# Patient Record
Sex: FEMALE | Race: WHITE | NOT HISPANIC OR LATINO | Employment: OTHER | URBAN - NONMETROPOLITAN AREA
[De-identification: names, ages, dates, MRNs, and addresses within clinical notes are randomized per-mention and may not be internally consistent; named-entity substitution may affect disease eponyms.]

---

## 2017-04-19 ENCOUNTER — OFFICE VISIT (OUTPATIENT)
Dept: CHIROPRACTIC MEDICINE | Facility: OTHER | Age: 62
End: 2017-04-19
Attending: CHIROPRACTOR
Payer: COMMERCIAL

## 2017-04-19 DIAGNOSIS — M99.02 SEGMENTAL AND SOMATIC DYSFUNCTION OF THORACIC REGION: ICD-10-CM

## 2017-04-19 DIAGNOSIS — M54.50 ACUTE BILATERAL LOW BACK PAIN WITHOUT SCIATICA: ICD-10-CM

## 2017-04-19 DIAGNOSIS — M99.03 SEGMENTAL AND SOMATIC DYSFUNCTION OF LUMBAR REGION: Primary | ICD-10-CM

## 2017-04-19 PROCEDURE — 98940 CHIROPRACT MANJ 1-2 REGIONS: CPT | Performed by: CHIROPRACTOR

## 2017-04-19 NOTE — MR AVS SNAPSHOT
"              After Visit Summary   4/19/2017    Katelyn Manley    MRN: [id number]           Patient Information     Date Of Birth          1955        Visit Information        Provider Department      4/19/2017 10:30 AM [facility]        Today's Diagnoses     Segmental and somatic dysfunction of lumbar region    -  1    Acute bilateral low back pain without sciatica        Segmental and somatic dysfunction of thoracic region           Follow-ups after your visit        Your next 10 appointments already scheduled     Sep 26, 2017 10:00 AM CDT   (Arrive by 9:45 AM)   PHYSICAL with Rosio Matamoros MD   [facility] ([facility] [place] [facility])    [address]  [place] [address]   [phone]              Who to contact     If you have questions or need follow up information about today's clinic visit or your schedule please contact  [facility] MICHELA TATUM directly at [phone].  Normal or non-critical lab and imaging results will be communicated to you by MyChart, letter or phone within 4 business days after the clinic has received the results. If you do not hear from us within 7 days, please contact the clinic through MyChart or phone. If you have a critical or abnormal lab result, we will notify you by phone as soon as possible.  Submit refill requests through [employer] or call your pharmacy and they will forward the refill request to us. Please allow 3 business days for your refill to be completed.          Additional Information About Your Visit        MyChart Information     [employer] lets you send messages to your doctor, view your test results, renew your prescriptions, schedule appointments and more. To sign up, go to [email or web address] . Click on \"Log in\" on the left side of the screen, which will take you to the Welcome page. Then click on \"Sign up Now\" on the right side of the page.     You will be asked to enter the access code listed below, as well as " some personal information. Please follow the directions to create your username and password.     Your access code is: [email or web address]  Expires: 2017 11:44 AM     Your access code will  in 90 days. If you need help or a new code, please call your [place] clinic or [phone].        Care EveryWhere ID     This is your Care EveryWhere ID. This could be used by other organizations to access your [place] medical records  [id number]         Blood Pressure from Last 3 Encounters:   16 126/74   07/15/16 118/64   16 126/90    Weight from Last 3 Encounters:   16 162 lb (73.5 kg)   07/15/16 160 lb (72.6 kg)   16 168 lb (76.2 kg)              We Performed the Following     CHIROPRAC MANIP,SPINAL,1-2 REGIONS        Primary Care Provider Office Phone # Fax #    Robert Blakely [phone]       [facility] [address] [facility] [address]        Thank you!     [facility]. Our goal is always to provide you with excellent care. Hearing back from our patients is one way we can continue to improve our services. Please take a few minutes to complete the written survey that you may receive in the mail after your visit with us. Thank you!             Your Updated Medication List - Protect others around you: Learn how to safely use, store and throw away your medicines at www.disposemymeds.org.          This list is accurate as of: 17 11:44 AM.  Always use your most recent med list.                   Brand Name Dispense Instructions for use    COLACE PO      Take 100 mg by mouth daily       GLUCOSAMINE SULFATE PO      Take 2,000 mg by mouth daily       METAMUCIL PO          MULTIVITAMIN PO      Take 1 capsule by mouth daily.       nystatin-triamcinolone cream    MYCOLOG II     Apply topically daily

## 2017-04-19 NOTE — PROGRESS NOTES
Subjective Finding:    Chief compalint: Patient presents with:  Back Pain: left leg and foot pain  , Pain Scale: 5/10, Intensity: sharp, Duration: 1 weeks, Change since last visit: , Radiating: no.    Date of injury:     Activities that the pain restricts:   Home/household activities: no.  Work duties: no.  Hobbies/social: no.  Sleep: no.  Makes symptoms better: rest.  Makes symptoms worse: activity, cervical extension and cervical flexion.  Have you seen anyone else for the symptoms? No.  Work related: no.  Automobile related injury: no.    Objective and Assessment:    Posture Analysis:   High shoulder: right.  Head tilt: .  High iliac crest: .  Head carriage: forward.  Thoracic Kyphosis: neutral.  Lumbar Lordosis: neutral.    Lumbar Range of Motion: flexion decreased.  Cervical Range of Motion: flexion decreased.  Thoracic Range of Motion: .  Extremity Range of Motion: .    Palpation:   Lumbar paraspine tightness with restricted ROM      Segmental dysfunction pre-treatment: T5  L4-5  Right SI.      Assessment post-treatment:  Cervical: ROM increased and pain and tenderness decreased.  Thoracic: ROM increased.  Lumbar: ROM increased.    Comments: .      Complicating Factors: .    Plan / Procedure:    Expected release date: .  Treatment plan: PRN.  Instructed patient: ice 20 minutes every other hour as needed.  Short term goals: reduce pain.  Long term goals: restore normal function.  Prognosis: excellent.

## 2017-05-09 ENCOUNTER — OFFICE VISIT (OUTPATIENT)
Dept: CHIROPRACTIC MEDICINE | Facility: OTHER | Age: 62
End: 2017-05-09
Attending: CHIROPRACTOR
Payer: COMMERCIAL

## 2017-05-09 ENCOUNTER — TRANSFERRED RECORDS (OUTPATIENT)
Dept: HEALTH INFORMATION MANAGEMENT | Facility: HOSPITAL | Age: 62
End: 2017-05-09

## 2017-05-09 DIAGNOSIS — M99.02 SEGMENTAL AND SOMATIC DYSFUNCTION OF THORACIC REGION: ICD-10-CM

## 2017-05-09 DIAGNOSIS — M54.50 ACUTE BILATERAL LOW BACK PAIN WITHOUT SCIATICA: ICD-10-CM

## 2017-05-09 DIAGNOSIS — M99.01 SEGMENTAL AND SOMATIC DYSFUNCTION OF CERVICAL REGION: ICD-10-CM

## 2017-05-09 DIAGNOSIS — M99.03 SEGMENTAL AND SOMATIC DYSFUNCTION OF LUMBAR REGION: Primary | ICD-10-CM

## 2017-05-09 PROCEDURE — 98941 CHIROPRACT MANJ 3-4 REGIONS: CPT | Performed by: CHIROPRACTOR

## 2017-05-09 NOTE — MR AVS SNAPSHOT
"              After Visit Summary   5/9/2017    Katelyn Manley    MRN: [id number]           Patient Information     Date Of Birth          1955        Visit Information        Provider Department      5/9/2017 10:40 AM [facility]        Today's Diagnoses     Segmental and somatic dysfunction of lumbar region    -  1    Acute bilateral low back pain without sciatica        Segmental and somatic dysfunction of thoracic region        Segmental and somatic dysfunction of cervical region           Follow-ups after your visit        Your next 10 appointments already scheduled     Sep 26, 2017 10:00 AM CDT   (Arrive by 9:45 AM)   PHYSICAL with Rosio Matamoros MD   [facility] ([facility])    [address]  [facility] [address]   [phone]              Who to contact     If you have questions or need follow up information about today's clinic visit or your schedule please contact  [facility] MICHELA TATUM directly at [phone].  Normal or non-critical lab and imaging results will be communicated to you by MyChart, letter or phone within 4 business days after the clinic has received the results. If you do not hear from us within 7 days, please contact the clinic through MyChart or phone. If you have a critical or abnormal lab result, we will notify you by phone as soon as possible.  Submit refill requests through [employer] or call your pharmacy and they will forward the refill request to us. Please allow 3 business days for your refill to be completed.          Additional Information About Your Visit        [employer] Information     [employer] lets you send messages to your doctor, view your test results, renew your prescriptions, schedule appointments and more. To sign up, go to [email or web address] . Click on \"Log in\" on the left side of the screen, which will take you to the Welcome page. Then click on \"Sign up Now\" on the right side of the page.     You will be " asked to enter the access code listed below, as well as some personal information. Please follow the directions to create your username and password.     Your access code is: [email or web address]  Expires: 2017 11:44 AM     Your access code will  in 90 days. If you need help or a new code, please call your [place] clinic or [phone].        Care EveryWhere ID     This is your Care EveryWhere ID. This could be used by other organizations to access your [place] medical records  [id number]         Blood Pressure from Last 3 Encounters:   16 126/74   07/15/16 118/64   16 126/90    Weight from Last 3 Encounters:   16 162 lb (73.5 kg)   07/15/16 160 lb (72.6 kg)   16 168 lb (76.2 kg)              We Performed the Following     CHIROPRAC MANIP,SPINAL,3-4 REGIONS        Primary Care Provider Office Phone # Fax #    Robert Blakely [phone]       [facility] [address] [facility] [address]        Thank you!     Thank you for choosing  [facility]  for your care. Our goal is always to provide you with excellent care. Hearing back from our patients is one way we can continue to improve our services. Please take a few minutes to complete the written survey that you may receive in the mail after your visit with us. Thank you!             Your Updated Medication List - Protect others around you: Learn how to safely use, store and throw away your medicines at www.disposemymeds.org.          This list is accurate as of: 17 11:59 PM.  Always use your most recent med list.                   Brand Name Dispense Instructions for use    COLACE PO      Take 100 mg by mouth daily       GLUCOSAMINE SULFATE PO      Take 2,000 mg by mouth daily       METAMUCIL PO          MULTIVITAMIN PO      Take 1 capsule by mouth daily.       nystatin-triamcinolone cream    MYCOLOG II     Apply topically daily

## 2017-07-20 ENCOUNTER — OFFICE VISIT (OUTPATIENT)
Dept: CHIROPRACTIC MEDICINE | Facility: OTHER | Age: 62
End: 2017-07-20
Attending: CHIROPRACTOR
Payer: COMMERCIAL

## 2017-07-20 DIAGNOSIS — M54.2 CERVICALGIA: ICD-10-CM

## 2017-07-20 DIAGNOSIS — M99.02 SEGMENTAL AND SOMATIC DYSFUNCTION OF THORACIC REGION: ICD-10-CM

## 2017-07-20 DIAGNOSIS — M99.01 SEGMENTAL AND SOMATIC DYSFUNCTION OF CERVICAL REGION: Primary | ICD-10-CM

## 2017-07-20 PROCEDURE — 98940 CHIROPRACT MANJ 1-2 REGIONS: CPT | Performed by: CHIROPRACTOR

## 2017-07-20 NOTE — MR AVS SNAPSHOT
"              After Visit Summary   7/20/2017    Katelyn Manley    MRN: [id number]           Patient Information     Date Of Birth          1955        Visit Information        Provider Department      7/20/2017 10:00 AM [facility]        Today's Diagnoses     Segmental and somatic dysfunction of cervical region    -  1    Cervicalgia        Segmental and somatic dysfunction of thoracic region           Follow-ups after your visit        Your next 10 appointments already scheduled     Sep 26, 2017 10:00 AM CDT   (Arrive by 9:45 AM)   PHYSICAL with Rosio Matamoros MD   [facility] [place] ([facility] )    [address]  [place] [address]   [phone]              Who to contact     If you have questions or need follow up information about today's clinic visit or your schedule please contact  [facility] SHANNA TATUM directly at [phone].  Normal or non-critical lab and imaging results will be communicated to you by MyChart, letter or phone within 4 business days after the clinic has received the results. If you do not hear from us within 7 days, please contact the clinic through MyChart or phone. If you have a critical or abnormal lab result, we will notify you by phone as soon as possible.  Submit refill requests through [employer] or call your pharmacy and they will forward the refill request to us. Please allow 3 business days for your refill to be completed.          Additional Information About Your Visit        MyChart Information     [employer] lets you send messages to your doctor, view your test results, renew your prescriptions, schedule appointments and more. To sign up, go to [email or web address] . Click on \"Log in\" on the left side of the screen, which will take you to the Welcome page. Then click on \"Sign up Now\" on the right side of the page.     You will be asked to enter the access code listed below, as well as some personal " information. Please follow the directions to create your username and password.     Your access code is: [email or web address]  Expires: 10/22/2017 12:57 PM     Your access code will  in 90 days. If you need help or a new code, please call your [place] clinic or [phone].        Care EveryWhere ID     This is your Care EveryWhere ID. This could be used by other organizations to access your [place] medical records  [id number]         Blood Pressure from Last 3 Encounters:   16 126/74   07/15/16 118/64   16 126/90    Weight from Last 3 Encounters:   16 162 lb (73.5 kg)   07/15/16 160 lb (72.6 kg)   16 168 lb (76.2 kg)              We Performed the Following     CHIROPRAC MANIP,SPINAL,1-2 REGIONS        Primary Care Provider Office Phone # Fax #    Robert Blakely [phone]       [facility] [address] [address]        Equal Access to Services     [facility]: Hadii aad ku hadasho Soomaali, waaxda luqadaha, qaybta kaalmada adeegyada, sascha lindsay . So [facility] [phone].    ATENCIÓN: Si habla español, tiene a bennett disposición servicios gratuitos de asistencia lingüística. Llame al [phone].    We comply with applicable federal civil rights laws and [place] laws. We do not discriminate on the basis of race, color, national origin, age, disability sex, sexual orientation or gender identity.            Thank you!     Thank you for choosing  [facility]  for your care. Our goal is always to provide you with excellent care. Hearing back from our patients is one way we can continue to improve our services. Please take a few minutes to complete the written survey that you may receive in the mail after your visit with us. Thank you!             Your Updated Medication List - Protect others around you: Learn how to safely use, store and throw away your medicines at www.disposemymeds.org.          This list is  accurate as of: 7/20/17 11:59 PM.  Always use your most recent med list.                   Brand Name Dispense Instructions for use Diagnosis    COLACE PO      Take 100 mg by mouth daily        GLUCOSAMINE SULFATE PO      Take 2,000 mg by mouth daily        METAMUCIL PO           MULTIVITAMIN PO      Take 1 capsule by mouth daily.        nystatin-triamcinolone cream    MYCOLOG II     Apply topically daily

## 2017-07-24 NOTE — PROGRESS NOTES
Subjective Finding:    Chief compalint: Patient presents with:  Neck Pain  , Pain Scale: 5/10, Intensity: sharp, Duration: 1 weeks, Change since last visit: , Radiating: no.    Date of injury:     Activities that the pain restricts:   Home/household activities: no.  Work duties: no.  Hobbies/social: no.  Sleep: no.  Makes symptoms better: rest.  Makes symptoms worse: activity, cervical extension and cervical flexion.  Have you seen anyone else for the symptoms? No.  Work related: no.  Automobile related injury: no.    Objective and Assessment:    Posture Analysis:   High shoulder: right.  Head tilt: .  High iliac crest: .  Head carriage: forward.  Thoracic Kyphosis: neutral.  Lumbar Lordosis: neutral.    Lumbar Range of Motion: flexion decreased.  Cervical Range of Motion: flexion decreased.  Thoracic Range of Motion: .  Extremity Range of Motion: .    Palpation:   C paraspine tightness with restricted ROM      Segmental dysfunction pre-treatment: T5   C7      Assessment post-treatment:  Cervical: ROM increased and pain and tenderness decreased.  Thoracic: ROM increased.  Lumbar: ROM increased.    Comments: .      Complicating Factors: .    Plan / Procedure:    Expected release date: .  Treatment plan: PRN.  Instructed patient: ice 20 minutes every other hour as needed.  Short term goals: reduce pain.  Long term goals: restore normal function.  Prognosis: excellent.

## 2017-08-04 DIAGNOSIS — Z12.31 VISIT FOR SCREENING MAMMOGRAM: Primary | ICD-10-CM

## 2017-09-12 DIAGNOSIS — Z12.31 VISIT FOR SCREENING MAMMOGRAM: Primary | ICD-10-CM

## 2017-09-12 PROCEDURE — G0202 SCR MAMMO BI INCL CAD: HCPCS | Mod: TC | Performed by: RADIOLOGY

## 2017-09-12 PROCEDURE — 77063 BREAST TOMOSYNTHESIS BI: CPT | Mod: TC | Performed by: RADIOLOGY

## 2017-09-18 ENCOUNTER — OFFICE VISIT (OUTPATIENT)
Dept: FAMILY MEDICINE | Facility: OTHER | Age: 62
End: 2017-09-18
Attending: NURSE PRACTITIONER
Payer: COMMERCIAL

## 2017-09-18 VITALS
OXYGEN SATURATION: 98 % | SYSTOLIC BLOOD PRESSURE: 120 MMHG | BODY MASS INDEX: 28.35 KG/M2 | TEMPERATURE: 97.6 F | WEIGHT: 160 LBS | HEIGHT: 63 IN | DIASTOLIC BLOOD PRESSURE: 80 MMHG | HEART RATE: 76 BPM | RESPIRATION RATE: 16 BRPM

## 2017-09-18 DIAGNOSIS — R30.0 DYSURIA: Primary | ICD-10-CM

## 2017-09-18 LAB
ALBUMIN UR-MCNC: NEGATIVE MG/DL
APPEARANCE UR: ABNORMAL
BACTERIA #/AREA URNS HPF: ABNORMAL /HPF
BILIRUB UR QL STRIP: NEGATIVE
COLOR UR AUTO: ABNORMAL
GLUCOSE UR STRIP-MCNC: NEGATIVE MG/DL
HGB UR QL STRIP: ABNORMAL
KETONES UR STRIP-MCNC: NEGATIVE MG/DL
LEUKOCYTE ESTERASE UR QL STRIP: ABNORMAL
MUCOUS THREADS #/AREA URNS LPF: PRESENT /LPF
NITRATE UR QL: NEGATIVE
PH UR STRIP: 5 PH (ref 4.7–8)
RBC #/AREA URNS AUTO: 11 /HPF (ref 0–2)
SOURCE: ABNORMAL
SP GR UR STRIP: 1.01 (ref 1–1.03)
UROBILINOGEN UR STRIP-MCNC: NORMAL MG/DL (ref 0–2)
WBC #/AREA URNS AUTO: >182 /HPF (ref 0–2)
WBC CLUMPS #/AREA URNS HPF: PRESENT /HPF

## 2017-09-18 PROCEDURE — 87186 SC STD MICRODIL/AGAR DIL: CPT | Performed by: NURSE PRACTITIONER

## 2017-09-18 PROCEDURE — 81001 URINALYSIS AUTO W/SCOPE: CPT | Performed by: NURSE PRACTITIONER

## 2017-09-18 PROCEDURE — 87086 URINE CULTURE/COLONY COUNT: CPT | Performed by: NURSE PRACTITIONER

## 2017-09-18 PROCEDURE — 87088 URINE BACTERIA CULTURE: CPT | Performed by: NURSE PRACTITIONER

## 2017-09-18 PROCEDURE — 99213 OFFICE O/P EST LOW 20 MIN: CPT | Performed by: NURSE PRACTITIONER

## 2017-09-18 RX ORDER — CIPROFLOXACIN 500 MG/1
500 TABLET, FILM COATED ORAL 2 TIMES DAILY
Qty: 14 TABLET | Refills: 0 | Status: SHIPPED | OUTPATIENT
Start: 2017-09-18 | End: 2017-09-26

## 2017-09-18 ASSESSMENT — ANXIETY QUESTIONNAIRES
4. TROUBLE RELAXING: NOT AT ALL
6. BECOMING EASILY ANNOYED OR IRRITABLE: NOT AT ALL
5. BEING SO RESTLESS THAT IT IS HARD TO SIT STILL: NOT AT ALL
3. WORRYING TOO MUCH ABOUT DIFFERENT THINGS: NOT AT ALL
IF YOU CHECKED OFF ANY PROBLEMS ON THIS QUESTIONNAIRE, HOW DIFFICULT HAVE THESE PROBLEMS MADE IT FOR YOU TO DO YOUR WORK, TAKE CARE OF THINGS AT HOME, OR GET ALONG WITH OTHER PEOPLE: NOT DIFFICULT AT ALL
1. FEELING NERVOUS, ANXIOUS, OR ON EDGE: NOT AT ALL
2. NOT BEING ABLE TO STOP OR CONTROL WORRYING: NOT AT ALL
7. FEELING AFRAID AS IF SOMETHING AWFUL MIGHT HAPPEN: NOT AT ALL
GAD7 TOTAL SCORE: 0

## 2017-09-18 ASSESSMENT — PAIN SCALES - GENERAL: PAINLEVEL: NO PAIN (1)

## 2017-09-18 ASSESSMENT — PATIENT HEALTH QUESTIONNAIRE - PHQ9: SUM OF ALL RESPONSES TO PHQ QUESTIONS 1-9: 0

## 2017-09-18 NOTE — MR AVS SNAPSHOT
"              After Visit Summary   9/18/2017    Katelyn Manley    MRN: [id number]           Patient Information     Date Of Birth          1955        Visit Information        Provider Department      9/18/2017 8:20 AM Katelyn Sarabia APRN [facility]        Today's Diagnoses     Dysuria    -  1      Care Instructions      Dysuria     Painful urination (dysuria) is often caused by a problem in the urinary tract.   Dysuria is pain felt during urination. It is often described as a burning. Learn more about this problem and how it can be treated.  What causes dysuria?  Possible causes include:    Infection with a bacteria or virus such as a urinary tract infection (UTI or a sexually transmitted infection (STI)    Sensitivity or allergy to chemicals such as those found in lotions and other products    Prostate or bladder problems    Radiation therapy to the pelvic area  How is dysuria diagnosed?  Your healthcare provider will examine you. He or she will ask about your symptoms and health. After talking with you and doing a physical exam, your healthcare provider may know what is causing your dysuria. He or she will usually request  a sample of your urine. Tests of your urine, or a \"urinalysis,\" are done. A urinalysis may include:    Looking at the urine sample (visual exam)    Checking for substances (chemical exam)    Looking at a small amount under a microscope (microscopic exam)  Some parts of the urinalysis may be done in the provider's office and some in a lab. And, the urine sample may be checked for bacteria and yeast (urine culture). Your healthcare provider will tell you more about these tests if they are needed.  How is dysuria treated?  Treatment depends on the cause. If you have a bacterial infection, you may need antibiotics. You may be given medicines to make it easier for you to urinate and help relieve pain. Your healthcare provider can tell you more about your treatment " options. Untreated, symptoms may get worse.  When to call your healthcare provider  Call the healthcare provider right away if you have any of the following:    Fever of 100.4 F (38 C) or higher     No improvement after three days of treatment    Trouble urinating because of pain    New or increased discharge from the vagina or penis    Rash or joint pain    Increased back or abdominal pain    Enlarged painful lymph nodes (lumps) in the groin   Date Last Reviewed: 1/1/2017 2000-2017 [employer]. [address] [place] [address]. All rights reserved. This information is not intended as a substitute for professional medical care. Always follow your healthcare professional's instructions.                Follow-ups after your visit        Follow-up notes from your care team     Return if symptoms worsen or fail to improve.      Your next 10 appointments already scheduled     Sep 26, 2017 10:00 AM CDT   (Arrive by 9:45 AM)   PHYSICAL with Rosio Matamoros MD   [facility] ([facility] )    [address] [place] [address]   [phone]              Who to contact     If you have questions or need follow up information about today's clinic visit or your schedule please contact [facility] directly at [phone].  Normal or non-critical lab and imaging results will be communicated to you by MyChart, letter or phone within 4 business days after the clinic has received the results. If you do not hear from us within 7 days, please contact the clinic through MyChart or phone. If you have a critical or abnormal lab result, we will notify you by phone as soon as possible.  Submit refill requests through [employer] or call your pharmacy and they will forward the refill request to us. Please allow 3 business days for your refill to be completed.          Additional Information About Your Visit        [employer] Information     [employer] lets you send messages  "to your doctor, view your test results, renew your prescriptions, schedule appointments and more. To sign up, go to [email or web address] . Click on \"Log in\" on the left side of the screen, which will take you to the Welcome page. Then click on \"Sign up Now\" on the right side of the page.     You will be asked to enter the access code listed below, as well as some personal information. Please follow the directions to create your username and password.     Your access code is: [email or web address]  Expires: 10/22/2017 12:57 PM     Your access code will  in 90 days. If you need help or a new code, please call your [place] clinic or [phone].        Care EveryWhere ID     This is your Care EveryWhere ID. This could be used by other organizations to access your [place] medical records  [id number]        Your Vitals Were     Pulse Temperature Respirations Height Pulse Oximetry BMI (Body Mass Index)    76 97.6  F (36.4  C) (Tympanic) 16 5' 3.25\" (1.607 m) 98% 28.12 kg/m2       Blood Pressure from Last 3 Encounters:   17 120/80   16 126/74   07/15/16 118/64    Weight from Last 3 Encounters:   17 160 lb (72.6 kg)   16 162 lb (73.5 kg)   07/15/16 160 lb (72.6 kg)              We Performed the Following     UA with Microscopic reflex to Culture - MICHELA          Today's Medication Changes          These changes are accurate as of: 17  9:15 AM.  If you have any questions, ask your nurse or doctor.               Start taking these medicines.        Dose/Directions    ciprofloxacin 500 MG tablet   Commonly known as:  CIPRO   Used for:  Dysuria   Started by:  Katelyn Sarabia APRN CNP        Dose:  500 mg   Take 1 tablet (500 mg) by mouth 2 times daily   Quantity:  14 tablet   Refills:  0            Where to get your medicines      These medications were sent to [facility] - [address]  [address] [place] [address]     Phone:  [phone]     " ciprofloxacin 500 MG tablet                Primary Care Provider Office Phone # Fax #    Robert Blakely [phone]       [facility] [address] [facility] [facility] [address]        Equal Access to Services     [employer] : Hadzac medina hadcorio Soboubacarali, waaxda luqadaha, qaybta kaalmada adeegyada, sascha greggn batool fierro angélica bond. So [facility] [phone].    ATENCIÓN: Si habla español, tiene a bennett disposición servicios gratuitos de asistencia lingüística. Llame al [phone].    We comply with applicable federal civil rights laws and [place] laws. We do not discriminate on the basis of race, color, national origin, age, disability sex, sexual orientation or gender identity.            Thank you!     Thank you for choosing [facility]  for your care. Our goal is always to provide you with excellent care. Hearing back from our patients is one way we can continue to improve our services. Please take a few minutes to complete the written survey that you may receive in the mail after your visit with us. Thank you!             Your Updated Medication List - Protect others around you: Learn how to safely use, store and throw away your medicines at www.disposemymeds.org.          This list is accurate as of: 9/18/17  9:15 AM.  Always use your most recent med list.                   Brand Name Dispense Instructions for use Diagnosis    ciprofloxacin 500 MG tablet    CIPRO    14 tablet    Take 1 tablet (500 mg) by mouth 2 times daily    Dysuria       COLACE PO      Take 100 mg by mouth daily        GLUCOSAMINE SULFATE PO      Take 2,000 mg by mouth daily        METAMUCIL PO           MULTIVITAMIN PO      Take 1 capsule by mouth daily.        nystatin-triamcinolone cream    MYCOLOG II     Apply topically daily

## 2017-09-18 NOTE — PROGRESS NOTES
SUBJECTIVE:   Katelyn Manley is a 62 year old female who presents to clinic today for the following health issues:      URINARY TRACT SYMPTOMS      Duration: one week    Description  frequency    Intensity:  Mild    Accompanying signs and symptoms:  Fever/chills: no   Flank pain no   Nausea and vomiting: no   Vaginal symptoms: none and itching  Abdominal/Pelvic Pain: no     History  History of frequent UTI's: YES- over one year ago  History of kidney stones: no   Sexually Active: YES  Possibility of pregnancy: No    Precipitating or alleviating factors: None    Therapies tried and outcome: none   Outcome: none            Problem list and histories reviewed & adjusted, as indicated.  Additional history: as documented    Patient Active Problem List   Diagnosis     Pre-diabetes     Leukoplakia     Lichen sclerosus     Dyslipidemia     Status post vaginal hysterectomy     Past Surgical History:   Procedure Laterality Date     COLONOSCOPY  2004     COLONOSCOPY  2-    Dr Sanchez/ sigmoid diverticular dz     COLPORRHAPHY POSTERIOR N/A 2/18/2015    Procedure: COLPORRHAPHY POSTERIOR;  Surgeon: Vasquez Sauer MD;  [facility]     CYSTOSCOPY N/A 2/18/2015    Procedure: CYSTOSCOPY;  Surgeon: Vasquez Sauer MD;  [facility]     DILATION AND CURETTAGE, HYSTEROSCOPY DIAGNOSTIC, COMBINED  5/7/2014    Procedure: COMBINED DILATION AND CURETTAGE, HYSTEROSCOPY DIAGNOSTIC;  Surgeon: Vasquez Sauer MD;  [facility]     EYE SURGERY Left 08/2016    macular hole repair     HYSTERECTOMY VAGINAL, BILATERAL SALPINGO-OOPHERECTOMY, COMBINED N/A 2/18/2015    Procedure: COMBINED HYSTERECTOMY VAGINAL, SALPINGO-OOPHORECTOMY;  Surgeon: Vasquez Sauer MD;  [facility]     JOINT REPLACEMENT  unijoint    right     JOINT REPLACEMENT  full joint    x2 - left     SLING TRANSOBTURATOR N/A 2/18/2015    Procedure: SLING TRANSOBTURATOR;  Surgeon: Vasquez Sauer MD;  [facility]       Social History   Substance Use Topics     Smoking  "status: Never Smoker     Smokeless tobacco: Never Used     Alcohol use No     Family History   Problem Relation Age of Onset     CEREBROVASCULAR DISEASE Father 77     CVA - cause of death     Other - See Comments Mother      [place] Granulometosis - cause of death     Other - See Comments Brother      multiple sclerosis         Current Outpatient Prescriptions   Medication Sig Dispense Refill     nystatin-triamcinolone (MYCOLOG II) cream Apply topically daily       GLUCOSAMINE SULFATE PO Take 2,000 mg by mouth daily       Docusate Sodium (COLACE PO) Take 100 mg by mouth daily       Psyllium (METAMUCIL PO)        Multiple Vitamins-Minerals (MULTIVITAMIN OR) Take 1 capsule by mouth daily.       Allergies   Allergen Reactions     Cats      Cat/feline product derivatives     Dogs      Horse-Derived Products      And cows         Reviewed and updated as needed this visit by clinical staffTobacco  Allergies  Meds  Med Hx  Surg Hx  Fam Hx  Soc Hx      Reviewed and updated as needed this visit by Provider         ROS:  C: NEGATIVE for fever, chills, change in weight  R: NEGATIVE for significant cough or SOB  CV: NEGATIVE for chest pain, palpitations or peripheral edema  GI: NEGATIVE for nausea, abdominal pain, heartburn, or change in bowel habits  : dysuria, frequency  and urgency    OBJECTIVE:     /80 (BP Location: Right arm, Patient Position: Sitting, Cuff Size: Adult Regular)  Pulse 76  Temp 97.6  F (36.4  C) (Tympanic)  Resp 16  Ht 5' 3.25\" (1.607 m)  Wt 160 lb (72.6 kg)  SpO2 98%  BMI 28.12 kg/m2  Body mass index is 28.12 kg/(m^2).   GENERAL: healthy, alert and no distress  RESP: lungs clear to auscultation - no rales, rhonchi or wheezes  CV: regular rate and rhythm, normal S1 S2, no S3 or S4, no murmur, click or rub, no peripheral edema and peripheral pulses strong  ABDOMEN: soft, nontender, no hepatosplenomegaly, no masses and bowel sounds normal  PSYCH: mentation appears normal, affect " normal/bright    Diagnostic Test Results:  Results for orders placed or performed in visit on 09/18/17 (from the past 24 hour(s))   UA with Microscopic reflex to Culture - [place]   Result Value Ref Range    Color Urine Light Yellow     Appearance Urine Slightly Cloudy     Glucose Urine Negative NEG^Negative mg/dL    Bilirubin Urine Negative NEG^Negative    Ketones Urine Negative NEG^Negative mg/dL    Specific Gravity Urine 1.006 1.003 - 1.035    Blood Urine Moderate (A) NEG^Negative    pH Urine 5.0 4.7 - 8.0 pH    Protein Albumin Urine Negative NEG^Negative mg/dL    Urobilinogen mg/dL Normal 0.0 - 2.0 mg/dL    Nitrite Urine Negative NEG^Negative    Leukocyte Esterase Urine Large (A) NEG^Negative    Source Midstream Urine     WBC Urine >182 (H) 0 - 2 /HPF    RBC Urine 11 (H) 0 - 2 /HPF    WBC Clumps Present (A) NEG^Negative /HPF    Bacteria Urine Moderate (A) NEG^Negative /HPF    Mucous Urine Present (A) NEG^Negative /LPF       ASSESSMENT:       PLAN:   ASSESSMENT / PLAN:  (R30.0) Dysuria  (primary encounter diagnosis)  Comment: treating for cystitis with cipro. UC ordered. Encouraged hydration. Katelyn should follow up if no improvement or worsening symptoms  Plan:  UA with Microscopic reflex to Culture - [place],    Urine Culture Aerobic Bacterial,   ciprofloxacin (CIPRO) 500 MG tablet       Keep appointment with OB/GYN          See Patient Instructions    [facility] [place]

## 2017-09-18 NOTE — NURSING NOTE
"Chief Complaint   Patient presents with     UTI       Initial /80 (BP Location: Right arm, Patient Position: Sitting, Cuff Size: Adult Regular)  Pulse 76  Temp 97.6  F (36.4  C) (Tympanic)  Resp 16  Ht 5' 3.25\" (1.607 m)  Wt 160 lb (72.6 kg)  SpO2 98%  BMI 28.12 kg/m2 Estimated body mass index is 28.12 kg/(m^2) as calculated from the following:    Height as of this encounter: 5' 3.25\" (1.607 m).    Weight as of this encounter: 160 lb (72.6 kg).  Medication Reconciliation: complete   Brittany Caceres      "

## 2017-09-18 NOTE — PATIENT INSTRUCTIONS

## 2017-09-19 ASSESSMENT — ANXIETY QUESTIONNAIRES: GAD7 TOTAL SCORE: 0

## 2017-09-20 DIAGNOSIS — N30.01 ACUTE CYSTITIS WITH HEMATURIA: Primary | ICD-10-CM

## 2017-09-20 LAB
BACTERIA SPEC CULT: ABNORMAL
SPECIMEN SOURCE: ABNORMAL

## 2017-09-20 RX ORDER — SULFAMETHOXAZOLE/TRIMETHOPRIM 800-160 MG
1 TABLET ORAL 2 TIMES DAILY
Qty: 14 TABLET | Refills: 0 | Status: SHIPPED | OUTPATIENT
Start: 2017-09-20 | End: 2017-10-06

## 2017-09-20 NOTE — PROGRESS NOTES
Results for orders placed or performed in visit on 09/18/17   UA with Microscopic reflex to Culture - [place]   Result Value Ref Range    Color Urine Light Yellow     Appearance Urine Slightly Cloudy     Glucose Urine Negative NEG^Negative mg/dL    Bilirubin Urine Negative NEG^Negative    Ketones Urine Negative NEG^Negative mg/dL    Specific Gravity Urine 1.006 1.003 - 1.035    Blood Urine Moderate (A) NEG^Negative    pH Urine 5.0 4.7 - 8.0 pH    Protein Albumin Urine Negative NEG^Negative mg/dL    Urobilinogen mg/dL Normal 0.0 - 2.0 mg/dL    Nitrite Urine Negative NEG^Negative    Leukocyte Esterase Urine Large (A) NEG^Negative    Source Midstream Urine     WBC Urine >182 (H) 0 - 2 /HPF    RBC Urine 11 (H) 0 - 2 /HPF    WBC Clumps Present (A) NEG^Negative /HPF    Bacteria Urine Moderate (A) NEG^Negative /HPF    Mucous Urine Present (A) NEG^Negative /LPF   Urine Culture Aerobic Bacterial   Result Value Ref Range    Specimen Description Midstream Urine     Culture Micro >100,000 colonies/mL  Escherichia coli   (A)        Susceptibility    Escherichia coli - JUDY     Amoxicillin/Clav 4 Sensitive ug/mL     AMPICILLIN 4 Sensitive ug/mL     CEFAZOLIN* <=4 Sensitive ug/mL      * Cefazolin JUDY breakpoints are for the treatment of uncomplicated urinary tract infections.  For the treatment of systemic infections, please contact the laboratory for additional testing.     CEFTAZIDIME <=1 Sensitive ug/mL     CEFTRIAXONE <=1 Sensitive ug/mL     CIPROFLOXACIN >=4 Resistant ug/mL     GENTAMICIN <=1 Sensitive ug/mL     LEVOFLOXACIN >=8 Resistant ug/mL     NITROFURANTOIN <=16 Sensitive ug/mL     TOBRAMYCIN <=1 Sensitive ug/mL     Trimethoprim/Sulfa <=1/19 Sensitive ug/mL     AMPICILLIN/SULBACTAM <=2 Sensitive ug/mL     Piperacillin/Tazo <=4 Sensitive ug/mL     CEFEPIME <=1 Sensitive ug/mL     Katelyn was initially placed on Cipro. Will have her stop the Cipro and start bactrim.  Faxed order to [facility]

## 2017-09-21 ENCOUNTER — TRANSFERRED RECORDS (OUTPATIENT)
Dept: HEALTH INFORMATION MANAGEMENT | Facility: HOSPITAL | Age: 62
End: 2017-09-21

## 2017-09-26 ENCOUNTER — OFFICE VISIT (OUTPATIENT)
Dept: OBGYN | Facility: OTHER | Age: 62
End: 2017-09-26
Attending: OBSTETRICS & GYNECOLOGY
Payer: COMMERCIAL

## 2017-09-26 VITALS
HEART RATE: 76 BPM | SYSTOLIC BLOOD PRESSURE: 112 MMHG | HEIGHT: 63 IN | WEIGHT: 156 LBS | DIASTOLIC BLOOD PRESSURE: 70 MMHG | BODY MASS INDEX: 27.64 KG/M2

## 2017-09-26 DIAGNOSIS — Z00.00 ROUTINE GENERAL MEDICAL EXAMINATION AT A HEALTH CARE FACILITY: Primary | ICD-10-CM

## 2017-09-26 LAB — HEMOCCULT SP1 STL QL: NEGATIVE

## 2017-09-26 PROCEDURE — 99396 PREV VISIT EST AGE 40-64: CPT | Performed by: OBSTETRICS & GYNECOLOGY

## 2017-09-26 PROCEDURE — 82274 ASSAY TEST FOR BLOOD FECAL: CPT | Performed by: OBSTETRICS & GYNECOLOGY

## 2017-09-26 ASSESSMENT — ANXIETY QUESTIONNAIRES
7. FEELING AFRAID AS IF SOMETHING AWFUL MIGHT HAPPEN: NOT AT ALL
2. NOT BEING ABLE TO STOP OR CONTROL WORRYING: NOT AT ALL
GAD7 TOTAL SCORE: 0
5. BEING SO RESTLESS THAT IT IS HARD TO SIT STILL: NOT AT ALL
6. BECOMING EASILY ANNOYED OR IRRITABLE: NOT AT ALL
1. FEELING NERVOUS, ANXIOUS, OR ON EDGE: NOT AT ALL
3. WORRYING TOO MUCH ABOUT DIFFERENT THINGS: NOT AT ALL

## 2017-09-26 ASSESSMENT — PATIENT HEALTH QUESTIONNAIRE - PHQ9
5. POOR APPETITE OR OVEREATING: NOT AT ALL
SUM OF ALL RESPONSES TO PHQ QUESTIONS 1-9: 0

## 2017-09-26 NOTE — NURSING NOTE
"Chief Complaint   Patient presents with     Gyn Exam       Initial /70  Pulse 76  Ht 5' 3.25\" (1.607 m)  Wt 156 lb (70.8 kg)  BMI 27.42 kg/m2 Estimated body mass index is 27.42 kg/(m^2) as calculated from the following:    Height as of this encounter: 5' 3.25\" (1.607 m).    Weight as of this encounter: 156 lb (70.8 kg).  Medication Reconciliation: complete   Ernestina Pedro      "

## 2017-09-26 NOTE — PATIENT INSTRUCTIONS
Fasting lab due on Friday.  iFob (screen for blood in stool) test and instructions given.  Return for annual exam.  Colonoscopy due in 2022.  Pneumonia vaccines due after age 65  Return for annual exam in 1 year

## 2017-09-26 NOTE — PROGRESS NOTES
ANNUAL    Katelyn is a 62 year old   who presents for annual exam.   Postmenopausal since 12 years.  She is having no symptoms. No vaginal bleeding noted.     Concerns other than routine annual and health maintenance:  Had uti.  GYNECOLOGIC HISTORY:    She is sexually active  Problems with sex: n  Safe: y   Wanting std testing? n  Estrogen replacement therapy:  n  History of abnormal Pap smear: cryo age 22  Family history of breast ca n, ovarian ca n, uterine n, colon CA:  n       HEALTH MAINTENANCE:  Cholesterol: (No components found for: CHOL2 ) History of abnormal lipids: elevated   Last Mammo: current . History of abnormal Mammo: n   Regular Self Breast Exams: y  Last Colonoscopy:  History of abnormal Colonoscopy n  Calcium or vitamins; y   Exercise: y     TSH: (No components found for: TSH1 )  Pap; (  Lab Results   Component Value Date    PAP NIL 2014    )    HISTORY:  Obstetric History       T0      L2     SAB0   TAB0   Ectopic0   Multiple0   Live Births2       # Outcome Date GA Lbr Reed/2nd Weight Sex Delivery Anes PTL Lv   3 SAB            2 Para     F Vag-Spont   TAMMY   1 Para     F Vag-Spont   TAMMY        Past Medical History:   Diagnosis Date     Cyst of Bartholin's gland 10/22/2007     Diverticulitis of sigmoid colon 2011     Endometrial hyperplasia without atypia, simple      Glucose intolerance (impaired glucose tolerance) 790.22     Hypercholesterolemia 2011     Menopause 2013     Osteoarthrosis, unspecified whether generalized or localized, lower leg 2002     S/P vaginal hysterectomy 2015     Special screening for malignant neoplasms, colon 10/15/2004     Status post total knee replacement 2011     Past Surgical History:   Procedure Laterality Date     COLONOSCOPY       COLONOSCOPY  2012    Dr Sanchez/ sigmoid diverticular dz     COLPORRHAPHY POSTERIOR N/A 2015    Procedure: COLPORRHAPHY POSTERIOR;  Surgeon: Vasquez Sauer MD;  [facility]     CYSTOSCOPY N/A 2/18/2015    Procedure: CYSTOSCOPY;  Surgeon: Vasquez Sauer MD;  [facility]     DILATION AND CURETTAGE, HYSTEROSCOPY DIAGNOSTIC, COMBINED  5/7/2014    Procedure: COMBINED DILATION AND CURETTAGE, HYSTEROSCOPY DIAGNOSTIC;  Surgeon: Vasquez Sauer MD;  [facility]     EYE SURGERY Left 08/2016    macular hole repair     HYSTERECTOMY VAGINAL, BILATERAL SALPINGO-OOPHERECTOMY, COMBINED N/A 2/18/2015    Procedure: COMBINED HYSTERECTOMY VAGINAL, SALPINGO-OOPHORECTOMY;  Surgeon: Vasquez Sauer MD;  [facility]     JOINT REPLACEMENT  unijoint    right     JOINT REPLACEMENT  full joint    x2 - left     SLING TRANSOBTURATOR N/A 2/18/2015    Procedure: SLING TRANSOBTURATOR;  Surgeon: Vasquez Sauer MD;  [facility]     Family History   Problem Relation Age of Onset     CEREBROVASCULAR DISEASE Father 77     CVA - cause of death     Other - See Comments Mother      [place] Granulometosis - cause of death     Other - See Comments Brother      multiple sclerosis     Social History     Social History     Marital status:      Spouse name: N/A     Number of children: N/A     Years of education: N/A     Social History Main Topics     Smoking status: Never Smoker     Smokeless tobacco: Never Used     Alcohol use No     Drug use: No     Sexual activity: Yes     Partners: Male     Birth control/ protection: Female Surgical, Post-menopausal     Other Topics Concern     Parent/Sibling W/ Cabg, Mi Or Angioplasty Before 65f 55m? Yes     dad MI age 50, survived     Social History Narrative       Current Outpatient Prescriptions:      sulfamethoxazole-trimethoprim (BACTRIM DS/SEPTRA DS) 800-160 MG per tablet, Take 1 tablet by mouth 2 times daily, Disp: 14 tablet, Rfl: 0     nystatin-triamcinolone (MYCOLOG II) cream, Apply topically daily, Disp: , Rfl:      GLUCOSAMINE SULFATE PO, Take 2,000 mg by mouth daily, Disp: , Rfl:      Docusate Sodium (COLACE PO), Take 100 mg by mouth daily, Disp: , Rfl:  "     Psyllium (METAMUCIL PO), , Disp: , Rfl:      Multiple Vitamins-Minerals (MULTIVITAMIN OR), Take 1 capsule by mouth daily., Disp: , Rfl:      Allergies   Allergen Reactions     Cats      Cat/feline product derivatives     Dogs      Horse-Derived Products      And cows       Past medical, surgical, social and family history were reviewed and updated in EPIC.     ROS:   C:       NEGATIVE for fever, chills, change in weight   I:         NEGATIVE for worrisome rashes, moles or lesions  E:       NEGATIVE for vision changes or irritation  E/M:   NEGATIVE for ear, mouth and throat problems  R:       NEGATIVE for significant cough or SOB  CV:     NEGATIVE for chest pain, palpitations or peripheral edema  GI:      NEGATIVE for nausea, abdominal pain, heartburn, or change in bowel habits  :    NEGATIVE for frequency, dysuria, hematuria, vaginal discharge, or bleeding, incontinence   M:       NEGATIVE for significant arthralgias or myalgia  N:       NEGATIVE for weakness, dizziness or paresthesias  E:       NEGATIVE for temperature intolerance, skin/hair changes  P:       NEGATIVE for changes in mood or affect     EXAM:  /70  Pulse 76  Ht 5' 3.25\" (1.607 m)  Wt 156 lb (70.8 kg)  BMI 27.42 kg/m2   BMI: Body mass index is 27.42 kg/(m^2).  Constitutional: healthy, alert and no distress  Head: Normocephalic. No masses, lesions, tenderness or abnormalities  Neck: Neck supple. Trachea midline. No adenopathy. Thyroid symmetric, normal size.   Cardiovascular: RRR.   Respiratory: lungs clear  Breast: Breasts reveal mild symmetric fibrocystic densities, but there are no dominant, discrete, fixed or suspicious masses found.   Gastrointestinal: Abdomen soft, non-tender, non-distended. No masses, organomegaly  Pelvic:  Vulva:  No external lesions, normal female hair distribution, no inguinal adenopathy.    Urethra:  Midline, non-tender, well supported, no discharge  Vagina:  Atrophic, no abnormal discharge, no " lesions  Uterus:  Normal size,  , non-tender, freely mobile  Cervix: clean  Ovaries:  No masses appreciated, non-tender, mobile  Rectal Exam: neg for heme or mass    Musculoskeletal: extremities normal  Skin: no suspicious lesions or rashes  Psychiatric: Affect appropriate, cooperative,mentation appears normal.     COUNSELING:     regular exercise   healthy diet/nutrition   Immunizations:   Folic Acid Counseling  Osteoporosis Prevention/Bone Health   reports that she has never smoked. She has never used smokeless tobacco.  Tobacco Cessation Action Plan:   Body mass index is 27.42 kg/(m^2).  Weight management plan: Current exercise routine: . Diet regimen was discussed and plan is na.     FRAX Risk Assessment  ASSESSMENT:  62 year old  with satisfactory annual exam    PLAN  ifob  Fasting chol with lipid profile,tsh,fbs,hga1c Friday (no hp)  Mammogram done,   Check MIIC  Colonoscopy   rto 1 yr      Greater than  0 minutes were spent on issues other than annual          Rosio Matamoros MD

## 2017-09-26 NOTE — MR AVS SNAPSHOT
After Visit Summary   9/26/2017    Katelyn Manley    MRN: [id number]           Patient Information     Date Of Birth          1955        Visit Information        Provider Department      9/26/2017 10:00 AM Rosio Matamoros MD [facility] [place]        Today's Diagnoses     Routine general medical examination at a health care facility    -  1      Care Instructions    Fasting lab due on Friday.  iFob (screen for blood in stool) test and instructions given.  Return for annual exam.  Colonoscopy due in 2022.  Pneumonia vaccines due after age 65  Return for annual exam in 1 year            Follow-ups after your visit        Your next 10 appointments already scheduled     Nov 08, 2017  9:00 AM CST   (Arrive by 8:45 AM)   Pre-Op physical with Robert Blakely MD   [facility] [place] [facility] )    [address] [place] [address]   [phone]              Future tests that were ordered for you today     Open Future Orders        Priority Expected Expires Ordered    Glucose Routine 9/29/2017 1/29/2018 9/26/2017    Hemoglobin A1c Routine 9/29/2017 1/29/2018 9/26/2017    Immunos occult blood Routine 9/29/2017 1/29/2018 9/26/2017    Lipid Profile Routine 9/29/2017 1/29/2018 9/26/2017    TSH Routine 9/29/2017 1/29/2018 9/26/2017            Who to contact     If you have questions or need follow up information about today's clinic visit or your schedule please contact [facility] directly at [phone].  Normal or non-critical lab and imaging results will be communicated to you by MyChart, letter or phone within 4 business days after the clinic has received the results. If you do not hear from us within 7 days, please contact the clinic through MyChart or phone. If you have a critical or abnormal lab result, we will notify you by phone as soon as possible.  Submit refill requests through [employer] or call your pharmacy and they will forward the refill  "request to us. Please allow 3 business days for your refill to be completed.          Additional Information About Your Visit        [employer] Information     [employer] lets you send messages to your doctor, view your test results, renew your prescriptions, schedule appointments and more. To sign up, go to [email or web address] . Click on \"Log in\" on the left side of the screen, which will take you to the Welcome page. Then click on \"Sign up Now\" on the right side of the page.     You will be asked to enter the access code listed below, as well as some personal information. Please follow the directions to create your username and password.     Your access code is: [email or web address]  Expires: 10/22/2017 12:57 PM     Your access code will  in 90 days. If you need help or a new code, please call your [place] clinic or [phone].        Care EveryWhere ID     This is your [facility] EveryWhere ID. This could be used by other organizations to access your [place] medical records  [id number]        Your Vitals Were     Pulse Height BMI (Body Mass Index)             76 5' 3.25\" (1.607 m) 27.42 kg/m2          Blood Pressure from Last 3 Encounters:   17 112/70   17 120/80   16 126/74    Weight from Last 3 Encounters:   17 156 lb (70.8 kg)   17 160 lb (72.6 kg)   16 162 lb (73.5 kg)              We Performed the Following     Immunos occult blood        Primary Care Provider Office Phone # Fax #    Robert Blakely [phone]       [facility] [address]  [place] [address]        Equal Access to Services     [facility]: Hadii lamont Best, wakyleda luqadaha, qaybta kaalmada mc, sascha bond. So [facility] [phone].    ATENCIÓN: Si habla español, tiene a bennett disposición servicios gratuitos de asistencia lingüística. Llame al [phone].    We comply with applicable federal civil rights laws and [place] laws. We " do not discriminate on the basis of race, color, national origin, age, disability sex, sexual orientation or gender identity.            Thank you!     [facility] you for choosing [facility]  for your care. Our goal is always to provide you with excellent care. Hearing back from our patients is one way we can continue to improve our services. Please take a few minutes to complete the written survey that you may receive in the mail after your visit with us. Thank you!             Your Updated Medication List - Protect others around you: Learn how to safely use, store and throw away your medicines at www.disposemymeds.org.          This list is accurate as of: 9/26/17 10:37 AM.  Always use your most recent med list.                   Brand Name Dispense Instructions for use Diagnosis    COLACE PO      Take 100 mg by mouth daily        GLUCOSAMINE SULFATE PO      Take 2,000 mg by mouth daily        METAMUCIL PO           MULTIVITAMIN PO      Take 1 capsule by mouth daily.        nystatin-triamcinolone cream    MYCOLOG II     Apply topically daily        sulfamethoxazole-trimethoprim 800-160 MG per tablet    BACTRIM DS/SEPTRA DS    14 tablet    Take 1 tablet by mouth 2 times daily    Acute cystitis with hematuria

## 2017-09-27 ASSESSMENT — ANXIETY QUESTIONNAIRES: GAD7 TOTAL SCORE: 0

## 2017-09-28 DIAGNOSIS — Z00.00 ROUTINE GENERAL MEDICAL EXAMINATION AT A HEALTH CARE FACILITY: ICD-10-CM

## 2017-09-28 PROCEDURE — 82274 ASSAY TEST FOR BLOOD FECAL: CPT | Performed by: OBSTETRICS & GYNECOLOGY

## 2017-09-29 DIAGNOSIS — Z00.00 ROUTINE GENERAL MEDICAL EXAMINATION AT A HEALTH CARE FACILITY: ICD-10-CM

## 2017-09-29 LAB
CHOLEST SERPL-MCNC: 206 MG/DL
EST. AVERAGE GLUCOSE BLD GHB EST-MCNC: 114 MG/DL
GLUCOSE SERPL-MCNC: 98 MG/DL (ref 70–99)
HBA1C MFR BLD: 5.6 % (ref 4.3–6)
HDLC SERPL-MCNC: 48 MG/DL
LDLC SERPL CALC-MCNC: 127 MG/DL
NONHDLC SERPL-MCNC: 158 MG/DL
TRIGL SERPL-MCNC: 156 MG/DL
TSH SERPL DL<=0.005 MIU/L-ACNC: 1.46 MU/L (ref 0.4–4)

## 2017-09-29 PROCEDURE — 84443 ASSAY THYROID STIM HORMONE: CPT | Performed by: OBSTETRICS & GYNECOLOGY

## 2017-09-29 PROCEDURE — 36415 COLL VENOUS BLD VENIPUNCTURE: CPT | Performed by: OBSTETRICS & GYNECOLOGY

## 2017-09-29 PROCEDURE — 82947 ASSAY GLUCOSE BLOOD QUANT: CPT | Performed by: OBSTETRICS & GYNECOLOGY

## 2017-09-29 PROCEDURE — 83036 HEMOGLOBIN GLYCOSYLATED A1C: CPT | Performed by: OBSTETRICS & GYNECOLOGY

## 2017-09-29 PROCEDURE — 80061 LIPID PANEL: CPT | Performed by: OBSTETRICS & GYNECOLOGY

## 2017-10-03 LAB — HEMOCCULT SP1 STL QL: NEGATIVE

## 2017-10-05 ENCOUNTER — TELEPHONE (OUTPATIENT)
Dept: FAMILY MEDICINE | Facility: OTHER | Age: 62
End: 2017-10-05

## 2017-10-06 ENCOUNTER — OFFICE VISIT (OUTPATIENT)
Dept: FAMILY MEDICINE | Facility: OTHER | Age: 62
End: 2017-10-06
Attending: FAMILY MEDICINE
Payer: COMMERCIAL

## 2017-10-06 VITALS
WEIGHT: 155 LBS | DIASTOLIC BLOOD PRESSURE: 70 MMHG | SYSTOLIC BLOOD PRESSURE: 112 MMHG | HEART RATE: 78 BPM | HEIGHT: 63 IN | BODY MASS INDEX: 27.46 KG/M2 | TEMPERATURE: 96.2 F

## 2017-10-06 DIAGNOSIS — M25.532 LEFT WRIST PAIN: ICD-10-CM

## 2017-10-06 DIAGNOSIS — Z23 NEED FOR PROPHYLACTIC VACCINATION AND INOCULATION AGAINST INFLUENZA: Primary | ICD-10-CM

## 2017-10-06 PROCEDURE — 99213 OFFICE O/P EST LOW 20 MIN: CPT | Mod: 25 | Performed by: FAMILY MEDICINE

## 2017-10-06 PROCEDURE — 90471 IMMUNIZATION ADMIN: CPT | Performed by: FAMILY MEDICINE

## 2017-10-06 PROCEDURE — 90686 IIV4 VACC NO PRSV 0.5 ML IM: CPT | Performed by: FAMILY MEDICINE

## 2017-10-06 ASSESSMENT — ANXIETY QUESTIONNAIRES
1. FEELING NERVOUS, ANXIOUS, OR ON EDGE: NOT AT ALL
5. BEING SO RESTLESS THAT IT IS HARD TO SIT STILL: NOT AT ALL
7. FEELING AFRAID AS IF SOMETHING AWFUL MIGHT HAPPEN: NOT AT ALL
6. BECOMING EASILY ANNOYED OR IRRITABLE: NOT AT ALL
4. TROUBLE RELAXING: NOT AT ALL
2. NOT BEING ABLE TO STOP OR CONTROL WORRYING: NOT AT ALL
GAD7 TOTAL SCORE: 0
3. WORRYING TOO MUCH ABOUT DIFFERENT THINGS: NOT AT ALL

## 2017-10-06 ASSESSMENT — PAIN SCALES - GENERAL: PAINLEVEL: MILD PAIN (2)

## 2017-10-06 ASSESSMENT — PATIENT HEALTH QUESTIONNAIRE - PHQ9: SUM OF ALL RESPONSES TO PHQ QUESTIONS 1-9: 0

## 2017-10-06 NOTE — MR AVS SNAPSHOT
"              After Visit Summary   10/6/2017    Katelyn Manley    MRN: [id number]           Patient Information     Date Of Birth          1955        Visit Information        Provider Department      10/6/2017 7:45 AM Robert Blakely MD [facility] [place]        Today's Diagnoses     Need for prophylactic vaccination and inoculation against influenza    -  1    Left wrist pain           Follow-ups after your visit        Your next 10 appointments already scheduled     Nov 08, 2017  9:00 AM CST   (Arrive by 8:45 AM)   Pre-Op physical with Robert Blakely MD   [facility] [place] ([facility] [place] )    [address]  [place] [address]   [phone]              Who to contact     If you have questions or need follow up information about today's clinic visit or your schedule please contact [facility] directly at [phone].  Normal or non-critical lab and imaging results will be communicated to you by MyChart, letter or phone within 4 business days after the clinic has received the results. If you do not hear from us within 7 days, please contact the clinic through MyChart or phone. If you have a critical or abnormal lab result, we will notify you by phone as soon as possible.  Submit refill requests through [employer] or call your pharmacy and they will forward the refill request to us. Please allow 3 business days for your refill to be completed.          Additional Information About Your Visit        MyChart Information     [employer] lets you send messages to your doctor, view your test results, renew your prescriptions, schedule appointments and more. To sign up, go to [email or web address] . Click on \"Log in\" on the left side of the screen, which will take you to the Welcome page. Then click on \"Sign up Now\" on the right side of the page.     You will be asked to enter the access code listed below, as well as some personal information. Please follow the " "directions to create your username and password.     Your access code is: [email or web address]  Expires: 10/22/2017 12:57 PM     Your access code will  in 90 days. If you need help or a new code, please call your [facility] or [phone].        Care EveryWhere ID     This is your Care EveryWhere ID. This could be used by other organizations to access your [facility] medical records  [id number]        Your Vitals Were     Pulse Temperature Height BMI (Body Mass Index)          78 96.2  F (35.7  C) 5' 3.25\" (1.607 m) 27.24 kg/m2         Blood Pressure from Last 3 Encounters:   10/06/17 112/70   17 112/70   17 120/80    Weight from Last 3 Encounters:   10/06/17 155 lb (70.3 kg)   17 156 lb (70.8 kg)   17 160 lb (72.6 kg)              We Performed the Following     HC FLU VAC PRESRV FREE QUAD SPLIT VIR 3+YRS IM     Vaccine Administration, Initial [[id number]]          Today's Medication Changes          These changes are accurate as of: 10/6/17  8:08 AM.  If you have any questions, ask your nurse or doctor.               Stop taking these medicines if you haven't already. Please contact your care team if you have questions.     sulfamethoxazole-trimethoprim 800-160 MG per tablet   Commonly known as:  BACTRIM DS/SEPTRA DS   Stopped by:  Robert Blakely MD                    Primary Care Provider Office Phone # Fax #    Robert Blakely [phone]       [facility] [address]  [facility] [address]        Equal Access to Services     [facility]: Hadii lamont Best, wakyleda lynda, qaybta sascha mcclellan. So [facility] [phone].    ATENCIÓN: Si habla español, tiene a bennett disposición servicios gratuitos de asistencia lingüística. Llame al [phone].    We comply with applicable federal civil rights laws and [place] laws. We do not discriminate on the basis of race, color, national origin, age, disability, " sex, sexual orientation, or gender identity.            Thank you!     Thank you for choosing [facility]  [facility] your [facility]. Our goal is always to provide you with excellent care. Hearing back from our patients is one way we can continue to improve our services. Please take a few minutes to complete the written survey that you may receive in the mail after your visit with us. Thank you!             Your Updated Medication List - Protect others around you: Learn how to safely use, store and throw away your medicines at www.disposemymeds.org.          This list is accurate as of: 10/6/17  8:08 AM.  Always use your most recent med list.                   Brand Name Dispense Instructions for use Diagnosis    COLACE PO      Take 100 mg by mouth daily        GLUCOSAMINE SULFATE PO      Take 2,000 mg by mouth daily        METAMUCIL PO           MULTIVITAMIN PO      Take 1 capsule by mouth daily.        nystatin-triamcinolone cream    MYCOLOG II     Apply topically daily

## 2017-10-06 NOTE — NURSING NOTE
"Chief Complaint   Patient presents with     Trauma       Initial /70  Pulse 78  Temp 96.2  F (35.7  C)  Ht 5' 3.25\" (1.607 m)  Wt 155 lb (70.3 kg)  BMI 27.24 kg/m2 Estimated body mass index is 27.24 kg/(m^2) as calculated from the following:    Height as of this encounter: 5' 3.25\" (1.607 m).    Weight as of this encounter: 155 lb (70.3 kg).  Medication Reconciliation: complete     Klaus Gillette      "

## 2017-10-06 NOTE — PROGRESS NOTES
"  SUBJECTIVE:   Katelyn Manley is a 62 year old female who presents to clinic today for the following health issues:      Musculoskeletal problem/pain      Duration: 1 week    Description  Location: left wrist    Intensity:  moderate    Accompanying signs and symptoms: swelling    History  Previous similar problem: no   Previous evaluation:  none    Precipitating or alleviating factors:  Trauma or overuse: no   Aggravating factors include: none    Therapies tried and outcome: nothing and Ibuprofen    Right handed    No trauma    No f/c/night sweats    Some risk for tick dz    Slowly improved    No other joints             Problem list and histories reviewed & adjusted, as indicated.  Additional history: as documented        Reviewed and updated as needed this visit by clinical staffTobacco  Allergies  Meds  Med Hx  Surg Hx  Fam Hx  Soc Hx      Reviewed and updated as needed this visit by Provider         ROS:  C: NEGATIVE for fever, chills, change in weight    OBJECTIVE:                                                    /70  Pulse 78  Temp 96.2  F (35.7  C)  Ht 5' 3.25\" (1.607 m)  Wt 155 lb (70.3 kg)  BMI 27.24 kg/m2  Body mass index is 27.24 kg/(m^2).   GENERAL: healthy, alert, well nourished, well hydrated, no distress  MS: extremities- left wrist no gross deformities noted, minimal  Edema, no redness or warmth . No crepitus or decrease ROM          ASSESSMENT/PLAN:                                                    (Z23) Need for prophylactic vaccination and inoculation against influenza  (primary encounter diagnosis)  Comment: shot given   Plan: HC FLU VAC PRESRV FREE QUAD SPLIT VIR 3+YRS IM,        Vaccine Administration, Initial [99672]            (M25.532) Left wrist pain  Comment: mild probable DJD flare or sprain   Plan: Getting better. Watch for now. Doubt tick borne. Discussed in length conservative measures of OTC medications for pain, Ice/Heat, elevation and the concept of R.I.C.E.. " Continue behavioral changes and proper body mechanics to allow for healing. Follow up as directed.           Robert Blakely MD  [facility] [place]  Injectable Influenza Immunization Documentation    1.  Is the person to be vaccinated sick today?   No    2. Does the person to be vaccinated have an allergy to a component   of the vaccine?   No    3. Has the person to be vaccinated ever had a serious reaction   to influenza vaccine in the past?   No    4. Has the person to be vaccinated ever had Guillain-Barré syndrome?   No    Form completed by Klaus Gillette

## 2017-10-07 ASSESSMENT — ANXIETY QUESTIONNAIRES: GAD7 TOTAL SCORE: 0

## 2017-11-08 ENCOUNTER — OFFICE VISIT (OUTPATIENT)
Dept: FAMILY MEDICINE | Facility: OTHER | Age: 62
End: 2017-11-08
Attending: FAMILY MEDICINE
Payer: COMMERCIAL

## 2017-11-08 VITALS
DIASTOLIC BLOOD PRESSURE: 78 MMHG | WEIGHT: 158 LBS | OXYGEN SATURATION: 98 % | SYSTOLIC BLOOD PRESSURE: 128 MMHG | BODY MASS INDEX: 28 KG/M2 | HEART RATE: 80 BPM | TEMPERATURE: 97.4 F | HEIGHT: 63 IN

## 2017-11-08 DIAGNOSIS — H25.9 AGE-RELATED CATARACT OF BOTH EYES, UNSPECIFIED AGE-RELATED CATARACT TYPE: ICD-10-CM

## 2017-11-08 DIAGNOSIS — Z01.818 PRE-OPERATIVE GENERAL PHYSICAL EXAMINATION: Primary | ICD-10-CM

## 2017-11-08 DIAGNOSIS — Z01.818 PREOP GENERAL PHYSICAL EXAM: ICD-10-CM

## 2017-11-08 LAB
ANION GAP SERPL CALCULATED.3IONS-SCNC: 8 MMOL/L (ref 3–14)
BASOPHILS # BLD AUTO: 0.1 10E9/L (ref 0–0.2)
BASOPHILS NFR BLD AUTO: 0.8 %
BUN SERPL-MCNC: 21 MG/DL (ref 7–30)
CALCIUM SERPL-MCNC: 9.1 MG/DL (ref 8.5–10.1)
CHLORIDE SERPL-SCNC: 108 MMOL/L (ref 94–109)
CO2 SERPL-SCNC: 24 MMOL/L (ref 20–32)
CREAT SERPL-MCNC: 0.96 MG/DL (ref 0.52–1.04)
DIFFERENTIAL METHOD BLD: NORMAL
EOSINOPHIL # BLD AUTO: 0.6 10E9/L (ref 0–0.7)
EOSINOPHIL NFR BLD AUTO: 7.2 %
ERYTHROCYTE [DISTWIDTH] IN BLOOD BY AUTOMATED COUNT: 13.4 % (ref 10–15)
GFR SERPL CREATININE-BSD FRML MDRD: 59 ML/MIN/1.7M2
GLUCOSE SERPL-MCNC: 99 MG/DL (ref 70–99)
HCT VFR BLD AUTO: 38.3 % (ref 35–47)
HGB BLD-MCNC: 13.3 G/DL (ref 11.7–15.7)
IMM GRANULOCYTES # BLD: 0 10E9/L (ref 0–0.4)
IMM GRANULOCYTES NFR BLD: 0.1 %
LYMPHOCYTES # BLD AUTO: 4.1 10E9/L (ref 0.8–5.3)
LYMPHOCYTES NFR BLD AUTO: 47.3 %
MCH RBC QN AUTO: 29.2 PG (ref 26.5–33)
MCHC RBC AUTO-ENTMCNC: 34.7 G/DL (ref 31.5–36.5)
MCV RBC AUTO: 84 FL (ref 78–100)
MONOCYTES # BLD AUTO: 0.6 10E9/L (ref 0–1.3)
MONOCYTES NFR BLD AUTO: 6.7 %
NEUTROPHILS # BLD AUTO: 3.3 10E9/L (ref 1.6–8.3)
NEUTROPHILS NFR BLD AUTO: 37.9 %
NRBC # BLD AUTO: 0 10*3/UL
NRBC BLD AUTO-RTO: 0 /100
PLATELET # BLD AUTO: 266 10E9/L (ref 150–450)
POTASSIUM SERPL-SCNC: 4.1 MMOL/L (ref 3.4–5.3)
RBC # BLD AUTO: 4.56 10E12/L (ref 3.8–5.2)
SODIUM SERPL-SCNC: 140 MMOL/L (ref 133–144)
WBC # BLD AUTO: 8.7 10E9/L (ref 4–11)

## 2017-11-08 PROCEDURE — 80048 BASIC METABOLIC PNL TOTAL CA: CPT | Performed by: FAMILY MEDICINE

## 2017-11-08 PROCEDURE — 99214 OFFICE O/P EST MOD 30 MIN: CPT | Performed by: FAMILY MEDICINE

## 2017-11-08 PROCEDURE — 36415 COLL VENOUS BLD VENIPUNCTURE: CPT | Performed by: FAMILY MEDICINE

## 2017-11-08 PROCEDURE — 85025 COMPLETE CBC W/AUTO DIFF WBC: CPT | Performed by: FAMILY MEDICINE

## 2017-11-08 PROCEDURE — 93000 ELECTROCARDIOGRAM COMPLETE: CPT | Performed by: INTERNAL MEDICINE

## 2017-11-08 ASSESSMENT — PATIENT HEALTH QUESTIONNAIRE - PHQ9: SUM OF ALL RESPONSES TO PHQ QUESTIONS 1-9: 1

## 2017-11-08 ASSESSMENT — ANXIETY QUESTIONNAIRES
GAD7 TOTAL SCORE: 0
7. FEELING AFRAID AS IF SOMETHING AWFUL MIGHT HAPPEN: NOT AT ALL
1. FEELING NERVOUS, ANXIOUS, OR ON EDGE: NOT AT ALL
5. BEING SO RESTLESS THAT IT IS HARD TO SIT STILL: NOT AT ALL
2. NOT BEING ABLE TO STOP OR CONTROL WORRYING: NOT AT ALL
6. BECOMING EASILY ANNOYED OR IRRITABLE: NOT AT ALL
4. TROUBLE RELAXING: NOT AT ALL
3. WORRYING TOO MUCH ABOUT DIFFERENT THINGS: NOT AT ALL

## 2017-11-08 ASSESSMENT — PAIN SCALES - GENERAL: PAINLEVEL: NO PAIN (0)

## 2017-11-08 NOTE — PROGRESS NOTES
[facility] [place]  [address]  [place] [address]  [phone]  Dept: [phone]    PRE-OP EVALUATION:  Today's date: 2017    Katelyn Manley (: 1955) presents for pre-operative evaluation assessment as requested by Dr. Snow.  She requires evaluation and anesthesia risk assessment prior to undergoing surgery/procedure for treatment of cataracts .  Proposed procedure: L eye cataract    Date of Surgery/ Procedure: 17  Time of Surgery/ Procedure: [facility]Surgical Facility: [facility]  Primary Physician: Robert Blakely  Type of Anesthesia Anticipated: to be determined    Katelyn Manley (: 1955) presents for pre-operative evaluation assessment as requested by Dr. Snow.  She requires evaluation and anesthesia risk assessment prior to undergoing surgery/procedure for treatment of cataracts .  Proposed procedure: R eye cataract    Date of Surgery/ Procedure: 17  Time of Surgery/ Procedure: [facility]/Surgical Facility: [facility]  Primary Physician: Robert Blakely  Type of Anesthesia Anticipated: to be determined    Patient has a Health Care Directive or Living Will:  YES     1. NO - Do you have a history of heart attack, stroke, stent, bypass or surgery on an artery in the head, neck, heart or legs?  2. NO - Do you ever have any pain or discomfort in your chest?  3. NO - Do you have a history of  Heart Failure?  4. NO - Are you troubled by shortness of breath when: walking on the level, up a slight hill or at night?  5. NO - Do you currently have a cold, bronchitis or other respiratory infection?  6. NO - Do you have a cough, shortness of breath or wheezing?  7. NO - Do you sometimes get pains in the calves of your legs when you walk?  8. NO - Do you or anyone in your family have previous history of blood clots?  9. NO - Do you or does anyone in your family have a serious bleeding problem such as prolonged bleeding following surgeries or cuts?  10. NO - Have you ever had  problems with anemia or been told to take iron pills?  11. NO - Have you had any abnormal blood loss such as black, tarry or bloody stools, or abnormal vaginal bleeding?  12. NO - Have you ever had a blood transfusion?  13. NO - Have you or any of your relatives ever had problems with anesthesia?  14. NO - Do you have sleep apnea, excessive snoring or daytime drowsiness?  15. NO - Do you have any prosthetic heart valves?  16. YES - DO YOU HAVE PROSTHETIC JOINTS?  Both knees  17. NO - Is there any chance that you may be pregnant?        HPI:                                                      Brief HPI related to upcoming procedure:   61 yO female  presents for cardiopulmonary/general clearance to undergo the above procedure.  His surgeon listed above has asked for this clearance to undergo anesthesia. Pt has had this condition for approximately over a year or so.   Overall pt is of good health and has not had any perioperative complications with previous surgeries.              MEDICAL HISTORY:                                                    Patient Active Problem List    Diagnosis Date Noted     Status post vaginal hysterectomy 09/12/2016     Priority: Medium     Bso,sling,post repair, [id number]       Dyslipidemia 05/15/2014     Priority: Medium     Lichen sclerosus 09/16/2013     Priority: Medium     Leukoplakia 07/03/2013     Priority: Medium     Pre-diabetes 05/06/2013     Priority: Medium      Past Medical History:   Diagnosis Date     Cyst of Bartholin's gland 10/22/2007     Diverticulitis of sigmoid colon 8/23/2011     Endometrial hyperplasia without atypia, simple      Glucose intolerance (impaired glucose tolerance) 790.22     Hypercholesterolemia 8/23/2011     Menopause 5/6/2013     Osteoarthrosis, unspecified whether generalized or localized, lower leg 7/9/2002     S/P vaginal hysterectomy 2/19/2015     Special screening for malignant neoplasms, colon 10/15/2004     Status post total knee replacement  8/23/2011     Past Surgical History:   Procedure Laterality Date     COLONOSCOPY  2004     COLONOSCOPY  2-    Dr Sanchez/ sigmoid diverticular dz     COLPORRHAPHY POSTERIOR N/A 2/18/2015    Procedure: COLPORRHAPHY POSTERIOR;  Surgeon: Vasquez Sauer MD;  Location: [facility]     CYSTOSCOPY N/A 2/18/2015    Procedure: CYSTOSCOPY;  Surgeon: Vasquez Sauer MD;  Location: [facility]     DILATION AND CURETTAGE, HYSTEROSCOPY DIAGNOSTIC, COMBINED  5/7/2014    Procedure: COMBINED DILATION AND CURETTAGE, HYSTEROSCOPY DIAGNOSTIC;  Surgeon: Vasquez Sauer MD;  Location: [facility]     EYE SURGERY Left 08/2016    macular hole repair     HYSTERECTOMY VAGINAL, BILATERAL SALPINGO-OOPHERECTOMY, COMBINED N/A 2/18/2015    Procedure: COMBINED HYSTERECTOMY VAGINAL, SALPINGO-OOPHORECTOMY;  Surgeon: Vasquez Sauer MD;  Location: [facility]     JOINT REPLACEMENT  unijoint    right     JOINT REPLACEMENT  full joint    x2 - left     SLING TRANSOBTURATOR N/A 2/18/2015    Procedure: SLING TRANSOBTURATOR;  Surgeon: Vasquez Sauer MD;  Location: [facility]     Current Outpatient Prescriptions   Medication Sig Dispense Refill     nystatin-triamcinolone (MYCOLOG II) cream Apply topically daily       GLUCOSAMINE SULFATE PO Take 2,000 mg by mouth daily       Docusate Sodium (COLACE PO) Take 100 mg by mouth daily       Psyllium (METAMUCIL PO)        Multiple Vitamins-Minerals (MULTIVITAMIN OR) Take 1 capsule by mouth daily.       OTC products: None, except as noted above    Allergies   Allergen Reactions     Cats      Cat/feline product derivatives     Dogs      Horse-Derived Products      And cows      Latex Allergy: NO    Social History   Substance Use Topics     Smoking status: Never Smoker     Smokeless tobacco: Never Used     Alcohol use No     History   Drug Use No       REVIEW OF SYSTEMS:                                                    Constitutional, neuro, ENT, endocrine, pulmonary, cardiac, gastrointestinal, genitourinary, musculoskeletal, integument and  "psychiatric systems are negative, except as otherwise noted.      EXAM:                                                    /78 (BP Location: Right arm, Patient Position: Chair, Cuff Size: Adult Regular)  Pulse 80  Temp 97.4  F (36.3  C) (Tympanic)  Ht 5' 3.25\" (1.607 m)  Wt 158 lb (71.7 kg)  SpO2 98%  BMI 27.77 kg/m2    GENERAL APPEARANCE: healthy, alert and no distress     EYES: EOMI, PERRL     HENT: ear canals and TM's normal and nose and mouth without ulcers or lesions     NECK: no adenopathy, no asymmetry, masses, or scars and thyroid normal to palpation     RESP: lungs clear to auscultation - no rales, rhonchi or wheezes     CV: regular rates and rhythm, normal S1 S2, no S3 or S4 and no murmur, click or rub     ABDOMEN:  soft, nontender, no HSM or masses and bowel sounds normal     MS: extremities normal- no gross deformities noted, no evidence of inflammation in joints, FROM in all extremities.     SKIN: no suspicious lesions or rashes     NEURO: Normal strength and tone, sensory exam grossly normal, mentation intact and speech normal     PSYCH: mentation appears normal. and affect normal/bright     LYMPHATICS: No axillary, cervical, or supraclavicular nodes    DIAGNOSTICS:                                                    EKG: appears normal, NSR, normal axis, normal intervals, no acute ST/T changes c/w ischemia, no LVH by voltage criteria, unchanged from previous tracings    Recent Labs   Lab Test  11/08/17   0853  09/29/17   0811  09/15/16   0754  07/15/16   0958   01/16/14   0700   HGB  13.3   --   13.0  13.0   < >  12.9   PLT  266   --   259  280   < >  261   INR   --    --    --    --    --   1.11   NA  140   --    --   140   < >  137   POTASSIUM  4.1   --    --   3.9   < >  3.8   CR  0.96   --    --   0.95   < >  0.99   A1C   --   5.6  5.9   --    < >   --     < > = values in this interval not displayed.      Results for orders placed or performed in visit on 11/08/17 (from the past 24 " hour(s))   CBC with platelets differential   Result Value Ref Range    WBC 8.7 4.0 - 11.0 10e9/L    RBC Count 4.56 3.8 - 5.2 10e12/L    Hemoglobin 13.3 11.7 - 15.7 g/dL    Hematocrit 38.3 35.0 - 47.0 %    MCV 84 78 - 100 fl    MCH 29.2 26.5 - 33.0 pg    MCHC 34.7 31.5 - 36.5 g/dL    RDW 13.4 10.0 - 15.0 %    Platelet Count 266 150 - 450 10e9/L    Diff Method Automated Method     % Neutrophils 37.9 %    % Lymphocytes 47.3 %    % Monocytes 6.7 %    % Eosinophils 7.2 %    % Basophils 0.8 %    % Immature Granulocytes 0.1 %    Nucleated RBCs 0 0 /100    Absolute Neutrophil 3.3 1.6 - 8.3 10e9/L    Absolute Lymphocytes 4.1 0.8 - 5.3 10e9/L    Absolute Monocytes 0.6 0.0 - 1.3 10e9/L    Absolute Eosinophils 0.6 0.0 - 0.7 10e9/L    Absolute Basophils 0.1 0.0 - 0.2 10e9/L    Abs Immature Granulocytes 0.0 0 - 0.4 10e9/L    Absolute Nucleated RBC 0.0    Basic metabolic panel   Result Value Ref Range    Sodium 140 133 - 144 mmol/L    Potassium 4.1 3.4 - 5.3 mmol/L    Chloride 108 94 - 109 mmol/L    Carbon Dioxide 24 20 - 32 mmol/L    Anion Gap 8 3 - 14 mmol/L    Glucose 99 70 - 99 mg/dL    Urea Nitrogen 21 7 - 30 mg/dL    Creatinine 0.96 0.52 - 1.04 mg/dL    GFR Estimate 59 (L) >60 mL/min/1.7m2    GFR Estimate If Black 71 >60 mL/min/1.7m2    Calcium 9.1 8.5 - 10.1 mg/dL         IMPRESSION:                                                    Reason for surgery/procedure: bilateral cataracts   Diagnosis/reason for consult: General clearance    The proposed surgical procedure is considered LOW risk.    REVISED CARDIAC RISK INDEX  The patient has the following serious cardiovascular risks for perioperative complications such as (MI, PE, VFib and 3  AV Block):  No serious cardiac risks  INTERPRETATION: 1 risks: Class II (low risk - 0.9% complication rate)    The patient has the following additional risks for perioperative complications:  No identified additional risks      ICD-10-CM    1. Pre-operative general physical examination  Z01.818 CBC with platelets differential     Basic metabolic panel     EKG 12-lead complete w/read - Clinics     CBC with platelets differential     Basic metabolic panel     EKG 12-lead complete w/read - Clinics   2. Age-related cataract of both eyes, unspecified age-related cataract type H25.9 CBC with platelets differential     Basic metabolic panel     EKG 12-lead complete w/read - Clinics     CBC with platelets differential     Basic metabolic panel     EKG 12-lead complete w/read - Clinics       RECOMMENDATIONS:                                                              APPROVAL GIVEN to proceed with proposed procedure, without further diagnostic evaluation       Signed Electronically by: Robert Blakely MD    Copy of this evaluation report is provided to requesting physician.    [facility] Preop Guidelines

## 2017-11-08 NOTE — NURSING NOTE
"Chief Complaint   Patient presents with     Pre-Op Exam       Initial /78 (BP Location: Right arm, Patient Position: Chair, Cuff Size: Adult Regular)  Pulse 80  Temp 97.4  F (36.3  C) (Tympanic)  Ht 5' 3.25\" (1.607 m)  Wt 158 lb (71.7 kg)  SpO2 98%  BMI 27.77 kg/m2 Estimated body mass index is 27.77 kg/(m^2) as calculated from the following:    Height as of this encounter: 5' 3.25\" (1.607 m).    Weight as of this encounter: 158 lb (71.7 kg).  Medication Reconciliation: complete     Deann Del Valle     "

## 2017-11-08 NOTE — PATIENT INSTRUCTIONS
Before Your Surgery      Call your surgeon if there is any change in your health. This includes signs of a cold or flu (such as a sore throat, runny nose, cough, rash or fever).    Do not smoke, drink alcohol or take over the counter medicine (unless your surgeon or primary care doctor tells you to) for the 24 hours before and after surgery.    If you take prescribed drugs: Follow your doctor s orders about which medicines to take and which to stop until after surgery.    Eating and drinking prior to surgery: follow the instructions from your surgeon    Take a shower or bath the night before surgery. Use the soap your surgeon gave you to gently clean your skin. If you do not have soap from your surgeon, use your regular soap. Do not shave or scrub the surgery site.  Wear clean pajamas and have clean sheets on your bed.     Results for orders placed or performed in visit on 11/08/17 (from the past 24 hour(s))   CBC with platelets differential   Result Value Ref Range    WBC 8.7 4.0 - 11.0 10e9/L    RBC Count 4.56 3.8 - 5.2 10e12/L    Hemoglobin 13.3 11.7 - 15.7 g/dL    Hematocrit 38.3 35.0 - 47.0 %    MCV 84 78 - 100 fl    MCH 29.2 26.5 - 33.0 pg    MCHC 34.7 31.5 - 36.5 g/dL    RDW 13.4 10.0 - 15.0 %    Platelet Count 266 150 - 450 10e9/L    Diff Method Automated Method     % Neutrophils 37.9 %    % Lymphocytes 47.3 %    % Monocytes 6.7 %    % Eosinophils 7.2 %    % Basophils 0.8 %    % Immature Granulocytes 0.1 %    Nucleated RBCs 0 0 /100    Absolute Neutrophil 3.3 1.6 - 8.3 10e9/L    Absolute Lymphocytes 4.1 0.8 - 5.3 10e9/L    Absolute Monocytes 0.6 0.0 - 1.3 10e9/L    Absolute Eosinophils 0.6 0.0 - 0.7 10e9/L    Absolute Basophils 0.1 0.0 - 0.2 10e9/L    Abs Immature Granulocytes 0.0 0 - 0.4 10e9/L    Absolute Nucleated RBC 0.0    Basic metabolic panel   Result Value Ref Range    Sodium 140 133 - 144 mmol/L    Potassium 4.1 3.4 - 5.3 mmol/L    Chloride 108 94 - 109 mmol/L    Carbon Dioxide 24 20 - 32 mmol/L     Anion Gap 8 3 - 14 mmol/L    Glucose 99 70 - 99 mg/dL    Urea Nitrogen 21 7 - 30 mg/dL    Creatinine 0.96 0.52 - 1.04 mg/dL    GFR Estimate 59 (L) >60 mL/min/1.7m2    GFR Estimate If Black 71 >60 mL/min/1.7m2    Calcium 9.1 8.5 - 10.1 mg/dL

## 2017-11-08 NOTE — MR AVS SNAPSHOT
After Visit Summary   11/8/2017    Katelyn Manley    MRN: [id number]           Patient Information     Date Of Birth          1955        Visit Information        Provider Department      11/8/2017 9:00 AM Robert Blakely MD [facility]        Today's Diagnoses     Pre-operative general physical examination    -  1    Age-related cataract of both eyes, unspecified age-related cataract type        Preop general physical exam          Care Instructions      Before Your Surgery      Call your surgeon if there is any change in your health. This includes signs of a cold or flu (such as a sore throat, runny nose, cough, rash or fever).    Do not smoke, drink alcohol or take over the counter medicine (unless your surgeon or primary care doctor tells you to) for the 24 hours before and after surgery.    If you take prescribed drugs: Follow your doctor s orders about which medicines to take and which to stop until after surgery.    Eating and drinking prior to surgery: follow the instructions from your surgeon    Take a shower or bath the night before surgery. Use the soap your surgeon gave you to gently clean your skin. If you do not have soap from your surgeon, use your regular soap. Do not shave or scrub the surgery site.  Wear clean pajamas and have clean sheets on your bed.     Results for orders placed or performed in visit on 11/08/17 (from the past 24 hour(s))   CBC with platelets differential   Result Value Ref Range    WBC 8.7 4.0 - 11.0 10e9/L    RBC Count 4.56 3.8 - 5.2 10e12/L    Hemoglobin 13.3 11.7 - 15.7 g/dL    Hematocrit 38.3 35.0 - 47.0 %    MCV 84 78 - 100 fl    MCH 29.2 26.5 - 33.0 pg    MCHC 34.7 31.5 - 36.5 g/dL    RDW 13.4 10.0 - 15.0 %    Platelet Count 266 150 - 450 10e9/L    Diff Method Automated Method     % Neutrophils 37.9 %    % Lymphocytes 47.3 %    % Monocytes 6.7 %    % Eosinophils 7.2 %    % Basophils 0.8 %    % Immature Granulocytes 0.1 %    Nucleated RBCs 0  0 /100    Absolute Neutrophil 3.3 1.6 - 8.3 10e9/L    Absolute Lymphocytes 4.1 0.8 - 5.3 10e9/L    Absolute Monocytes 0.6 0.0 - 1.3 10e9/L    Absolute Eosinophils 0.6 0.0 - 0.7 10e9/L    Absolute Basophils 0.1 0.0 - 0.2 10e9/L    Abs Immature Granulocytes 0.0 0 - 0.4 10e9/L    Absolute Nucleated RBC 0.0    Basic metabolic panel   Result Value Ref Range    Sodium 140 133 - 144 mmol/L    Potassium 4.1 3.4 - 5.3 mmol/L    Chloride 108 94 - 109 mmol/L    Carbon Dioxide 24 20 - 32 mmol/L    Anion Gap 8 3 - 14 mmol/L    Glucose 99 70 - 99 mg/dL    Urea Nitrogen 21 7 - 30 mg/dL    Creatinine 0.96 0.52 - 1.04 mg/dL    GFR Estimate 59 (L) >60 mL/min/1.7m2    GFR Estimate If Black 71 >60 mL/min/1.7m2    Calcium 9.1 8.5 - 10.1 mg/dL               Follow-ups after your visit        Your next 10 appointments already scheduled     Nov 14, 2017   Procedure with Jerome Snow MD   [facility] ([facility] )    [address] [place] [id number] [phone]            Oct 24, 2018 10:00 AM CDT   (Arrive by 9:45 AM)   PHYSICAL with Rosio Matamoros MD   [facility] ([facility] [place] )    [address] [place] [id number]   [phone]              Who to contact     If you have questions or need follow up information about today's clinic visit or your schedule please contact [facility] directly at [phone].  Normal or non-critical lab and imaging results will be communicated to you by MyChart, letter or phone within 4 business days after the clinic has received the results. If you do not hear from us within 7 days, please contact the clinic through MyChart or phone. If you have a critical or abnormal lab result, we will notify you by phone as soon as possible.  Submit refill requests through [employer] or call your pharmacy and they will forward the refill request to us. Please allow 3 business days for your refill to be completed.          Additional  "Information About Your Visit        MyChart Information     [email or web address] lets you send messages to your doctor, view your test results, renew your prescriptions, schedule appointments and more. To sign up, go to [email or web address] . Click on \"Log in\" on the left side of the screen, which will take you to the Welcome page. Then click on \"Sign up Now\" on the right side of the page.     You will be asked to enter the access code listed below, as well as some personal information. Please follow the directions to create your username and password.     Your access code is: [email or web address]  Expires: 2018  9:35 AM     Your access code will  in 90 days. If you need help or a new code, please call your [place] clinic or [phone].        Care EveryWhere ID     This is your [facility] EveryWhere ID. This could be used by other organizations to access your [place] medical records  [id number]        Your Vitals Were     Pulse Temperature Height Pulse Oximetry BMI (Body Mass Index)       80 97.4  F (36.3  C) (Tympanic) 5' 3.25\" (1.607 m) 98% 27.77 kg/m2        Blood Pressure from Last 3 Encounters:   17 128/78   10/06/17 112/70   17 112/70    Weight from Last 3 Encounters:   17 158 lb (71.7 kg)   10/06/17 155 lb (70.3 kg)   17 156 lb (70.8 kg)              We Performed the Following     Basic metabolic panel     CBC with platelets differential        Primary Care Provider Office Phone # Fax #    Robert Blakely [phone]       [facility] [address]  [place] [address]        Equal Access to Services     [facility] RAJAT AH: Trena Best, doron howell, shyla bentley, sascha bond. So [facility] [phone].    ATENCIÓN: Si habla español, tiene a bennett disposición servicios gratuitos de asistencia lingüística. Llame al [phone].    We comply with applicable federal civil rights laws and [place] laws. We do not " discriminate on the basis of race, color, national origin, age, disability, sex, sexual orientation, or gender identity.            Thank you!     Thank you for choosing [facility] [place]  for your care. Our goal is always to provide you with excellent care. Hearing back from our patients is one way we can continue to improve our services. Please take a few minutes to complete the written survey that you may receive in the mail after your visit with us. Thank you!             Your Updated Medication List - Protect others around you: Learn how to safely use, store and throw away your medicines at www.disposemymeds.org.          This list is accurate as of: 11/8/17  9:35 AM.  Always use your most recent med list.                   Brand Name Dispense Instructions for use Diagnosis    COLACE PO      Take 100 mg by mouth daily        GLUCOSAMINE SULFATE PO      Take 2,000 mg by mouth daily        METAMUCIL PO           MULTIVITAMIN PO      Take 1 capsule by mouth daily.        nystatin-triamcinolone cream    MYCOLOG II     Apply topically daily

## 2017-11-09 ASSESSMENT — ANXIETY QUESTIONNAIRES: GAD7 TOTAL SCORE: 0

## 2017-11-10 NOTE — H&P (VIEW-ONLY)
[facility] [place]  [address]  [place] [address]  [phone]  Dept: [phone]    PRE-OP EVALUATION:  Today's date: 2017    Katelyn Manley (: 1955) presents for pre-operative evaluation assessment as requested by Dr. Snow.  She requires evaluation and anesthesia risk assessment prior to undergoing surgery/procedure for treatment of cataracts .  Proposed procedure: L eye cataract    Date of Surgery/ Procedure: 17  Time of Surgery/ Procedure: [facility]: [facility]  Primary Physician: Robert Blakely  Type of Anesthesia Anticipated: to be determined    Katelyn Manley (: 1955) presents for pre-operative evaluation assessment as requested by Dr. Snow.  She requires evaluation and anesthesia risk assessment prior to undergoing surgery/procedure for treatment of cataracts .  Proposed procedure: R eye cataract    Date of Surgery/ Procedure: 17  Time of Surgery/ Procedure: [facility]: [facility]  Primary Physician: Robert Blakely  Type of Anesthesia Anticipated: to be determined    Patient has a Health Care Directive or Living Will:  YES     1. NO - Do you have a history of heart attack, stroke, stent, bypass or surgery on an artery in the head, neck, heart or legs?  2. NO - Do you ever have any pain or discomfort in your chest?  3. NO - Do you have a history of  Heart Failure?  4. NO - Are you troubled by shortness of breath when: walking on the level, up a slight hill or at night?  5. NO - Do you currently have a cold, bronchitis or other respiratory infection?  6. NO - Do you have a cough, shortness of breath or wheezing?  7. NO - Do you sometimes get pains in the calves of your legs when you walk?  8. NO - Do you or anyone in your family have previous history of blood clots?  9. NO - Do you or does anyone in your family have a serious bleeding problem such as prolonged bleeding following surgeries or cuts?  10. NO - Have you ever had  problems with anemia or been told to take iron pills?  11. NO - Have you had any abnormal blood loss such as black, tarry or bloody stools, or abnormal vaginal bleeding?  12. NO - Have you ever had a blood transfusion?  13. NO - Have you or any of your relatives ever had problems with anesthesia?  14. NO - Do you have sleep apnea, excessive snoring or daytime drowsiness?  15. NO - Do you have any prosthetic heart valves?  16. YES - DO YOU HAVE PROSTHETIC JOINTS?  Both knees  17. NO - Is there any chance that you may be pregnant?        HPI:                                                      Brief HPI related to upcoming procedure:   63 yO female  presents for cardiopulmonary/general clearance to undergo the above procedure.  His surgeon listed above has asked for this clearance to undergo anesthesia. Pt has had this condition for approximately over a year or so.   Overall pt is of good health and has not had any perioperative complications with previous surgeries.              MEDICAL HISTORY:                                                    Patient Active Problem List    Diagnosis Date Noted     Status post vaginal hysterectomy 09/12/2016     Priority: Medium     Bso,sling,post repair, [id number]       Dyslipidemia 05/15/2014     Priority: Medium     Lichen sclerosus 09/16/2013     Priority: Medium     Leukoplakia 07/03/2013     Priority: Medium     Pre-diabetes 05/06/2013     Priority: Medium      Past Medical History:   Diagnosis Date     Cyst of Bartholin's gland 10/22/2007     Diverticulitis of sigmoid colon 8/23/2011     Endometrial hyperplasia without atypia, simple      Glucose intolerance (impaired glucose tolerance) 790.22     Hypercholesterolemia 8/23/2011     Menopause 5/6/2013     Osteoarthrosis, unspecified whether generalized or localized, lower leg 7/9/2002     S/P vaginal hysterectomy 2/19/2015     Special screening for malignant neoplasms, colon 10/15/2004     Status post total knee replacement  8/23/2011     Past Surgical History:   Procedure Laterality Date     COLONOSCOPY  2004     COLONOSCOPY  2-    Dr Sanchez/ sigmoid diverticular dz     COLPORRHAPHY POSTERIOR N/A 2/18/2015    Procedure: COLPORRHAPHY POSTERIOR;  Surgeon: Vasquez Sauer MD;  Location: [facility]     CYSTOSCOPY N/A 2/18/2015    Procedure: CYSTOSCOPY;  Surgeon: Vasquez Sauer MD;  Location: [facility]     DILATION AND CURETTAGE, HYSTEROSCOPY DIAGNOSTIC, COMBINED  5/7/2014    Procedure: COMBINED DILATION AND CURETTAGE, HYSTEROSCOPY DIAGNOSTIC;  Surgeon: Vasquez Sauer MD;  Location: [facility]     EYE SURGERY Left 08/2016    macular hole repair     HYSTERECTOMY VAGINAL, BILATERAL SALPINGO-OOPHERECTOMY, COMBINED N/A 2/18/2015    Procedure: COMBINED HYSTERECTOMY VAGINAL, SALPINGO-OOPHORECTOMY;  Surgeon: Vasquez Sauer MD;  Location: [facility]     JOINT REPLACEMENT  unijoint    right     JOINT REPLACEMENT  full joint    x2 - left     SLING TRANSOBTURATOR N/A 2/18/2015    Procedure: SLING TRANSOBTURATOR;  Surgeon: Vasquez Sauer MD;  Location: [facility]     Current Outpatient Prescriptions   Medication Sig Dispense Refill     nystatin-triamcinolone (MYCOLOG II) cream Apply topically daily       GLUCOSAMINE SULFATE PO Take 2,000 mg by mouth daily       Docusate Sodium (COLACE PO) Take 100 mg by mouth daily       Psyllium (METAMUCIL PO)        Multiple Vitamins-Minerals (MULTIVITAMIN OR) Take 1 capsule by mouth daily.       OTC products: None, except as noted above    Allergies   Allergen Reactions     Cats      Cat/feline product derivatives     Dogs      Horse-Derived Products      And cows      Latex Allergy: NO    Social History   Substance Use Topics     Smoking status: Never Smoker     Smokeless tobacco: Never Used     Alcohol use No     History   Drug Use No       REVIEW OF SYSTEMS:                                                    Constitutional, neuro, ENT, endocrine, pulmonary, cardiac, gastrointestinal, genitourinary, musculoskeletal, integument and  "psychiatric systems are negative, except as otherwise noted.      EXAM:                                                    /78 (BP Location: Right arm, Patient Position: Chair, Cuff Size: Adult Regular)  Pulse 80  Temp 97.4  F (36.3  C) (Tympanic)  Ht 5' 3.25\" (1.607 m)  Wt 158 lb (71.7 kg)  SpO2 98%  BMI 27.77 kg/m2    GENERAL APPEARANCE: healthy, alert and no distress     EYES: EOMI, PERRL     HENT: ear canals and TM's normal and nose and mouth without ulcers or lesions     NECK: no adenopathy, no asymmetry, masses, or scars and thyroid normal to palpation     RESP: lungs clear to auscultation - no rales, rhonchi or wheezes     CV: regular rates and rhythm, normal S1 S2, no S3 or S4 and no murmur, click or rub     ABDOMEN:  soft, nontender, no HSM or masses and bowel sounds normal     MS: extremities normal- no gross deformities noted, no evidence of inflammation in joints, FROM in all extremities.     SKIN: no suspicious lesions or rashes     NEURO: Normal strength and tone, sensory exam grossly normal, mentation intact and speech normal     PSYCH: mentation appears normal. and affect normal/bright     LYMPHATICS: No axillary, cervical, or supraclavicular nodes    DIAGNOSTICS:                                                    EKG: appears normal, NSR, normal axis, normal intervals, no acute ST/T changes c/w ischemia, no LVH by voltage criteria, unchanged from previous tracings    Recent Labs   Lab Test  11/08/17   0853  09/29/17   0811  09/15/16   0754  07/15/16   0958   01/16/14   0700   HGB  13.3   --   13.0  13.0   < >  12.9   PLT  266   --   259  280   < >  261   INR   --    --    --    --    --   1.11   NA  140   --    --   140   < >  137   POTASSIUM  4.1   --    --   3.9   < >  3.8   CR  0.96   --    --   0.95   < >  0.99   A1C   --   5.6  5.9   --    < >   --     < > = values in this interval not displayed.      Results for orders placed or performed in visit on 11/08/17 (from the past 24 " hour(s))   CBC with platelets differential   Result Value Ref Range    WBC 8.7 4.0 - 11.0 10e9/L    RBC Count 4.56 3.8 - 5.2 10e12/L    Hemoglobin 13.3 11.7 - 15.7 g/dL    Hematocrit 38.3 35.0 - 47.0 %    MCV 84 78 - 100 fl    MCH 29.2 26.5 - 33.0 pg    MCHC 34.7 31.5 - 36.5 g/dL    RDW 13.4 10.0 - 15.0 %    Platelet Count 266 150 - 450 10e9/L    Diff Method Automated Method     % Neutrophils 37.9 %    % Lymphocytes 47.3 %    % Monocytes 6.7 %    % Eosinophils 7.2 %    % Basophils 0.8 %    % Immature Granulocytes 0.1 %    Nucleated RBCs 0 0 /100    Absolute Neutrophil 3.3 1.6 - 8.3 10e9/L    Absolute Lymphocytes 4.1 0.8 - 5.3 10e9/L    Absolute Monocytes 0.6 0.0 - 1.3 10e9/L    Absolute Eosinophils 0.6 0.0 - 0.7 10e9/L    Absolute Basophils 0.1 0.0 - 0.2 10e9/L    Abs Immature Granulocytes 0.0 0 - 0.4 10e9/L    Absolute Nucleated RBC 0.0    Basic metabolic panel   Result Value Ref Range    Sodium 140 133 - 144 mmol/L    Potassium 4.1 3.4 - 5.3 mmol/L    Chloride 108 94 - 109 mmol/L    Carbon Dioxide 24 20 - 32 mmol/L    Anion Gap 8 3 - 14 mmol/L    Glucose 99 70 - 99 mg/dL    Urea Nitrogen 21 7 - 30 mg/dL    Creatinine 0.96 0.52 - 1.04 mg/dL    GFR Estimate 59 (L) >60 mL/min/1.7m2    GFR Estimate If Black 71 >60 mL/min/1.7m2    Calcium 9.1 8.5 - 10.1 mg/dL         IMPRESSION:                                                    Reason for surgery/procedure: bilateral cataracts   Diagnosis/reason for consult: General clearance    The proposed surgical procedure is considered LOW risk.    REVISED CARDIAC RISK INDEX  The patient has the following serious cardiovascular risks for perioperative complications such as (MI, PE, VFib and 3  AV Block):  No serious cardiac risks  INTERPRETATION: 1 risks: Class II (low risk - 0.9% complication rate)    The patient has the following additional risks for perioperative complications:  No identified additional risks      ICD-10-CM    1. Pre-operative general physical examination  Z01.818 CBC with platelets differential     Basic metabolic panel     EKG 12-lead complete w/read - Clinics     CBC with platelets differential     Basic metabolic panel     EKG 12-lead complete w/read - Clinics   2. Age-related cataract of both eyes, unspecified age-related cataract type H25.9 CBC with platelets differential     Basic metabolic panel     EKG 12-lead complete w/read - Clinics     CBC with platelets differential     Basic metabolic panel     EKG 12-lead complete w/read - Clinics       RECOMMENDATIONS:                                                              APPROVAL GIVEN to proceed with proposed procedure, without further diagnostic evaluation       Signed Electronically by: Robert Blakely MD    Copy of this evaluation report is provided to requesting physician.    [facility] Preop Guidelines

## 2017-11-14 ENCOUNTER — ANESTHESIA (OUTPATIENT)
Dept: SURGERY | Facility: HOSPITAL | Age: 62
End: 2017-11-14

## 2017-11-14 ENCOUNTER — HOSPITAL ENCOUNTER (OUTPATIENT)
Facility: HOSPITAL | Age: 62
Discharge: HOME | End: 2017-11-14
Attending: OPHTHALMOLOGY | Admitting: OPHTHALMOLOGY
Payer: COMMERCIAL

## 2017-11-14 ENCOUNTER — ANESTHESIA EVENT (OUTPATIENT)
Dept: SURGERY | Facility: HOSPITAL | Age: 62
End: 2017-11-14

## 2017-11-14 RX ORDER — PROPARACAINE HYDROCHLORIDE 5 MG/ML
1 SOLUTION/ DROPS OPHTHALMIC ONCE
Status: CANCELLED | OUTPATIENT
Start: 2017-11-14 | End: 2017-11-14

## 2017-11-14 RX ORDER — NALOXONE HYDROCHLORIDE 0.4 MG/ML
.1-.4 INJECTION, SOLUTION INTRAMUSCULAR; INTRAVENOUS; SUBCUTANEOUS
Status: CANCELLED | OUTPATIENT
Start: 2017-11-14 | End: 2017-11-15

## 2017-11-14 RX ORDER — DIAZEPAM 2 MG
2 TABLET ORAL ONCE
Status: CANCELLED | OUTPATIENT
Start: 2017-11-14 | End: 2017-11-14

## 2017-11-14 RX ORDER — SODIUM CHLORIDE, SODIUM LACTATE, POTASSIUM CHLORIDE, CALCIUM CHLORIDE 600; 310; 30; 20 MG/100ML; MG/100ML; MG/100ML; MG/100ML
INJECTION, SOLUTION INTRAVENOUS CONTINUOUS
Status: CANCELLED | OUTPATIENT
Start: 2017-11-14

## 2017-11-14 RX ORDER — CYCLOPENTOLATE HYDROCHLORIDE 20 MG/ML
1 SOLUTION/ DROPS OPHTHALMIC
Status: CANCELLED | OUTPATIENT
Start: 2017-11-14 | End: 2017-11-14

## 2017-11-14 RX ORDER — ONDANSETRON 4 MG/1
4 TABLET, ORALLY DISINTEGRATING ORAL EVERY 30 MIN PRN
Status: CANCELLED | OUTPATIENT
Start: 2017-11-14

## 2017-11-14 RX ORDER — ACETAMINOPHEN 325 MG/1
650 TABLET ORAL ONCE
Status: CANCELLED | OUTPATIENT
Start: 2017-11-14 | End: 2017-11-14

## 2017-11-14 RX ORDER — MEPERIDINE HYDROCHLORIDE 25 MG/ML
12.5 INJECTION INTRAMUSCULAR; INTRAVENOUS; SUBCUTANEOUS
Status: CANCELLED | OUTPATIENT
Start: 2017-11-14

## 2017-11-14 RX ORDER — ONDANSETRON 2 MG/ML
4 INJECTION INTRAMUSCULAR; INTRAVENOUS EVERY 30 MIN PRN
Status: CANCELLED | OUTPATIENT
Start: 2017-11-14

## 2017-11-14 RX ORDER — PHENYLEPHRINE HYDROCHLORIDE 100 MG/ML
1 SOLUTION/ DROPS OPHTHALMIC ONCE
Status: CANCELLED | OUTPATIENT
Start: 2017-11-14 | End: 2017-11-14

## 2017-11-14 RX ORDER — PHENYLEPHRINE HYDROCHLORIDE 25 MG/ML
1 SOLUTION/ DROPS OPHTHALMIC ONCE
Status: CANCELLED | OUTPATIENT
Start: 2017-11-14 | End: 2017-11-14

## 2017-11-14 RX ORDER — PHENYLEPHRINE HYDROCHLORIDE 100 MG/ML
1 SOLUTION/ DROPS OPHTHALMIC
Status: CANCELLED | OUTPATIENT
Start: 2017-11-14

## 2017-11-14 NOTE — ANESTHESIA PREPROCEDURE EVALUATION
Anesthesia Evaluation     . Pt has had prior anesthetic.     No history of anesthetic complications          ROS/MED HX    ENT/Pulmonary:  - neg pulmonary ROS     Neurologic:  - neg neurologic ROS     Cardiovascular:     (+) Dyslipidemia, ----. : . . . :. .       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      Psychiatric:  - neg psychiatric ROS       Infectious Disease:  - neg infectious disease ROS       Malignancy:      - no malignancy   Other:    - neg other ROS                                Anesthesia Plan      History & Physical Review      ASA Status:  3 .        Plan for MAC with Other induction. Maintenance will be Other.  Reason for MAC:  Deep or markedly invasive procedure (G8), Procedure to face, neck, head or breast and Difficulty with conscious sedation (QS)  PONV prophylaxis:  Ondansetron (or other 5HT-3) and Dexamethasone or Solumedrol       Postoperative Care  Postoperative pain management:  IV analgesics, Oral pain medications and Multi-modal analgesia.      Consents  Anesthetic plan, risks, benefits and alternatives discussed with:  Patient..                          .

## 2017-11-26 ENCOUNTER — HEALTH MAINTENANCE LETTER (OUTPATIENT)
Age: 62
End: 2017-11-26

## 2017-12-31 ENCOUNTER — HEALTH MAINTENANCE LETTER (OUTPATIENT)
Age: 62
End: 2017-12-31

## 2018-03-27 DIAGNOSIS — Z12.31 ENCOUNTER FOR SCREENING MAMMOGRAM FOR BREAST CANCER: Primary | ICD-10-CM

## 2018-03-29 ENCOUNTER — TRANSFERRED RECORDS (OUTPATIENT)
Dept: HEALTH INFORMATION MANAGEMENT | Facility: HOSPITAL | Age: 63
End: 2018-03-29

## 2018-04-02 ENCOUNTER — HOSPITAL ENCOUNTER (OUTPATIENT)
Facility: HOSPITAL | Age: 63
End: 2018-04-02
Attending: OPHTHALMOLOGY | Admitting: OPHTHALMOLOGY
Payer: COMMERCIAL

## 2018-04-04 ENCOUNTER — OFFICE VISIT (OUTPATIENT)
Dept: FAMILY MEDICINE | Facility: OTHER | Age: 63
End: 2018-04-04
Attending: FAMILY MEDICINE
Payer: COMMERCIAL

## 2018-04-04 VITALS
DIASTOLIC BLOOD PRESSURE: 66 MMHG | BODY MASS INDEX: 27.31 KG/M2 | HEART RATE: 74 BPM | TEMPERATURE: 96.5 F | WEIGHT: 160 LBS | OXYGEN SATURATION: 99 % | SYSTOLIC BLOOD PRESSURE: 122 MMHG | HEIGHT: 64 IN

## 2018-04-04 DIAGNOSIS — Z01.818 PREOP GENERAL PHYSICAL EXAM: Primary | ICD-10-CM

## 2018-04-04 DIAGNOSIS — H25.9 AGE-RELATED CATARACT OF BOTH EYES, UNSPECIFIED AGE-RELATED CATARACT TYPE: ICD-10-CM

## 2018-04-04 DIAGNOSIS — J98.9 REACTIVE AIRWAY DISEASE THAT IS NOT ASTHMA: ICD-10-CM

## 2018-04-04 PROCEDURE — 99214 OFFICE O/P EST MOD 30 MIN: CPT | Performed by: FAMILY MEDICINE

## 2018-04-04 RX ORDER — ALBUTEROL SULFATE 90 UG/1
2 AEROSOL, METERED RESPIRATORY (INHALATION) EVERY 6 HOURS PRN
Qty: 1 INHALER | Refills: 1 | COMMUNITY
Start: 2018-04-04 | End: 2018-11-21

## 2018-04-04 ASSESSMENT — ANXIETY QUESTIONNAIRES
6. BECOMING EASILY ANNOYED OR IRRITABLE: NOT AT ALL
GAD7 TOTAL SCORE: 0
5. BEING SO RESTLESS THAT IT IS HARD TO SIT STILL: NOT AT ALL
3. WORRYING TOO MUCH ABOUT DIFFERENT THINGS: NOT AT ALL
7. FEELING AFRAID AS IF SOMETHING AWFUL MIGHT HAPPEN: NOT AT ALL
1. FEELING NERVOUS, ANXIOUS, OR ON EDGE: NOT AT ALL
2. NOT BEING ABLE TO STOP OR CONTROL WORRYING: NOT AT ALL
4. TROUBLE RELAXING: NOT AT ALL

## 2018-04-04 ASSESSMENT — PAIN SCALES - GENERAL: PAINLEVEL: NO PAIN (0)

## 2018-04-04 NOTE — PROGRESS NOTES
[facility] [place]  [address]  [place] [address]  [phone]  Dept: [phone]    PRE-OP EVALUATION:  Today's date: 2018    Katelyn aMnley (: 1955) presents for pre-operative evaluation assessment as requested by Dr. Snow.  She requires evaluation and anesthesia risk assessment prior to undergoing surgery/procedure for treatment of cataract .    Proposed Surgery/ Procedure: left eye cataract then right eye  Date of Surgery/ Procedure: 4/10/18 and 18 cataract removal and lens placement  Time of Surgery/ Procedure: unknown  Hospital/Surgical Facility: [facility] [place] [facility]    Primary Physician: Robert Blakely  Type of Anesthesia Anticipated: to be determined    Patient has a Health Care Directive or Living Will:  YES     1. NO - Do you have a history of heart attack, stroke, stent, bypass or surgery on an artery in the head, neck, heart or legs?  2. NO - Do you ever have any pain or discomfort in your chest?  3. NO - Do you have a history of  Heart Failure?  4. NO - Are you troubled by shortness of breath when: walking on the level, up a slight hill or at night?  5. NO - Do you currently have a cold, bronchitis or other respiratory infection?  6. NO - Do you have a cough, shortness of breath or wheezing?  7. NO - Do you sometimes get pains in the calves of your legs when you walk?  8. NO - Do you or anyone in your family have previous history of blood clots?  9. NO - Do you or does anyone in your family have a serious bleeding problem such as prolonged bleeding following surgeries or cuts?  10. NO - Have you ever had problems with anemia or been told to take iron pills?  11. NO - Have you had any abnormal blood loss such as black, tarry or bloody stools, or abnormal vaginal bleeding?  12. NO - Have you ever had a blood transfusion?  13. NO - Have you or any of your relatives ever had problems with anesthesia?  14. NO - Do you have sleep apnea, excessive snoring or daytime drowsiness?  15. NO - Do  you have any prosthetic heart valves?  16. YES - DO YOU HAVE PROSTHETIC JOINTS? knees  17. NO - Is there any chance that you may be pregnant?      HPI:     HPI related to upcoming procedure: Brief HPI related to upcoming procedure:   61 yO female  presents for cardiopulmonary/general clearance to undergo the above procedure.  His surgeon listed above has asked for this clearance to undergo anesthesia. Pt has had this condition for approximately over a year or so.   Overall pt is of good health and has not had any perioperative complications with previous surgeries.       Pt was supposed to have surgery done last year BUT lenses did not come in and had to be cancelled.       See problem list for active medical problems.  Problems all longstanding and stable, except as noted/documented.  See ROS for pertinent symptoms related to these conditions.                                                                                                                                                          .    MEDICAL HISTORY:     Patient Active Problem List    Diagnosis Date Noted     Status post vaginal hysterectomy 09/12/2016     Priority: Medium     Bso,sling,post repair, [id number]       Dyslipidemia 05/15/2014     Priority: Medium     Lichen sclerosus 09/16/2013     Priority: Medium     Leukoplakia 07/03/2013     Priority: Medium     Pre-diabetes 05/06/2013     Priority: Medium      Past Medical History:   Diagnosis Date     Cyst of Bartholin's gland 10/22/2007     Diverticulitis of sigmoid colon 8/23/2011     Endometrial hyperplasia without atypia, simple      Glucose intolerance (impaired glucose tolerance) 790.22     Hypercholesterolemia 8/23/2011     Menopause 5/6/2013     Osteoarthrosis, unspecified whether generalized or localized, lower leg 7/9/2002     S/P vaginal hysterectomy 2/19/2015     Special screening for malignant neoplasms, colon 10/15/2004     Status post total knee replacement 8/23/2011     Past  Surgical History:   Procedure Laterality Date     COLONOSCOPY  2004     COLONOSCOPY  2-    Dr Sanchez/ sigmoid diverticular dz     COLPORRHAPHY POSTERIOR N/A 2/18/2015    Procedure: COLPORRHAPHY POSTERIOR;  Surgeon: Vasquez Sauer MD;  Location: [facility]     CYSTOSCOPY N/A 2/18/2015    Procedure: CYSTOSCOPY;  Surgeon: Vasquez Sauer MD;  Location: [facility] OR     DILATION AND CURETTAGE, HYSTEROSCOPY DIAGNOSTIC, COMBINED  5/7/2014    Procedure: COMBINED DILATION AND CURETTAGE, HYSTEROSCOPY DIAGNOSTIC;  Surgeon: Vasquez Sauer MD;  Location: [facility] OR     EYE SURGERY Left 08/2016    macular hole repair     HYSTERECTOMY VAGINAL, BILATERAL SALPINGO-OOPHERECTOMY, COMBINED N/A 2/18/2015    Procedure: COMBINED HYSTERECTOMY VAGINAL, SALPINGO-OOPHORECTOMY;  Surgeon: Vasquez Sauer MD;  Location: [facility] OR     JOINT REPLACEMENT  unijoint    right     JOINT REPLACEMENT  full joint    x2 - left     SLING TRANSOBTURATOR N/A 2/18/2015    Procedure: SLING TRANSOBTURATOR;  Surgeon: Vasquez Sauer MD;  Location: [facility] OR     Current Outpatient Prescriptions   Medication Sig Dispense Refill     nystatin-triamcinolone (MYCOLOG II) cream Apply topically daily       GLUCOSAMINE SULFATE PO Take 2,000 mg by mouth daily       Docusate Sodium (COLACE PO) Take 100 mg by mouth daily       Psyllium (METAMUCIL PO)        Multiple Vitamins-Minerals (MULTIVITAMIN OR) Take 1 capsule by mouth daily.       OTC products: None, except as noted above    Allergies   Allergen Reactions     Cats      Cat/feline product derivatives     Dogs      Horse-Derived Products      And cows      Latex Allergy: NO    Social History   Substance Use Topics     Smoking status: Never Smoker     Smokeless tobacco: Never Used     Alcohol use No     History   Drug Use No       REVIEW OF SYSTEMS:   Constitutional, neuro, ENT, endocrine, pulmonary, cardiac, gastrointestinal, genitourinary, musculoskeletal, integument and psychiatric systems are negative, except as otherwise noted.    EXAM:  "  /66  Pulse 74  Temp 96.5  F (35.8  C)  Ht 5' 3.5\" (1.613 m)  Wt 160 lb (72.6 kg)  SpO2 99%  BMI 27.9 kg/m2    GENERAL APPEARANCE: healthy, alert and no distress     EYES: EOMI, PERRL     HENT: ear canals and TM's normal and nose and mouth without ulcers or lesions     NECK: no adenopathy, no asymmetry, masses, or scars and thyroid normal to palpation     RESP: lungs clear to auscultation - no rales, rhonchi or wheezes     CV: regular rates and rhythm, normal S1 S2, no S3 or S4 and no murmur, click or rub     ABDOMEN:  soft, nontender, no HSM or masses and bowel sounds normal     MS: extremities normal- no gross deformities noted, no evidence of inflammation in joints, FROM in all extremities.     SKIN: no suspicious lesions or rashes     NEURO: Normal strength and tone, sensory exam grossly normal, mentation intact and speech normal     PSYCH: mentation appears normal. and affect normal/bright     LYMPHATICS: No cervical adenopathy    DIAGNOSTICS:   EK2017 appears normal, NSR, normal axis, normal intervals, no acute ST/T changes c/w ischemia, no LVH by voltage criteria, unchanged from previous tracings    Recent Labs   Lab Test  17   0853  17   0811  09/15/16   0754  07/15/16   0958   14   0700   HGB  13.3   --   13.0  13.0   < >  12.9   PLT  266   --   259  280   < >  261   INR   --    --    --    --    --   1.11   NA  140   --    --   140   < >  137   POTASSIUM  4.1   --    --   3.9   < >  3.8   CR  0.96   --    --   0.95   < >  0.99   A1C   --   5.6  5.9   --    < >   --     < > = values in this interval not displayed.    NO labs redone- has in 2017    IMPRESSION:   Reason for surgery/procedure: bilateral cataracts   Diagnosis/reason for consult: Cardiopulmonary clearance     The proposed surgical procedure is considered LOW risk.    REVISED CARDIAC RISK INDEX  The patient has the following serious cardiovascular risks for perioperative complications such as (MI, PE, VFib " and 3  AV Block):  No serious cardiac risks  INTERPRETATION: 0 risks: Class I (very low risk - 0.4% complication rate)    The patient has the following additional risks for perioperative complications:  No identified additional risks      ICD-10-CM    1. Preop general physical exam Z01.818    2. Age-related cataract of both eyes, unspecified age-related cataract type H25.9    3. Reactive airway disease that is not asthma R09.89 albuterol (PROAIR HFA/PROVENTIL HFA/VENTOLIN HFA) 108 (90 BASE) MCG/ACT Inhaler       RECOMMENDATIONS:     APPROVAL GIVEN to proceed with proposed procedure, without further diagnostic evaluation       Signed Electronically by: Robert Blakely MD    Copy of this evaluation report is provided to requesting physician.    [facility] Preop Guidelines    Revised Cardiac Risk Index

## 2018-04-04 NOTE — NURSING NOTE
"Chief Complaint   Patient presents with     Pre-Op Exam       Initial /66  Pulse 74  Temp 96.5  F (35.8  C)  Ht 5' 3.5\" (1.613 m)  Wt 160 lb (72.6 kg)  SpO2 99%  BMI 27.9 kg/m2 Estimated body mass index is 27.9 kg/(m^2) as calculated from the following:    Height as of this encounter: 5' 3.5\" (1.613 m).    Weight as of this encounter: 160 lb (72.6 kg).  Medication Reconciliation: complete     Klaus Gillette      "

## 2018-04-04 NOTE — MR AVS SNAPSHOT
After Visit Summary   4/4/2018    Katelyn Manley    MRN: [id number]           Patient Information     Date Of Birth          1955        Visit Information        Provider Department      4/4/2018 3:00 PM Robert Blakely MD [facility]        Today's Diagnoses     Preop general physical exam    -  1    Age-related cataract of both eyes, unspecified age-related cataract type        Reactive airway disease that is not asthma          Care Instructions      Before Your Surgery      Call your surgeon if there is any change in your health. This includes signs of a cold or flu (such as a sore throat, runny nose, cough, rash or fever).    Do not smoke, drink alcohol or take over the counter medicine (unless your surgeon or primary care doctor tells you to) for the 24 hours before and after surgery.    If you take prescribed drugs: Follow your doctor s orders about which medicines to take and which to stop until after surgery.    Eating and drinking prior to surgery: follow the instructions from your surgeon    Take a shower or bath the night before surgery. Use the soap your surgeon gave you to gently clean your skin. If you do not have soap from your surgeon, use your regular soap. Do not shave or scrub the surgery site.  Wear clean pajamas and have clean sheets on your bed.           Follow-ups after your visit        Your next 10 appointments already scheduled     Apr 10, 2018   Procedure with Jerome Snow MD   [facility] ([facility] )    [address] [place] [id number]   [phone]            Sep 14, 2018  8:15 AM CDT   (Arrive by 8:00 AM)   MA SCREENING DIGITAL BILATERAL with HCMA1   [facility] ([facility] )    [address] [facility] [address]   [phone]           Do not use any powder, lotion or deodorant under your arms or on your breast. If you do, we will ask you to remove it before your exam.  " Wear comfortable, two-piece clothing.  If you have any allergies, tell your care team.  Bring any previous mammograms from other facilities or have them mailed to the breast center. Three-dimensional (3D) mammograms are available at [facility] locations in [facility], [facility], [facility], [place], [place], and [place]. [facility] locations include [place] and [facility] in [place]. Benefits of 3D mammograms include: - Improved rate of cancer detection - Decreases your chance of having to go back for more tests, which means fewer: - \"False-positive\" results (This means that there is an abnormal area but it isn't cancer.) - Invasive testing procedures, such as a biopsy or surgery - Can provide clearer images of the breast if you have dense breast tissue. 3D mammography is an optional exam that anyone can have with a 2D mammogram. It doesn't replace or take the place of a 2D mammogram. 2D mammograms remain an effective screening test for all women.  Not all insurance companies cover the cost of a 3D mammogram. Check with your insurance.            Sep 28, 2018 10:00 AM CDT   (Arrive by 9:45 AM)   PHYSICAL with Robert Blakely MD   [facility] ([facility] )    [address] [place] [address] Ave  [place] [address]   [phone]              Who to contact     If you have questions or need follow up information about today's clinic visit or your schedule please contact [facility] directly at [phone].  Normal or non-critical lab and imaging results will be communicated to you by MyChart, letter or phone within 4 business days after the clinic has received the results. If you do not hear from us within 7 days, please contact the clinic through MyChart or phone. If you have a critical or abnormal lab result, we will notify you by phone as soon as possible.  Submit refill requests through [employer] or call your pharmacy and they will forward the " "refill request to us. Please allow 3 business days for your refill to be completed.          Additional Information About Your Visit        MyChart Information     THEMA lets you send messages to your doctor, view your test results, renew your prescriptions, schedule appointments and more. To sign up, go to [email or web address] . Click on \"Log in\" on the left side of the screen, which will take you to the Welcome page. Then click on \"Sign up Now\" on the right side of the page.     You will be asked to enter the access code listed below, as well as some personal information. Please follow the directions to create your username and password.     Your access code is: [email or web address]  Expires: 7/3/2018  3:29 PM     Your access code will  in 90 days. If you need help or a new code, please call your [place] clinic or [phone].        Care EveryWhere ID     This is your Care EveryWhere ID. This could be used by other organizations to access your [place] medical records  [id number]        Your Vitals Were     Pulse Temperature Height Pulse Oximetry BMI (Body Mass Index)       74 96.5  F (35.8  C) 5' 3.5\" (1.613 m) 99% 27.9 kg/m2        Blood Pressure from Last 3 Encounters:   18 122/66   17 128/78   10/06/17 112/70    Weight from Last 3 Encounters:   18 160 lb (72.6 kg)   17 158 lb (71.7 kg)   10/06/17 155 lb (70.3 kg)              Today, you had the following     No orders found for display       Primary Care Provider Office Phone # Fax #    Robert Blakely [phone]       [address] [place], [place] [id number]        Equal Access to Services     [facility] : Trena [place], doron howell, shyla kasascha campos. So [facility] [phone].    ATENCIÓN: Si habla español, tiene a bennett disposición servicios gratuitos de asistencia lingüística. Sakina mariee [phone].    We comply with applicable federal civil " rights laws and [place] laws. We do not discriminate on the basis of race, color, national origin, age, disability, sex, sexual orientation, or gender identity.            Thank you!     Thank you for choosing [facility]  for your care. Our goal is always to provide you with excellent care. Hearing back from our patients is one way we can continue to improve our services. Please take a few minutes to complete the written survey that you may receive in the mail after your visit with us. Thank you!             Your Updated Medication List - Protect others around you: Learn how to safely use, store and throw away your medicines at www.disposemymeds.org.          This list is accurate as of 4/4/18  3:29 PM.  Always use your most recent med list.                   Brand Name Dispense Instructions for use Diagnosis    albuterol 108 (90 BASE) MCG/ACT Inhaler    PROAIR HFA/PROVENTIL HFA/VENTOLIN HFA    1 Inhaler    Inhale 2 puffs into the lungs every 6 hours as needed for shortness of breath / dyspnea or wheezing    Reactive airway disease that is not asthma       COLACE PO      Take 100 mg by mouth daily        GLUCOSAMINE SULFATE PO      Take 2,000 mg by mouth daily        METAMUCIL PO           MULTIVITAMIN PO      Take 1 capsule by mouth daily.        nystatin-triamcinolone cream    MYCOLOG II     Apply topically daily

## 2018-04-05 ASSESSMENT — PATIENT HEALTH QUESTIONNAIRE - PHQ9: SUM OF ALL RESPONSES TO PHQ QUESTIONS 1-9: 0

## 2018-04-05 ASSESSMENT — ANXIETY QUESTIONNAIRES: GAD7 TOTAL SCORE: 0

## 2018-04-05 NOTE — H&P (VIEW-ONLY)
[facility] [place]  [address]  [place] [address]  [phone]  Dept: [phone]    PRE-OP EVALUATION:  Today's date: 2018    Katelyn Manley (: 1955) presents for pre-operative evaluation assessment as requested by Dr. Snow.  She requires evaluation and anesthesia risk assessment prior to undergoing surgery/procedure for treatment of cataract .    Proposed Surgery/ Procedure: left eye cataract then right eye  Date of Surgery/ Procedure: 4/10/18 and 18 cataract removal and lens placement  Time of Surgery/ Procedure: unknown  [place]ospital/Surgical [place] [facility]    Primary Physician: Robert Blakely  Type of Anesthesia Anticipated: to be determined    Patient has a Health Care Directive or Living Will:  YES     1. NO - Do you have a history of heart attack, stroke, stent, bypass or surgery on an artery in the head, neck, heart or legs?  2. NO - Do you ever have any pain or discomfort in your chest?  3. NO - Do you have a history of  Heart Failure?  4. NO - Are you troubled by shortness of breath when: walking on the level, up a slight hill or at night?  5. NO - Do you currently have a cold, bronchitis or other respiratory infection?  6. NO - Do you have a cough, shortness of breath or wheezing?  7. NO - Do you sometimes get pains in the calves of your legs when you walk?  8. NO - Do you or anyone in your family have previous history of blood clots?  9. NO - Do you or does anyone in your family have a serious bleeding problem such as prolonged bleeding following surgeries or cuts?  10. NO - Have you ever had problems with anemia or been told to take iron pills?  11. NO - Have you had any abnormal blood loss such as black, tarry or bloody stools, or abnormal vaginal bleeding?  12. NO - Have you ever had a blood transfusion?  13. NO - Have you or any of your relatives ever had problems with anesthesia?  14. NO - Do you have sleep apnea, excessive snoring or daytime drowsiness?  15. NO - Do  you have any prosthetic heart valves?  16. YES - DO YOU HAVE PROSTHETIC JOINTS? knees  17. NO - Is there any chance that you may be pregnant?      HPI:     HPI related to upcoming procedure: Brief HPI related to upcoming procedure:   61 yO female  presents for cardiopulmonary/general clearance to undergo the above procedure.  His surgeon listed above has asked for this clearance to undergo anesthesia. Pt has had this condition for approximately over a year or so.   Overall pt is of good health and has not had any perioperative complications with previous surgeries.       Pt was supposed to have surgery done last year BUT lenses did not come in and had to be cancelled.       See problem list for active medical problems.  Problems all longstanding and stable, except as noted/documented.  See ROS for pertinent symptoms related to these conditions.                                                                                                                                                          .    MEDICAL HISTORY:     Patient Active Problem List    Diagnosis Date Noted     Status post vaginal hysterectomy 09/12/2016     Priority: Medium     Bso,sling,post repair, [id number]       Dyslipidemia 05/15/2014     Priority: Medium     Lichen sclerosus 09/16/2013     Priority: Medium     Leukoplakia 07/03/2013     Priority: Medium     Pre-diabetes 05/06/2013     Priority: Medium      Past Medical History:   Diagnosis Date     Cyst of Bartholin's gland 10/22/2007     Diverticulitis of sigmoid colon 8/23/2011     Endometrial hyperplasia without atypia, simple      Glucose intolerance (impaired glucose tolerance) 790.22     Hypercholesterolemia 8/23/2011     Menopause 5/6/2013     Osteoarthrosis, unspecified whether generalized or localized, lower leg 7/9/2002     S/P vaginal hysterectomy 2/19/2015     Special screening for malignant neoplasms, colon 10/15/2004     Status post total knee replacement 8/23/2011     Past  Surgical History:   Procedure Laterality Date     COLONOSCOPY  2004     COLONOSCOPY  2-    Dr Sanchez/ sigmoid diverticular dz     COLPORRHAPHY POSTERIOR N/A 2/18/2015    Procedure: COLPORRHAPHY POSTERIOR;  Surgeon: Vasquez Sauer MD;  Location: [facility]     CYSTOSCOPY N/A 2/18/2015    Procedure: CYSTOSCOPY;  Surgeon: Vasquez Sauer MD;  Location: [facility]     DILATION AND CURETTAGE, HYSTEROSCOPY DIAGNOSTIC, COMBINED  5/7/2014    Procedure: COMBINED DILATION AND CURETTAGE, HYSTEROSCOPY DIAGNOSTIC;  Surgeon: Vasquez Sauer MD;  Location: [facility] OR     EYE SURGERY Left 08/2016    macular hole repair     HYSTERECTOMY VAGINAL, BILATERAL SALPINGO-OOPHERECTOMY, COMBINED N/A 2/18/2015    Procedure: COMBINED HYSTERECTOMY VAGINAL, SALPINGO-OOPHORECTOMY;  Surgeon: Vasquez Sauer MD;  Location: [facility] OR     JOINT REPLACEMENT  unijoint    right     JOINT REPLACEMENT  full joint    x2 - left     SLING TRANSOBTURATOR N/A 2/18/2015    Procedure: SLING TRANSOBTURATOR;  Surgeon: Vasquez Sauer MD;  Location: [facility]     Current Outpatient Prescriptions   Medication Sig Dispense Refill     nystatin-triamcinolone (MYCOLOG II) cream Apply topically daily       GLUCOSAMINE SULFATE PO Take 2,000 mg by mouth daily       Docusate Sodium (COLACE PO) Take 100 mg by mouth daily       Psyllium (METAMUCIL PO)        Multiple Vitamins-Minerals (MULTIVITAMIN OR) Take 1 capsule by mouth daily.       OTC products: None, except as noted above    Allergies   Allergen Reactions     Cats      Cat/feline product derivatives     Dogs      Horse-Derived Products      And cows      Latex Allergy: NO    Social History   Substance Use Topics     Smoking status: Never Smoker     Smokeless tobacco: Never Used     Alcohol use No     History   Drug Use No       REVIEW OF SYSTEMS:   Constitutional, neuro, ENT, endocrine, pulmonary, cardiac, gastrointestinal, genitourinary, musculoskeletal, integument and psychiatric systems are negative, except as otherwise noted.    EXAM:  "  /66  Pulse 74  Temp 96.5  F (35.8  C)  Ht 5' 3.5\" (1.613 m)  Wt 160 lb (72.6 kg)  SpO2 99%  BMI 27.9 kg/m2    GENERAL APPEARANCE: healthy, alert and no distress     EYES: EOMI, PERRL     HENT: ear canals and TM's normal and nose and mouth without ulcers or lesions     NECK: no adenopathy, no asymmetry, masses, or scars and thyroid normal to palpation     RESP: lungs clear to auscultation - no rales, rhonchi or wheezes     CV: regular rates and rhythm, normal S1 S2, no S3 or S4 and no murmur, click or rub     ABDOMEN:  soft, nontender, no HSM or masses and bowel sounds normal     MS: extremities normal- no gross deformities noted, no evidence of inflammation in joints, FROM in all extremities.     SKIN: no suspicious lesions or rashes     NEURO: Normal strength and tone, sensory exam grossly normal, mentation intact and speech normal     PSYCH: mentation appears normal. and affect normal/bright     LYMPHATICS: No cervical adenopathy    DIAGNOSTICS:   EK2017 appears normal, NSR, normal axis, normal intervals, no acute ST/T changes c/w ischemia, no LVH by voltage criteria, unchanged from previous tracings    Recent Labs   Lab Test  17   0853  17   0811  09/15/16   0754  07/15/16   0958   14   0700   HGB  13.3   --   13.0  13.0   < >  12.9   PLT  266   --   259  280   < >  261   INR   --    --    --    --    --   1.11   NA  140   --    --   140   < >  137   POTASSIUM  4.1   --    --   3.9   < >  3.8   CR  0.96   --    --   0.95   < >  0.99   A1C   --   5.6  5.9   --    < >   --     < > = values in this interval not displayed.    NO labs redone- has in 2017    IMPRESSION:   Reason for surgery/procedure: bilateral cataracts   Diagnosis/reason for consult: Cardiopulmonary clearance     The proposed surgical procedure is considered LOW risk.    REVISED CARDIAC RISK INDEX  The patient has the following serious cardiovascular risks for perioperative complications such as (MI, PE, VFib " and 3  AV Block):  No serious cardiac risks  INTERPRETATION: 0 risks: Class I (very low risk - 0.4% complication rate)    The patient has the following additional risks for perioperative complications:  No identified additional risks      ICD-10-CM    1. Preop general physical exam Z01.818    2. Age-related cataract of both eyes, unspecified age-related cataract type H25.9    3. Reactive airway disease that is not asthma R09.89 albuterol (PROAIR HFA/PROVENTIL HFA/VENTOLIN HFA) 108 (90 BASE) MCG/ACT Inhaler       RECOMMENDATIONS:     APPROVAL GIVEN to proceed with proposed procedure, without further diagnostic evaluation       Signed Electronically by: Robert Blakely MD    Copy of this evaluation report is provided to requesting physician.    [facility] Preop Guidelines    Revised Cardiac Risk Index

## 2018-04-13 ENCOUNTER — OFFICE VISIT (OUTPATIENT)
Dept: FAMILY MEDICINE | Facility: OTHER | Age: 63
End: 2018-04-13
Attending: FAMILY MEDICINE
Payer: COMMERCIAL

## 2018-04-13 VITALS
DIASTOLIC BLOOD PRESSURE: 64 MMHG | OXYGEN SATURATION: 99 % | WEIGHT: 158 LBS | BODY MASS INDEX: 27.55 KG/M2 | TEMPERATURE: 97.4 F | SYSTOLIC BLOOD PRESSURE: 120 MMHG | HEART RATE: 88 BPM

## 2018-04-13 DIAGNOSIS — J06.9 UPPER RESPIRATORY TRACT INFECTION, UNSPECIFIED TYPE: Primary | ICD-10-CM

## 2018-04-13 PROCEDURE — 99213 OFFICE O/P EST LOW 20 MIN: CPT | Performed by: FAMILY MEDICINE

## 2018-04-13 RX ORDER — AZITHROMYCIN 250 MG/1
TABLET, FILM COATED ORAL
Qty: 6 TABLET | Refills: 0 | Status: SHIPPED | OUTPATIENT
Start: 2018-04-13 | End: 2018-07-03

## 2018-04-13 ASSESSMENT — PAIN SCALES - GENERAL: PAINLEVEL: MODERATE PAIN (4)

## 2018-04-13 NOTE — MR AVS SNAPSHOT
"              After Visit Summary   4/13/2018    Katelyn Manley    MRN: [id number]           Patient Information     Date Of Birth          1955        Visit Information        Provider Department      4/13/2018 2:00 PM Robert Blakely MD [facility]        Today's Diagnoses     Upper respiratory tract infection, unspecified type    -  1       Follow-ups after your visit        Your next 10 appointments already scheduled     Apr 24, 2018   Procedure with Jerome Snow MD   [facility] ([facility] )    [address]  [facility] [id number] [phone]            Sep 14, 2018  8:15 AM CDT   (Arrive by 8:00 AM)   MA SCREENING DIGITAL BILATERAL with HCMA1   [facility] Mammography ([facility] )    [address]  [facility] [address]   [phone]           Do not use any powder, lotion or deodorant under your arms or on your breast. If you do, we will ask you to remove it before your exam.  Wear comfortable, two-piece clothing.  If you have any allergies, tell your care team.  Bring any previous mammograms from other facilities or have them mailed to the breast center. Three-dimensional (3D) mammograms are available at [facility] locations in [facility], [place], [place], [facility], [place], [place], and [place]. [facility] locations include [place] [facility] in [place]. Benefits of 3D mammograms include: - Improved rate of cancer detection - Decreases your chance of having to go back for more tests, which means fewer: - \"False-positive\" results (This means that there is an abnormal area but it isn't cancer.) - Invasive testing procedures, such as a biopsy or surgery - Can provide clearer images of the breast if you have dense breast tissue. 3D mammography is an optional exam that anyone can have with a 2D mammogram. It doesn't replace or take the place of a 2D mammogram. 2D mammograms remain an " "effective screening test for all women.  Not all insurance companies cover the cost of a 3D mammogram. Check with your insurance.            Sep 28, 2018 10:00 AM CDT   (Arrive by 9:45 AM)   PHYSICAL with Robert Blakely MD   [facility] ([facility] )    [place] [address]   [phone]              Who to contact     If you have questions or need follow up information about today's clinic visit or your schedule please contact [facility] directly at [phone].  Normal or non-critical lab and imaging results will be communicated to you by [employer], letter or phone within 4 business days after the clinic has received the results. If you do not hear from us within 7 days, please contact the clinic through [employer] or phone. If you have a critical or abnormal lab result, we will notify you by phone as soon as possible.  Submit refill requests through [employer] or call your pharmacy and they will forward the refill request to us. Please allow 3 business days for your refill to be completed.          Additional Information About Your Visit        MyChart Information     [employer] lets you send messages to your doctor, view your test results, renew your prescriptions, schedule appointments and more. To sign up, go to [email or web address] . Click on \"Log in\" on the left side of the screen, which will take you to the Welcome page. Then click on \"Sign up Now\" on the right side of the page.     You will be asked to enter the access code listed below, as well as some personal information. Please follow the directions to create your username and password.     Your access code is: [email or web address]  Expires: 7/3/2018  3:29 PM     Your access code will  in 90 days. If you need help or a new code, please call your [facility] or [phone].        Care EveryWhere ID     This is your Care EveryWhere ID. This could be used by other organizations to access your " [facility] medical records  [id number]        Your Vitals Were     Pulse Temperature Pulse Oximetry Breastfeeding? BMI (Body Mass Index)       88 97.4  F (36.3  C) (Tympanic) 99% No 27.55 kg/m2        Blood Pressure from Last 3 Encounters:   04/13/18 120/64   04/04/18 122/66   11/08/17 128/78    Weight from Last 3 Encounters:   04/13/18 158 lb (71.7 kg)   04/04/18 160 lb (72.6 kg)   11/08/17 158 lb (71.7 kg)              Today, you had the following     No orders found for display         Today's Medication Changes          These changes are accurate as of 4/13/18  2:09 PM.  If you have any questions, ask your nurse or doctor.               Start taking these medicines.        Dose/Directions    azithromycin 250 MG tablet   Commonly known as:  ZITHROMAX   Used for:  Upper respiratory tract infection, unspecified type   Started by:  Robert Blakely MD        As directed   Quantity:  6 tablet   Refills:  0            Where to get your medicines      These medications were sent to [facility] - [address] [address]     Phone:  [phone]     azithromycin 250 MG tablet                Primary Care Provider Office Phone # Fax #    Robert Blakely [phone]       [address] [place] [id number]        Equal Access to Services     [facility] AH: Hadii lamont medina hadasho Soomaali, waaxda luqadaha, qaybta kaalmada sascha bentley . So [facility] [phone].    ATENCIÓN: Si habla español, tiene a bennett disposición servicios gratuitos de asistencia lingüística. Sakina al [phone].    We comply with applicable federal civil rights laws and [place] laws. We do not discriminate on the basis of race, color, national origin, age, disability, sex, sexual orientation, or gender identity.            Thank you!     Thank you for choosing [facility]  for your care. Our goal is always to provide you with  excellent care. Hearing back from our patients is one way we can continue to improve our services. Please take a few minutes to complete the written survey that you may receive in the mail after your visit with us. Thank you!             Your Updated Medication List - Protect others around you: Learn how to safely use, store and throw away your medicines at www.disposemymeds.org.          This list is accurate as of 4/13/18  2:09 PM.  Always use your most recent med list.                   Brand Name Dispense Instructions for use Diagnosis    albuterol 108 (90 Base) MCG/ACT Inhaler    PROAIR HFA/PROVENTIL HFA/VENTOLIN HFA    1 Inhaler    Inhale 2 puffs into the lungs every 6 hours as needed for shortness of breath / dyspnea or wheezing    Reactive airway disease that is not asthma       azithromycin 250 MG tablet    ZITHROMAX    6 tablet    As directed    Upper respiratory tract infection, unspecified type       COLACE PO      Take 100 mg by mouth daily        GLUCOSAMINE SULFATE PO      Take 2,000 mg by mouth daily        METAMUCIL PO           MULTIVITAMIN PO      Take 1 capsule by mouth daily.        nystatin-triamcinolone cream    MYCOLOG II     Apply topically daily

## 2018-04-13 NOTE — NURSING NOTE
"Chief Complaint   Patient presents with     URI       Initial /64 (BP Location: Left arm, Patient Position: Chair, Cuff Size: Adult Regular)  Pulse 88  Temp 97.4  F (36.3  C) (Tympanic)  Wt 158 lb (71.7 kg)  SpO2 99%  Breastfeeding? No  BMI 27.55 kg/m2 Estimated body mass index is 27.55 kg/(m^2) as calculated from the following:    Height as of 4/4/18: 5' 3.5\" (1.613 m).    Weight as of this encounter: 158 lb (71.7 kg).  Medication Reconciliation: complete   Taty Morfin CMA(AAMA)   "

## 2018-04-13 NOTE — PROGRESS NOTES
SUBJECTIVE:   Katelyn Manley is a 63 year old female who presents to clinic today for the following health issues:        RESPIRATORY SYMPTOMS      Duration: 1 week    Description  sore throat, cough, headache, fatigue/malaise and neck pain     Severity: mild    Accompanying signs and symptoms: None    History (predisposing factors):  none    Precipitating or alleviating factors: None    Therapies tried and outcome:  rest and fluids zycam and zinc     persistent ST and not feeling well/    No fever     Has surgery coming up               Problem list and histories reviewed & adjusted, as indicated.  Additional history: as documented    Labs reviewed in EPIC    Reviewed and updated as needed this visit by clinical staff  Allergies  Meds       Reviewed and updated as needed this visit by Provider         ROS:  CONSTITUTIONAL: NEGATIVE for fever, chills, change in weight  RESP: NEGATIVE for significant cough or SOB  CV: NEGATIVE for chest pain, palpitations or peripheral edema    OBJECTIVE:                                                    /64 (BP Location: Left arm, Patient Position: Chair, Cuff Size: Adult Regular)  Pulse 88  Temp 97.4  F (36.3  C) (Tympanic)  Wt 158 lb (71.7 kg)  SpO2 99%  Breastfeeding? No  BMI 27.55 kg/m2  Body mass index is 27.55 kg/(m^2).   GENERAL: healthy, alert, well nourished, well hydrated, no distress  HENT: ear canals- normal; TMs- normal; Nose- normal; Mouth- no ulcers, no lesions  NECK: no tenderness, no adenopathy, no asymmetry, no masses, no stiffness; thyroid- normal to palpation  RESP: lungs clear to auscultation - no rales, no rhonchi, no wheezes         ASSESSMENT/PLAN:                                                      (J06.9) Upper respiratory tract infection, unspecified type  (primary encounter diagnosis)  Comment: discussed. Concerned about upcoming surgery   Plan: Will treat empirically  Zpack Claritin..Symptomatic treatment was discussed along when  patient should call and/or come back into the clinic or go to ER/Urgent care. All questions answered.           See Patient Instructions    Robert Blakely MD  [facility]

## 2018-04-16 NOTE — H&P (VIEW-ONLY)
[facility] [place]  [address]  [place] [address]  [phone]  Dept: [phone]    PRE-OP EVALUATION:  Today's date: 2018    Katelyn Manley (: 1955) presents for pre-operative evaluation assessment as requested by Dr. Snow.  She requires evaluation and anesthesia risk assessment prior to undergoing surgery/procedure for treatment of cataract .    Proposed Surgery/ Procedure: left eye cataract then right eye  Date of Surgery/ Procedure: 4/10/18 and 18 cataract removal and lens placement  Time of Surgery/ Procedure: unknown  [place] [facility] [place]    Primary Physician: Robert Blakely  Type of Anesthesia Anticipated: to be determined    Patient has a Health Care Directive or Living Will:  YES     1. NO - Do you have a history of heart attack, stroke, stent, bypass or surgery on an artery in the head, neck, heart or legs?  2. NO - Do you ever have any pain or discomfort in your chest?  3. NO - Do you have a history of  Heart Failure?  4. NO - Are you troubled by shortness of breath when: walking on the level, up a slight hill or at night?  5. NO - Do you currently have a cold, bronchitis or other respiratory infection?  6. NO - Do you have a cough, shortness of breath or wheezing?  7. NO - Do you sometimes get pains in the calves of your legs when you walk?  8. NO - Do you or anyone in your family have previous history of blood clots?  9. NO - Do you or does anyone in your family have a serious bleeding problem such as prolonged bleeding following surgeries or cuts?  10. NO - Have you ever had problems with anemia or been told to take iron pills?  11. NO - Have you had any abnormal blood loss such as black, tarry or bloody stools, or abnormal vaginal bleeding?  12. NO - Have you ever had a blood transfusion?  13. NO - Have you or any of your relatives ever had problems with anesthesia?  14. NO - Do you have sleep apnea, excessive snoring or daytime drowsiness?  15. NO - Do  you have any prosthetic heart valves?  16. YES - DO YOU HAVE PROSTHETIC JOINTS? knees  17. NO - Is there any chance that you may be pregnant?      HPI:     HPI related to upcoming procedure: Brief HPI related to upcoming procedure:   63 yO female  presents for cardiopulmonary/general clearance to undergo the above procedure.  His surgeon listed above has asked for this clearance to undergo anesthesia. Pt has had this condition for approximately over a year or so.   Overall pt is of good health and has not had any perioperative complications with previous surgeries.       Pt was supposed to have surgery done last year BUT lenses did not come in and had to be cancelled.       See problem list for active medical problems.  Problems all longstanding and stable, except as noted/documented.  See ROS for pertinent symptoms related to these conditions.                                                                                                                                                          .    MEDICAL HISTORY:     Patient Active Problem List    Diagnosis Date Noted     Status post vaginal hysterectomy 09/12/2016     Priority: Medium     Bso,sling,post repair, [id number]       Dyslipidemia 05/15/2014     Priority: Medium     Lichen sclerosus 09/16/2013     Priority: Medium     Leukoplakia 07/03/2013     Priority: Medium     Pre-diabetes 05/06/2013     Priority: Medium      Past Medical History:   Diagnosis Date     Cyst of Bartholin's gland 10/22/2007     Diverticulitis of sigmoid colon 8/23/2011     Endometrial hyperplasia without atypia, simple      Glucose intolerance (impaired glucose tolerance) 790.22     Hypercholesterolemia 8/23/2011     Menopause 5/6/2013     Osteoarthrosis, unspecified whether generalized or localized, lower leg 7/9/2002     S/P vaginal hysterectomy 2/19/2015     Special screening for malignant neoplasms, colon 10/15/2004     Status post total knee replacement 8/23/2011     Past  Surgical History:   Procedure Laterality Date     COLONOSCOPY  2004     COLONOSCOPY  2-    Dr Sanchez/ sigmoid diverticular dz     COLPORRHAPHY POSTERIOR N/A 2/18/2015    Procedure: COLPORRHAPHY POSTERIOR;  Surgeon: Vasquez Sauer MD;  Location: [facility]     CYSTOSCOPY N/A 2/18/2015    Procedure: CYSTOSCOPY;  Surgeon: Vasquez Sauer MD;  Location: [facility] OR     DILATION AND CURETTAGE, HYSTEROSCOPY DIAGNOSTIC, COMBINED  5/7/2014    Procedure: COMBINED DILATION AND CURETTAGE, HYSTEROSCOPY DIAGNOSTIC;  Surgeon: Vasquez Sauer MD;  Location: [facility] OR     EYE SURGERY Left 08/2016    macular hole repair     HYSTERECTOMY VAGINAL, BILATERAL SALPINGO-OOPHERECTOMY, COMBINED N/A 2/18/2015    Procedure: COMBINED HYSTERECTOMY VAGINAL, SALPINGO-OOPHORECTOMY;  Surgeon: Vasquez Sauer MD;  Location: [facility] OR     JOINT REPLACEMENT  unijoint    right     JOINT REPLACEMENT  full joint    x2 - left     SLING TRANSOBTURATOR N/A 2/18/2015    Procedure: SLING TRANSOBTURATOR;  Surgeon: Vasquez Sauer MD;  Location: [facility] OR     Current Outpatient Prescriptions   Medication Sig Dispense Refill     nystatin-triamcinolone (MYCOLOG II) cream Apply topically daily       GLUCOSAMINE SULFATE PO Take 2,000 mg by mouth daily       Docusate Sodium (COLACE PO) Take 100 mg by mouth daily       Psyllium (METAMUCIL PO)        Multiple Vitamins-Minerals (MULTIVITAMIN OR) Take 1 capsule by mouth daily.       OTC products: None, except as noted above    Allergies   Allergen Reactions     Cats      Cat/feline product derivatives     Dogs      Horse-Derived Products      And cows      Latex Allergy: NO    Social History   Substance Use Topics     Smoking status: Never Smoker     Smokeless tobacco: Never Used     Alcohol use No     History   Drug Use No       REVIEW OF SYSTEMS:   Constitutional, neuro, ENT, endocrine, pulmonary, cardiac, gastrointestinal, genitourinary, musculoskeletal, integument and psychiatric systems are negative, except as otherwise noted.    EXAM:  "  /66  Pulse 74  Temp 96.5  F (35.8  C)  Ht 5' 3.5\" (1.613 m)  Wt 160 lb (72.6 kg)  SpO2 99%  BMI 27.9 kg/m2    GENERAL APPEARANCE: healthy, alert and no distress     EYES: EOMI, PERRL     HENT: ear canals and TM's normal and nose and mouth without ulcers or lesions     NECK: no adenopathy, no asymmetry, masses, or scars and thyroid normal to palpation     RESP: lungs clear to auscultation - no rales, rhonchi or wheezes     CV: regular rates and rhythm, normal S1 S2, no S3 or S4 and no murmur, click or rub     ABDOMEN:  soft, nontender, no HSM or masses and bowel sounds normal     MS: extremities normal- no gross deformities noted, no evidence of inflammation in joints, FROM in all extremities.     SKIN: no suspicious lesions or rashes     NEURO: Normal strength and tone, sensory exam grossly normal, mentation intact and speech normal     PSYCH: mentation appears normal. and affect normal/bright     LYMPHATICS: No cervical adenopathy    DIAGNOSTICS:   EK2017 appears normal, NSR, normal axis, normal intervals, no acute ST/T changes c/w ischemia, no LVH by voltage criteria, unchanged from previous tracings    Recent Labs   Lab Test  17   0853  17   0811  09/15/16   0754  07/15/16   0958   14   0700   HGB  13.3   --   13.0  13.0   < >  12.9   PLT  266   --   259  280   < >  261   INR   --    --    --    --    --   1.11   NA  140   --    --   140   < >  137   POTASSIUM  4.1   --    --   3.9   < >  3.8   CR  0.96   --    --   0.95   < >  0.99   A1C   --   5.6  5.9   --    < >   --     < > = values in this interval not displayed.    NO labs redone- has in 2017    IMPRESSION:   Reason for surgery/procedure: bilateral cataracts   Diagnosis/reason for consult: Cardiopulmonary clearance     The proposed surgical procedure is considered LOW risk.    REVISED CARDIAC RISK INDEX  The patient has the following serious cardiovascular risks for perioperative complications such as (MI, PE, VFib " and 3  AV Block):  No serious cardiac risks  INTERPRETATION: 0 risks: Class I (very low risk - 0.4% complication rate)    The patient has the following additional risks for perioperative complications:  No identified additional risks      ICD-10-CM    1. Preop general physical exam Z01.818    2. Age-related cataract of both eyes, unspecified age-related cataract type H25.9    3. Reactive airway disease that is not asthma R09.89 albuterol (PROAIR HFA/PROVENTIL HFA/VENTOLIN HFA) 108 (90 BASE) MCG/ACT Inhaler       RECOMMENDATIONS:     APPROVAL GIVEN to proceed with proposed procedure, without further diagnostic evaluation       Signed Electronically by: Robert Blakely MD    Copy of this evaluation report is provided to requesting physician.    [place] Preop Guidelines    Revised Cardiac Risk Index

## 2018-04-24 ENCOUNTER — ANESTHESIA EVENT (OUTPATIENT)
Dept: SURGERY | Facility: HOSPITAL | Age: 63
End: 2018-04-24
Payer: COMMERCIAL

## 2018-04-24 ENCOUNTER — SURGERY (OUTPATIENT)
Age: 63
End: 2018-04-24

## 2018-04-24 ENCOUNTER — HOSPITAL ENCOUNTER (OUTPATIENT)
Facility: HOSPITAL | Age: 63
Discharge: HOME | End: 2018-04-24
Attending: OPHTHALMOLOGY | Admitting: OPHTHALMOLOGY
Payer: COMMERCIAL

## 2018-04-24 ENCOUNTER — ANESTHESIA (OUTPATIENT)
Dept: SURGERY | Facility: HOSPITAL | Age: 63
End: 2018-04-24
Payer: COMMERCIAL

## 2018-04-24 VITALS
RESPIRATION RATE: 18 BRPM | OXYGEN SATURATION: 97 % | TEMPERATURE: 97 F | DIASTOLIC BLOOD PRESSURE: 82 MMHG | SYSTOLIC BLOOD PRESSURE: 111 MMHG | WEIGHT: 158 LBS | BODY MASS INDEX: 26.98 KG/M2 | HEIGHT: 64 IN

## 2018-04-24 PROCEDURE — 37000008 ZZH ANESTHESIA TECHNICAL FEE, 1ST 30 MIN: Performed by: OPHTHALMOLOGY

## 2018-04-24 PROCEDURE — 01999 UNLISTED ANES PROCEDURE: CPT | Performed by: NURSE ANESTHETIST, CERTIFIED REGISTERED

## 2018-04-24 PROCEDURE — V2787 ASTIGMATISM-CORRECT FUNCTION: HCPCS | Performed by: OPHTHALMOLOGY

## 2018-04-24 PROCEDURE — 40000305 ZZH STATISTIC PRE PROC ASSESS I: Performed by: OPHTHALMOLOGY

## 2018-04-24 PROCEDURE — 71000027 ZZH RECOVERY PHASE 2 EACH 15 MINS: Performed by: OPHTHALMOLOGY

## 2018-04-24 PROCEDURE — 36000058 ZZH SURGERY LEVEL 3 EA 15 ADDTL MIN: Performed by: OPHTHALMOLOGY

## 2018-04-24 PROCEDURE — 25000128 H RX IP 250 OP 636: Performed by: NURSE ANESTHETIST, CERTIFIED REGISTERED

## 2018-04-24 PROCEDURE — 25000128 H RX IP 250 OP 636: Performed by: OPHTHALMOLOGY

## 2018-04-24 PROCEDURE — 25000125 ZZHC RX 250: Performed by: OPHTHALMOLOGY

## 2018-04-24 PROCEDURE — 27210794 ZZH OR GENERAL SUPPLY STERILE: Performed by: OPHTHALMOLOGY

## 2018-04-24 PROCEDURE — 36000056 ZZH SURGERY LEVEL 3 1ST 30 MIN: Performed by: OPHTHALMOLOGY

## 2018-04-24 PROCEDURE — 25000132 ZZH RX MED GY IP 250 OP 250 PS 637: Performed by: OPHTHALMOLOGY

## 2018-04-24 DEVICE — IMPLANTABLE DEVICE: Type: IMPLANTABLE DEVICE | Site: EYE | Status: FUNCTIONAL

## 2018-04-24 RX ORDER — LIDOCAINE 40 MG/G
CREAM TOPICAL
Status: DISCONTINUED | OUTPATIENT
Start: 2018-04-24 | End: 2018-04-24 | Stop reason: HOSPADM

## 2018-04-24 RX ORDER — ACETAMINOPHEN 325 MG/1
650 TABLET ORAL
Status: COMPLETED | OUTPATIENT
Start: 2018-04-24 | End: 2018-04-24

## 2018-04-24 RX ORDER — FENTANYL CITRATE 50 UG/ML
25-50 INJECTION, SOLUTION INTRAMUSCULAR; INTRAVENOUS
Status: DISCONTINUED | OUTPATIENT
Start: 2018-04-24 | End: 2018-04-24 | Stop reason: HOSPADM

## 2018-04-24 RX ORDER — CYCLOPENTOLATE HYDROCHLORIDE 20 MG/ML
1 SOLUTION/ DROPS OPHTHALMIC
Status: COMPLETED | OUTPATIENT
Start: 2018-04-24 | End: 2018-04-24

## 2018-04-24 RX ORDER — ONDANSETRON 4 MG/1
4 TABLET, ORALLY DISINTEGRATING ORAL EVERY 30 MIN PRN
Status: DISCONTINUED | OUTPATIENT
Start: 2018-04-24 | End: 2018-04-24 | Stop reason: HOSPADM

## 2018-04-24 RX ORDER — LIDOCAINE HYDROCHLORIDE 10 MG/ML
INJECTION, SOLUTION EPIDURAL; INFILTRATION; INTRACAUDAL; PERINEURAL PRN
Status: DISCONTINUED | OUTPATIENT
Start: 2018-04-24 | End: 2018-04-24 | Stop reason: HOSPADM

## 2018-04-24 RX ORDER — PHENYLEPHRINE HYDROCHLORIDE 25 MG/ML
1 SOLUTION/ DROPS OPHTHALMIC
Status: COMPLETED | OUTPATIENT
Start: 2018-04-24 | End: 2018-04-24

## 2018-04-24 RX ORDER — SODIUM CHLORIDE, SODIUM LACTATE, POTASSIUM CHLORIDE, CALCIUM CHLORIDE 600; 310; 30; 20 MG/100ML; MG/100ML; MG/100ML; MG/100ML
INJECTION, SOLUTION INTRAVENOUS CONTINUOUS
Status: DISCONTINUED | OUTPATIENT
Start: 2018-04-24 | End: 2018-04-24 | Stop reason: HOSPADM

## 2018-04-24 RX ORDER — ONDANSETRON 2 MG/ML
4 INJECTION INTRAMUSCULAR; INTRAVENOUS EVERY 30 MIN PRN
Status: DISCONTINUED | OUTPATIENT
Start: 2018-04-24 | End: 2018-04-24 | Stop reason: HOSPADM

## 2018-04-24 RX ORDER — MOXIFLOXACIN 5 MG/ML
SOLUTION/ DROPS OPHTHALMIC PRN
Status: DISCONTINUED | OUTPATIENT
Start: 2018-04-24 | End: 2018-04-24 | Stop reason: HOSPADM

## 2018-04-24 RX ORDER — NALOXONE HYDROCHLORIDE 0.4 MG/ML
.1-.4 INJECTION, SOLUTION INTRAMUSCULAR; INTRAVENOUS; SUBCUTANEOUS
Status: DISCONTINUED | OUTPATIENT
Start: 2018-04-24 | End: 2018-04-24 | Stop reason: HOSPADM

## 2018-04-24 RX ORDER — MEPERIDINE HYDROCHLORIDE 25 MG/ML
12.5 INJECTION INTRAMUSCULAR; INTRAVENOUS; SUBCUTANEOUS
Status: DISCONTINUED | OUTPATIENT
Start: 2018-04-24 | End: 2018-04-24 | Stop reason: HOSPADM

## 2018-04-24 RX ORDER — LABETALOL HYDROCHLORIDE 5 MG/ML
10 INJECTION, SOLUTION INTRAVENOUS
Status: DISCONTINUED | OUTPATIENT
Start: 2018-04-24 | End: 2018-04-24 | Stop reason: HOSPADM

## 2018-04-24 RX ORDER — HYDRALAZINE HYDROCHLORIDE 20 MG/ML
2.5-5 INJECTION INTRAMUSCULAR; INTRAVENOUS EVERY 10 MIN PRN
Status: DISCONTINUED | OUTPATIENT
Start: 2018-04-24 | End: 2018-04-24 | Stop reason: HOSPADM

## 2018-04-24 RX ORDER — ALBUTEROL SULFATE 0.83 MG/ML
2.5 SOLUTION RESPIRATORY (INHALATION) EVERY 4 HOURS PRN
Status: DISCONTINUED | OUTPATIENT
Start: 2018-04-24 | End: 2018-04-24 | Stop reason: HOSPADM

## 2018-04-24 RX ORDER — LEVOBUNOLOL HYDROCHLORIDE 5 MG/ML
SOLUTION/ DROPS OPHTHALMIC PRN
Status: DISCONTINUED | OUTPATIENT
Start: 2018-04-24 | End: 2018-04-24 | Stop reason: HOSPADM

## 2018-04-24 RX ORDER — PROPARACAINE HYDROCHLORIDE 5 MG/ML
1 SOLUTION/ DROPS OPHTHALMIC
Status: COMPLETED | OUTPATIENT
Start: 2018-04-24 | End: 2018-04-24

## 2018-04-24 RX ORDER — TETRACAINE HYDROCHLORIDE 5 MG/ML
SOLUTION OPHTHALMIC PRN
Status: DISCONTINUED | OUTPATIENT
Start: 2018-04-24 | End: 2018-04-24 | Stop reason: HOSPADM

## 2018-04-24 RX ORDER — PILOCARPINE HYDROCHLORIDE 10 MG/ML
SOLUTION/ DROPS OPHTHALMIC PRN
Status: DISCONTINUED | OUTPATIENT
Start: 2018-04-24 | End: 2018-04-24 | Stop reason: HOSPADM

## 2018-04-24 RX ORDER — PHENYLEPHRINE HYDROCHLORIDE 100 MG/ML
1 SOLUTION/ DROPS OPHTHALMIC
Status: DISCONTINUED | OUTPATIENT
Start: 2018-04-24 | End: 2018-04-24 | Stop reason: HOSPADM

## 2018-04-24 RX ADMIN — MIDAZOLAM 1 MG: 1 INJECTION INTRAMUSCULAR; INTRAVENOUS at 09:05

## 2018-04-24 RX ADMIN — MOXIFLOXACIN 0.5 ML: 5 SOLUTION/ DROPS OPHTHALMIC at 08:19

## 2018-04-24 RX ADMIN — CYCLOPENTOLATE HYDROCHLORIDE 1 DROP: 20 SOLUTION/ DROPS OPHTHALMIC at 08:19

## 2018-04-24 RX ADMIN — LIDOCAINE HYDROCHLORIDE 2 ML: 10 INJECTION, SOLUTION EPIDURAL; INFILTRATION; INTRACAUDAL; PERINEURAL at 09:13

## 2018-04-24 RX ADMIN — ACETAMINOPHEN 650 MG: 325 TABLET ORAL at 08:03

## 2018-04-24 RX ADMIN — PILOCARPINE HYDROCHLORIDE 1 DROP: 10 SOLUTION/ DROPS OPHTHALMIC at 09:15

## 2018-04-24 RX ADMIN — LEVOBUNOLOL HYDROCHLORIDE 1 DROP: 5 SOLUTION/ DROPS OPHTHALMIC at 09:12

## 2018-04-24 RX ADMIN — EPINEPHRINE 350 ML: 1 INJECTION, SOLUTION INTRAMUSCULAR; SUBCUTANEOUS at 09:12

## 2018-04-24 RX ADMIN — PHENYLEPHRINE HYDROCHLORIDE 1 DROP: 25 SOLUTION/ DROPS OPHTHALMIC at 08:19

## 2018-04-24 RX ADMIN — Medication 0.8 ML: at 09:15

## 2018-04-24 RX ADMIN — MOXIFLOXACIN HYDROCHLORIDE 1 DROP: 5 SOLUTION/ DROPS OPHTHALMIC at 09:14

## 2018-04-24 RX ADMIN — PROPARACAINE HYDROCHLORIDE 1 DROP: 5 SOLUTION/ DROPS OPHTHALMIC at 08:05

## 2018-04-24 RX ADMIN — TETRACAINE HYDROCHLORIDE 2 DROP: 5 SOLUTION OPHTHALMIC at 09:10

## 2018-04-24 RX ADMIN — CYCLOPENTOLATE HYDROCHLORIDE 1 DROP: 20 SOLUTION/ DROPS OPHTHALMIC at 08:06

## 2018-04-24 NOTE — ANESTHESIA PREPROCEDURE EVALUATION
Anesthesia Evaluation     . Pt has had prior anesthetic.     No history of anesthetic complications          ROS/MED HX    ENT/Pulmonary:  - neg pulmonary ROS     Neurologic:  - neg neurologic ROS     Cardiovascular:     (+) Dyslipidemia, ----. : . . . :. .       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      Psychiatric:  - neg psychiatric ROS       Infectious Disease:  - neg infectious disease ROS       Malignancy:      - no malignancy   Other:    - neg other ROS                 Physical Exam  Normal systems: cardiovascular, pulmonary and dental    Airway   Mallampati: III  TM distance: >3 FB  Neck ROM: full    Dental     Cardiovascular   Rhythm and rate: regular and normal      Pulmonary    breath sounds clear to auscultation                    Anesthesia Plan      History & Physical Review  History and physical reviewed and following examination; no interval change.    ASA Status:  2 .    NPO Status:  > 8 hours    Plan for Other with Other induction. Maintenance will be Other.    PONV prophylaxis:  Ondansetron (or other 5HT-3)  Risks and benefits of MAC anesthetic discussed including dental damage, aspiration, loss of airway, conversion to general anesthetic, CV complications, MI, stroke, death. Pt wishes to proceed.       Postoperative Care  Postoperative pain management:  IV analgesics.      Consents  Anesthetic plan, risks, benefits and alternatives discussed with:  Patient..                          .

## 2018-04-24 NOTE — OP NOTE
[facility]  Ophthalmology Full Operative Note    Pre-operative diagnosis: NUCLEAR SCLEROSIS LEFT, ASTIGMATISM   Post-operative diagnosis Same   Procedure: Procedure(s):  PHACOEMULSIFICATION CATARACT EXTRACTION POSTERIOR CHAMBER LENS LEFT/TECNIS-TORIC, 10% CORINA - Wound Class: I-Clean   Surgeon: Jerome Snow MD   Assistants(s):    Anesthesia: Combined MAC with Topical    Estimated blood loss: None    Total IV fluids: (See anesthesia record)   Specimens: None   Implants: 18.5/+2.25 [id number]   Findings:    Complications: None   Condition: Stable   Comments:       PROCEDURAL ANESTHESIA:     Topical/MAC with IV sedation.  Lidocaine 2% jelly topically and Lidocaine 1% preservative-free intracamerally.     PROCEDURE:  The patient was brought to the Operating Room and prepped and draped in a sterile manner.  A wire lid speculum was placed.  A paracentesis was created and 1% Lidocaine was instilled in the anterior chamber.  The anterior chamber was then filled with Amvisc viscoelastic.  A clear cornea temporal wound was created using a 2.8 mm keratome.  A cystotome was used to initiate a flap in the anterior capsule and a Utrata forceps was used to create a continuous tear capsulorhexis.  Hydrodissection was performed.  The phacoemulsification tip was inserted into the eye and the nucleus and epinucleus were removed using a divide and conquer technique.  The residual cortex was removed using the I/A handpiece.  The anterior chamber was then refilled with viscoelastic and the wound was enlarged with the keratome.  The intraocular lens, 18.5/+2.25 diopter, Model [id number], was placed into the injector and injected into the capsular bag. Toric lens placed at 113 degrees using Root ruler and ToriCam marks.  It was checked to make sure that it was central and stable.  Residual viscoelastic was removed using the I/A.  The anterior chamber was refilled with BSS.  The wounds were hydrated with BSS and were noted to be  watertight with no suture necessary.  Topical pilocarpine 1%, Betagan, and Vigamox was applied.  A hard shield was placed.     The patient tolerated the procedure well and was sent to the Recovery Room in satisfactory condition.

## 2018-04-24 NOTE — ANESTHESIA CARE TRANSFER NOTE
Patient: Katelyn Manley    Procedure(s):  PHACOEMULSIFICATION CATARACT EXTRACTION POSTERIOR CHAMBER LENS LEFT/TECNIS-TORIC, 10% CORINA - Wound Class: I-Clean    Diagnosis: NUCLEAR SCLEROSIS LEFT  Diagnosis Additional Information: No value filed.    Anesthesia Type:   Other     Note:  Airway :Nasal Cannula  Patient transferred to:Phase II  Handoff Report: Identifed the Patient, Identified the Reponsible Provider, Reviewed the pertinent medical history, Discussed the surgical course, Reviewed Intra-OP anesthesia mangement and issues during anesthesia, Set expectations for post-procedure period and Allowed opportunity for questions and acknowledgement of understanding      Vitals: (Last set prior to Anesthesia Care Transfer)    CRNA VITALS  4/24/2018 0902 - 4/24/2018 0932      4/24/2018             Resp Rate (set): 8                Electronically Signed By: LUIS Schmid CRNA  April 24, 2018  9:32 AM

## 2018-04-24 NOTE — IP AVS SNAPSHOT
[facility]    [address] [address]    Phone:  [phone]                                       After Visit Summary   4/24/2018    Katelyn Manley    MRN: [id number]           After Visit Summary Signature Page     I have received my discharge instructions, and my questions have been answered. I have discussed any challenges I see with this plan with the nurse or doctor.    ..........................................................................................................................................  Patient/Patient Representative Signature      ..........................................................................................................................................  Patient Representative Print Name and Relationship to Patient    ..................................................               ................................................  Date                                            Time    ..........................................................................................................................................  Reviewed by Signature/Title    ...................................................              ..............................................  Date                                                            Time

## 2018-04-24 NOTE — ANESTHESIA POSTPROCEDURE EVALUATION
Patient: Katelyn Manley    Procedure(s):  PHACOEMULSIFICATION CATARACT EXTRACTION POSTERIOR CHAMBER LENS LEFT/TECNIS-TORIC, 10% CORINA - Wound Class: I-Clean    Diagnosis:NUCLEAR SCLEROSIS LEFT  Diagnosis Additional Information: No value filed.    Anesthesia Type:  Other    Note:  Anesthesia Post Evaluation    Patient location during evaluation: Bedside  Patient participation: Able to fully participate in evaluation  Level of consciousness: awake and alert  Pain management: adequate  Airway patency: patent  Cardiovascular status: acceptable  Respiratory status: acceptable  Hydration status: acceptable  PONV: none     Anesthetic complications: None          Last vitals:  Vitals:    04/24/18 0955 04/24/18 1000 04/24/18 1005   BP: 113/87 111/82    Resp:  18    Temp:      SpO2: 96% 96% 97%         Electronically Signed By: LUIS Reis CRNA  April 24, 2018  10:37 AM

## 2018-04-24 NOTE — OR NURSING
Patient and responsible adult given discharge instructions with no questions regarding instructions. French score 20. Pain level 0/10.  Discharged from unit via ambulated. Patient discharged to home.

## 2018-04-24 NOTE — IP AVS SNAPSHOT
MRN:[id number]                      After Visit Summary   4/24/2018    Katelyn Manley    MRN: [id number]           Thank you!     Thank you for choosing [facility] for your care. Our goal is always to provide you with excellent care. Hearing back from our patients is one way we can continue to improve our services. Please take a few minutes to complete the written survey that you may receive in the mail after you visit with us. Thank you!        Patient Information     Date Of Birth          1955        About your hospital stay     You were admitted on:  April 24, 2018 You last received care in the:  [facility] Preop/Phase II    You were discharged on:  April 24, 2018        Reason for your hospital stay       Cataract surgery left eye done.                  Who to Call     For medical emergencies, please call 911.  For non-urgent questions about your medical care, please call your primary care provider or clinic, [phone]  For questions related to your surgery, please call your surgery clinic        Attending Provider     Provider Specialty    Jerome Snow MD Ophthalmology       Primary Care Provider Office Phone # Fax #    Robert Blakely [phone]      After Care Instructions     Nursing Communication 1       Eyedrops, shield, and activities per post op pamphlet.            Patient care order       Patient has Vigamox and Durezol and Ilevro drops at home.  These should be used:  Vigamox 1 drop operative eye QID for 1 week.  Durezol 1 drop operative eye BID for 4 weeks.  Ilevro 1 drop operative eye QD for 6 weeks.                  Follow-up Appointments     Follow-up and recommended labs and tests       Follow up tomorrow at the [facility].                  Your next 10 appointments already scheduled     Sep 14, 2018  8:15 AM CDT   (Arrive by 8:00 AM)   MA SCREENING DIGITAL BILATERAL with HCMA1   [facility] ([facility] )  "   [address]  [place] [address]   [phone]           Do not use any powder, lotion or deodorant under your arms or on your breast. If you do, we will ask you to remove it before your exam.  Wear comfortable, two-piece clothing.  If you have any allergies, tell your care team.  Bring any previous mammograms from other facilities or have them mailed to the breast center. Three-dimensional (3D) mammograms are available at [facility] locations in [facility], [facility], [place], [place], and [place]. [facility] locations include [place] and [facility] Center in [place]. Benefits of 3D mammograms include: - Improved rate of cancer detection - Decreases your chance of having to go back for more tests, which means fewer: - \"False-positive\" results (This means that there is an abnormal area but it isn't cancer.) - Invasive testing procedures, such as a biopsy or surgery - Can provide clearer images of the breast if you have dense breast tissue. 3D mammography is an optional exam that anyone can have with a 2D mammogram. It doesn't replace or take the place of a 2D mammogram. 2D mammograms remain an effective screening test for all women.  Not all insurance companies cover the cost of a 3D mammogram. Check with your insurance.            Sep 28, 2018 10:00 AM CDT   (Arrive by 9:45 AM)   PHYSICAL with Robert Blakely MD   [facility] ([facility] [place] )    [address] [place] [address]   [phone]              Further instructions from your care team           Post-Anesthesia Patient Instructions    IMMEDIATELY FOLLOWING SURGERY:  Do not drive or operate machinery for the first twenty four hours after surgery.  Do not make any important decisions for twenty four hours after surgery or while taking narcotic pain medications or sedatives.  If you develop intractable nausea and vomiting or a severe headache please notify your doctor " "immediately.    FOLLOW-UP:  Please make an appointment with your surgeon as instructed. You do not need to follow up with anesthesia unless specifically instructed to do so.    WOUND CARE INSTRUCTIONS (if applicable):  Keep a dry clean dressing on the anesthesia/puncture wound site if there is drainage.  Once the wound has quit draining you may leave it open to air.  Generally you should leave the bandage intact for twenty four hours unless there is drainage.  If the epidural site drains for more than 36-48 hours please call the anesthesia department.    QUESTIONS?:  Please feel free to call your physician or the hospital  if you have any questions, and they will be happy to assist you.       Pending Results     No orders found from 2018 to 2018.            Admission Information     Date & Time Provider Department Dept. Phone    2018 Jerome Snow MD HI Preop/Phase [phone]      Your Vitals Were     Blood Pressure Temperature Respirations Height Weight Pulse Oximetry    129/83 97  F (36.1  C) (Oral) 16 1.613 m (5' 3.5\") 71.7 kg (158 lb) 95%    BMI (Body Mass Index)                   27.55 kg/m2           MyChart Information     slinkset lets you send messages to your doctor, view your test results, renew your prescriptions, schedule appointments and more. To sign up, go to [email or web address] . Click on \"Log in\" on the left side of the screen, which will take you to the Welcome page. Then click on \"Sign up Now\" on the right side of the page.     You will be asked to enter the access code listed below, as well as some personal information. Please follow the directions to create your username and password.     Your access code is: [email or web address]  Expires: 7/3/2018  3:29 PM     Your access code will  in 90 days. If you need help or a new code, please call your [facility] clinic or [phone].        Care EveryWhere ID     This is your Care EveryWhere ID. This could be used by " other organizations to access your [facility] medical records  [id number]        Equal Access to Services     ONURVANITA RAJAT : Trena Best, doron howell, monyекатерина lunsfordemileeevangelista bentley, sascha francoliatkiera bond. So [facility] [phone].    ATENCIÓN: Si habla español, tiene a bennett disposición servicios gratuitos de asistencia lingüística. Llame al [phone].    We comply with applicable federal civil rights laws and [place] laws. We do not discriminate on the basis of race, color, national origin, age, disability, sex, sexual orientation, or gender identity.               Review of your medicines      UNREVIEWED medicines. Ask your doctor about these medicines        Dose / Directions    azithromycin 250 MG tablet   Commonly known as:  ZITHROMAX   Used for:  Upper respiratory tract infection, unspecified type        As directed   Quantity:  6 tablet   Refills:  0         CONTINUE these medicines which have NOT CHANGED        Dose / Directions    albuterol 108 (90 Base) MCG/ACT Inhaler   Commonly known as:  PROAIR HFA/PROVENTIL HFA/VENTOLIN HFA   Used for:  Reactive airway disease that is not asthma        Dose:  2 puff   Inhale 2 puffs into the lungs every 6 hours as needed for shortness of breath / dyspnea or wheezing   Quantity:  1 Inhaler   Refills:  1       COLACE PO        Dose:  100 mg   Take 100 mg by mouth daily   Refills:  0       GLUCOSAMINE SULFATE PO        Dose:  2000 mg   Take 2,000 mg by mouth daily   Refills:  0       METAMUCIL PO        Refills:  0       MULTIVITAMIN PO        Dose:  1 capsule   Take 1 capsule by mouth daily.   Refills:  0       nystatin-triamcinolone cream   Commonly known as:  MYCOLOG II        Apply topically daily   Refills:  0                Protect others around you: Learn how to safely use, store and throw away your medicines at www.disposemymeds.org.             Medication List: This is a list of all your medications and when to take them. Check  marks below indicate your daily home schedule. Keep this list as a reference.      Medications           Morning Afternoon Evening Bedtime As Needed    albuterol 108 (90 Base) MCG/ACT Inhaler   Commonly known as:  PROAIR HFA/PROVENTIL HFA/VENTOLIN HFA   Inhale 2 puffs into the lungs every 6 hours as needed for shortness of breath / dyspnea or wheezing                                azithromycin 250 MG tablet   Commonly known as:  ZITHROMAX   As directed                                COLACE PO   Take 100 mg by mouth daily                                GLUCOSAMINE SULFATE PO   Take 2,000 mg by mouth daily                                METAMUCIL PO                                MULTIVITAMIN PO   Take 1 capsule by mouth daily.                                nystatin-triamcinolone cream   Commonly known as:  MYCOLOG II   Apply topically daily

## 2018-05-15 DIAGNOSIS — R21 RASH AND NONSPECIFIC SKIN ERUPTION: Primary | ICD-10-CM

## 2018-05-17 RX ORDER — NYSTATIN AND TRIAMCINOLONE ACETONIDE 100000; 1 [USP'U]/G; MG/G
CREAM TOPICAL
Qty: 420 G | Status: SHIPPED | OUTPATIENT
Start: 2018-05-17 | End: 2020-09-02

## 2018-07-03 ENCOUNTER — OFFICE VISIT (OUTPATIENT)
Dept: FAMILY MEDICINE | Facility: OTHER | Age: 63
End: 2018-07-03
Attending: FAMILY MEDICINE
Payer: COMMERCIAL

## 2018-07-03 VITALS
TEMPERATURE: 97.7 F | RESPIRATION RATE: 18 BRPM | HEART RATE: 85 BPM | SYSTOLIC BLOOD PRESSURE: 110 MMHG | DIASTOLIC BLOOD PRESSURE: 68 MMHG | OXYGEN SATURATION: 98 % | BODY MASS INDEX: 27.14 KG/M2 | WEIGHT: 159 LBS | HEIGHT: 64 IN

## 2018-07-03 DIAGNOSIS — L98.9 SKIN LESION: Primary | ICD-10-CM

## 2018-07-03 PROCEDURE — 99213 OFFICE O/P EST LOW 20 MIN: CPT | Performed by: FAMILY MEDICINE

## 2018-07-03 ASSESSMENT — PAIN SCALES - GENERAL: PAINLEVEL: NO PAIN (0)

## 2018-07-03 ASSESSMENT — ANXIETY QUESTIONNAIRES
6. BECOMING EASILY ANNOYED OR IRRITABLE: NOT AT ALL
3. WORRYING TOO MUCH ABOUT DIFFERENT THINGS: NOT AT ALL
7. FEELING AFRAID AS IF SOMETHING AWFUL MIGHT HAPPEN: NOT AT ALL
1. FEELING NERVOUS, ANXIOUS, OR ON EDGE: NOT AT ALL
5. BEING SO RESTLESS THAT IT IS HARD TO SIT STILL: NOT AT ALL
GAD7 TOTAL SCORE: 1
2. NOT BEING ABLE TO STOP OR CONTROL WORRYING: SEVERAL DAYS
IF YOU CHECKED OFF ANY PROBLEMS ON THIS QUESTIONNAIRE, HOW DIFFICULT HAVE THESE PROBLEMS MADE IT FOR YOU TO DO YOUR WORK, TAKE CARE OF THINGS AT HOME, OR GET ALONG WITH OTHER PEOPLE: NOT DIFFICULT AT ALL

## 2018-07-03 ASSESSMENT — PATIENT HEALTH QUESTIONNAIRE - PHQ9: 5. POOR APPETITE OR OVEREATING: NOT AT ALL

## 2018-07-03 NOTE — PROGRESS NOTES
"  SUBJECTIVE:                                                    Katelyn Manley is a 63 year old female who presents to clinic today for the following health issues:        Derm Problem      Duration: over a year    Description (location/character/radiation): spot on Left side of thigh    Intensity:  none    Accompanying signs and symptoms: pt states she picks at it, it scabs and it comes back    History (similar episodes/previous evaluation): None    Precipitating or alleviating factors: None    Therapies tried and outcome: None           Problem list and histories reviewed & adjusted, as indicated.  Additional history: as documented    Labs reviewed in EPIC    ROS:  CONSTITUTIONAL: NEGATIVE for fever, chills, change in weight  RESP: NEGATIVE for significant cough or SOB  CV: NEGATIVE for chest pain, palpitations or peripheral edema    OBJECTIVE:                                                    /68 (BP Location: Left arm, Patient Position: Sitting, Cuff Size: Adult Regular)  Pulse 85  Temp 97.7  F (36.5  C) (Tympanic)  Resp 18  Ht 5' 3.5\" (1.613 m)  Wt 159 lb (72.1 kg)  SpO2 98%  BMI 27.72 kg/m2  Body mass index is 27.72 kg/(m^2).   GENERAL: healthy, alert, well nourished, well hydrated, no distress  SKIN: several lesions reviewed and looked to be solar lentigos.  There is 5mm hyperkeratotic lesion on left thigh - may be AK vs SCC vs dermatofibroma       ASSESSMENT/PLAN:                                                      (L98.9) Skin lesion  (primary encounter diagnosis)  Comment: most are benign   Plan: Recommend removal of left thigh lesion. She can not do it today. Will do it in the fall. Appt made.           Robert Blakely MD  [facility] [place]      "

## 2018-07-03 NOTE — MR AVS SNAPSHOT
"              After Visit Summary   7/3/2018    Katelyn Manley    MRN: [id number]           Patient Information     Date Of Birth          1955        Visit Information        Provider Department      7/3/2018 2:45 PM Robert Blakely MD [facility] [place]         Follow-ups after your visit        Your next 10 appointments already scheduled     Sep 14, 2018  8:15 AM CDT   (Arrive by 8:00 AM)   MA SCREENING DIGITAL BILATERAL with HCMA1   [facility] [place] [facility] ([facility] [place] )    [address]  [place] [address]   [phone]           Do not use any powder, lotion or deodorant under your arms or on your breast. If you do, we will ask you to remove it before your exam.  Wear comfortable, two-piece clothing.  If you have any allergies, tell your care team.  Bring any previous mammograms from other facilities or have them mailed to the breast center. Three-dimensional (3D) mammograms are available at [facility] locations in [facility], [facility], [place], [place], and [place]. [facility] locations include [place] and [facility] [place] in [place]. Benefits of 3D mammograms include: - Improved rate of cancer detection - Decreases your chance of having to go back for more tests, which means fewer: - \"False-positive\" results (This means that there is an abnormal area but it isn't cancer.) - Invasive testing procedures, such as a biopsy or surgery - Can provide clearer images of the breast if you have dense breast tissue. 3D mammography is an optional exam that anyone can have with a 2D mammogram. It doesn't replace or take the place of a 2D mammogram. 2D mammograms remain an effective screening test for all women.  Not all insurance companies cover the cost of a 3D mammogram. Check with your insurance.            Sep 17, 2018 11:15 AM CDT   (Arrive by 11:00 AM)   PROCEDURE with [facility] [place] ([facility] " "[facility] )    [address]  [facility] [address]   [phone]            Sep 28, 2018 10:00 AM CDT   (Arrive by 9:45 AM)   PHYSICAL with Robert Blakely MD   [facility] [place] ([facility] )    [address]  [place] [address]   [phone]              Who to contact     If you have questions or need follow up information about today's clinic visit or your schedule please contact [facility] directly at [phone].  Normal or non-critical lab and imaging results will be communicated to you by MyChart, letter or phone within 4 business days after the clinic has received the results. If you do not hear from us within 7 days, please contact the clinic through MyChart or phone. If you have a critical or abnormal lab result, we will notify you by phone as soon as possible.  Submit refill requests through [facility] or call your pharmacy and they will forward the refill request to us. Please allow 3 business days for your refill to be completed.          Additional Information About Your Visit        Care EveryWhere ID     This is your Care EveryWhere ID. This could be used by other organizations to access your [facility] medical records  [id number]        Your Vitals Were     Pulse Temperature Respirations Height Pulse Oximetry BMI (Body Mass Index)    85 97.7  F (36.5  C) (Tympanic) 18 5' 3.5\" (1.613 m) 98% 27.72 kg/m2       Blood Pressure from Last 3 Encounters:   07/03/18 110/68   04/24/18 111/82   04/13/18 120/64    Weight from Last 3 Encounters:   07/03/18 159 lb (72.1 kg)   04/24/18 158 lb (71.7 kg)   04/13/18 158 lb (71.7 kg)              Today, you had the following     No orders found for display       Primary Care Provider Office Phone # Fax #    Robert Blakely [phone] [address] [facility] [address]        Equal Access to Services     VELVET EARL AH: Hadii lamont lainezo Sokrystal, waaxda luqadacrista, qaybta kaalmada " sascha bentleyjamaica salvadornazario byrdaan ah. So [facility] [phone].    ATENCIÓN: Si barringtonla tanja, tiene a bennett disposición servicios gratuitos de asistencia lingüística. Sakina al [phone].    We comply with applicable federal civil rights laws and [place] laws. We do not discriminate on the basis of race, color, national origin, age, disability, sex, sexual orientation, or gender identity.            Thank you!     Thank you [facility]  for your care. Our goal is always to provide you with excellent care. Hearing back from our patients is one way we can continue to improve our services. Please take a few minutes to complete the written survey that you may receive in the mail after your visit with us. Thank you!             Your Updated Medication List - Protect others around you: Learn how to safely use, store and throw away your medicines at www.disposemymeds.org.          This list is accurate as of 7/3/18  3:02 PM.  Always use your most recent med list.                   Brand Name Dispense Instructions for use Diagnosis    albuterol 108 (90 Base) MCG/ACT Inhaler    PROAIR HFA/PROVENTIL HFA/VENTOLIN HFA    1 Inhaler    Inhale 2 puffs into the lungs every 6 hours as needed for shortness of breath / dyspnea or wheezing    Reactive airway disease that is not asthma       COLACE PO      Take 100 mg by mouth daily        GLUCOSAMINE SULFATE PO      Take 2,000 mg by mouth daily        ILEVRO OP           METAMUCIL PO           MULTIVITAMIN PO      Take 1 capsule by mouth daily.        nystatin-triamcinolone cream    MYCOLOG II    420 g    APPLY A PEA SIZE AMOUNT TO AFFECTED AREA AT BEDTIME AS DIRECTED    Rash and nonspecific skin eruption

## 2018-07-03 NOTE — NURSING NOTE
"Chief Complaint   Patient presents with     Derm Problem     left thigh bump       Initial /68 (BP Location: Left arm, Patient Position: Sitting, Cuff Size: Adult Regular)  Pulse 85  Temp 97.7  F (36.5  C) (Tympanic)  Resp 18  Ht 5' 3.5\" (1.613 m)  Wt 159 lb (72.1 kg)  SpO2 98%  BMI 27.72 kg/m2 Estimated body mass index is 27.72 kg/(m^2) as calculated from the following:    Height as of this encounter: 5' 3.5\" (1.613 m).    Weight as of this encounter: 159 lb (72.1 kg).  Medication Reconciliation: complete    Jenny Mauricio MA  "

## 2018-07-04 ASSESSMENT — ANXIETY QUESTIONNAIRES: GAD7 TOTAL SCORE: 1

## 2018-07-04 ASSESSMENT — PATIENT HEALTH QUESTIONNAIRE - PHQ9: SUM OF ALL RESPONSES TO PHQ QUESTIONS 1-9: 0

## 2018-08-28 ENCOUNTER — HEALTH MAINTENANCE LETTER (OUTPATIENT)
Age: 63
End: 2018-08-28

## 2018-09-10 ENCOUNTER — TELEPHONE (OUTPATIENT)
Dept: FAMILY MEDICINE | Facility: OTHER | Age: 63
End: 2018-09-10

## 2018-09-10 ENCOUNTER — OFFICE VISIT (OUTPATIENT)
Dept: FAMILY MEDICINE | Facility: OTHER | Age: 63
End: 2018-09-10
Attending: FAMILY MEDICINE
Payer: COMMERCIAL

## 2018-09-10 VITALS
OXYGEN SATURATION: 98 % | TEMPERATURE: 98.4 F | HEART RATE: 77 BPM | SYSTOLIC BLOOD PRESSURE: 100 MMHG | DIASTOLIC BLOOD PRESSURE: 66 MMHG | WEIGHT: 158 LBS | BODY MASS INDEX: 26.98 KG/M2 | HEIGHT: 64 IN

## 2018-09-10 DIAGNOSIS — K57.32 DIVERTICULITIS OF COLON: Primary | ICD-10-CM

## 2018-09-10 PROCEDURE — 99213 OFFICE O/P EST LOW 20 MIN: CPT | Performed by: FAMILY MEDICINE

## 2018-09-10 ASSESSMENT — ANXIETY QUESTIONNAIRES
1. FEELING NERVOUS, ANXIOUS, OR ON EDGE: NOT AT ALL
7. FEELING AFRAID AS IF SOMETHING AWFUL MIGHT HAPPEN: NOT AT ALL
GAD7 TOTAL SCORE: 0
2. NOT BEING ABLE TO STOP OR CONTROL WORRYING: NOT AT ALL
4. TROUBLE RELAXING: NOT AT ALL
3. WORRYING TOO MUCH ABOUT DIFFERENT THINGS: NOT AT ALL
5. BEING SO RESTLESS THAT IT IS HARD TO SIT STILL: NOT AT ALL
6. BECOMING EASILY ANNOYED OR IRRITABLE: NOT AT ALL

## 2018-09-10 ASSESSMENT — PAIN SCALES - GENERAL: PAINLEVEL: MILD PAIN (2)

## 2018-09-10 NOTE — TELEPHONE ENCOUNTER
11:30 AM    Reason for Call: OVERBOOK    Patient is having the following symptoms: flare up diverticulitis for 1 weeks.    The patient is requesting an appointment for 09/10/18 with . She would  Need it this afternoon    Was an appointment offered for this call? No  If yes : Appointment type              Date    Preferred method for responding to this message: Telephone Call  What is your phone number ?[phone]    If we cannot reach you directly, may we leave a detailed response at the number you provided? Yes    Can this message wait until your PCP/provider returns, if unavailable today? Not applicable, [facility]     [facility]

## 2018-09-10 NOTE — PROGRESS NOTES
"  SUBJECTIVE:   Katelyn Manley is a 63 year old female who presents to clinic today for the following health issues:      Abdominal Pain      Duration: wednesday    Description (location/character/radiation): center abdomen and low back pain       Associated flank pain: None    Intensity:  mild    Accompanying signs and symptoms:        Fever/Chills: YES- chills Friday night       Gas/Bloating: YES- Friday night       Nausea/vomitting: no        Diarrhea: no        Dysuria or Hematuria: no     History (previous similar pain/trauma/previous testing): none    Precipitating or alleviating factors:       Pain worse with eating/BM/urination: eating       Pain relieved by BM: no     Therapies tried and outcome: laxatives and ibuprofen    LMP:  not applicable    Low to low left     Dull ache and low grade fever    Not feeling well    Has had diverticulitis twice in past and feels the same    No n/v- having some stools    Not getting better but not getting worse.    No  sx          Problem list and histories reviewed & adjusted, as indicated.  Additional history: as documented    Labs reviewed in EPIC    Reviewed and updated as needed this visit by clinical staff       Reviewed and updated as needed this visit by Provider         ROS:  CONSTITUTIONAL: NEGATIVE for fever, chills, change in weight  ENT/MOUTH: NEGATIVE for ear, mouth and throat problems  RESP: NEGATIVE for significant cough or SOB  CV: NEGATIVE for chest pain, palpitations or peripheral edema    Colonoscopy UTD  OBJECTIVE:                                                    /66  Pulse 77  Temp 98.4  F (36.9  C)  Ht 5' 3.5\" (1.613 m)  Wt 158 lb (71.7 kg)  SpO2 98%  BMI 27.55 kg/m2  Body mass index is 27.55 kg/(m^2).   GENERAL: healthy, alert, well nourished, well hydrated, no distress- not toxic  NECK: no tenderness, no adenopathy, no asymmetry, no masses, no stiffness; thyroid- normal to palpation  RESP: lungs clear to auscultation - no rales, no " rhonchi, no wheezes  CV: regular rates and rhythm, normal S1 S2, no S3 or S4 and no murmur, no click or rub -  ABDOMEN: soft, diffuse lower abdominal mild RLQ but more suprapubic and LLQ tenderness, no  hepatosplenomegaly, no masses, normal bowel sounds, mild guarding - no rebound         ASSESSMENT/PLAN:                                                    1. Diverticulitis of colon  Seems to classic brewing diverticulitis. No s/s of abscess/surgical belly. Does not have any  sx.  Still has appendix but not focal pain just there and not toxic for having this for 4-5 days.Will treat with below. Symptomatic treatment was discussed along when patient should call and/or come back into the clinic or go to ER/Urgent care. All questions answered.   bland diet. Return if get worse.   - amoxicillin-clavulanate (AUGMENTIN) 875-125 MG per tablet; Take 1 tablet by mouth 2 times daily  Dispense: 20 tablet; Refill: 0  Pt is a nurse and agrees with plan.         Robert Blakely MD  [facility]

## 2018-09-10 NOTE — MR AVS SNAPSHOT
"              After Visit Summary   9/10/2018    Katelyn Manley    MRN: [id number]           Patient Information     Date Of Birth          1955        Visit Information        Provider Department      9/10/2018 3:30 PM Robert Blakely MD [facility]        Today's Diagnoses     Diverticulitis of colon    -  1       Follow-ups after your visit        Your next 10 appointments already scheduled     Sep 14, 2018  8:15 AM CDT   (Arrive by 8:00 AM)   MA SCREENING DIGITAL BILATERAL with HCMA1   [facility] [place] [facility] ([facility] [place] )    [address]  [place] [address]   [phone]           Do not use any powder, lotion or deodorant under your arms or on your breast. If you do, we will ask you to remove it before your exam.  Wear comfortable, two-piece clothing.  If you have any allergies, tell your care team.  Bring any previous mammograms from other facilities or have them mailed to the breast center. Three-dimensional (3D) mammograms are available at [facility] locations in [facility], [facility], [place], [place], and [place]. [facility] locations include [place] and [facility] in [place]. Benefits of 3D mammograms include: - Improved rate of cancer detection - Decreases your chance of having to go back for more tests, which means fewer: - \"False-positive\" results (This means that there is an abnormal area but it isn't cancer.) - Invasive testing procedures, such as a biopsy or surgery - Can provide clearer images of the breast if you have dense breast tissue. 3D mammography is an optional exam that anyone can have with a 2D mammogram. It doesn't replace or take the place of a 2D mammogram. 2D mammograms remain an effective screening test for all women.  Not all insurance companies cover the cost of a 3D mammogram. Check with your insurance.            Sep 17, 2018 11:15 AM CDT   (Arrive by 11:00 AM)   PROCEDURE " "with Robert Blakely MD   [facility] ([facility] [place] )    [address]  [place] [address]   [phone]            Oct 01, 2018  2:00 PM CDT   (Arrive by 1:45 PM)   PHYSICAL with Robert Blakely MD   [facility] ([facility] [place] )    [address]  [place] [address]   [phone]              Who to contact     If you have questions or need follow up information about today's clinic visit or your schedule please contact [facility] directly at [phone].  Normal or non-critical lab and imaging results will be communicated to you by MyChart, letter or phone within 4 business days after the clinic has received the results. If you do not hear from us within 7 days, please contact the clinic through MyChart or phone. If you have a critical or abnormal lab result, we will notify you by phone as soon as possible.  Submit refill requests through [employer] or call your pharmacy and they will forward the refill request to us. Please allow 3 business days for your refill to be completed.          Additional Information About Your Visit        Care EveryWhere ID     This is your Care EveryWhere ID. This could be used by other organizations to access your [facility] medical records  [id number]        Your Vitals Were     Pulse Temperature Height Pulse Oximetry BMI (Body Mass Index)       77 98.4  F (36.9  C) 5' 3.5\" (1.613 m) 98% 27.55 kg/m2        Blood Pressure from Last 3 Encounters:   09/10/18 100/66   07/03/18 110/68   04/24/18 111/82    Weight from Last 3 Encounters:   09/10/18 158 lb (71.7 kg)   07/03/18 159 lb (72.1 kg)   04/24/18 158 lb (71.7 kg)              Today, you had the following     No orders found for display         Today's Medication Changes          These changes are accurate as of 9/10/18  3:48 PM.  If you have any questions, ask your nurse or doctor.               Start taking these medicines.        Dose/Directions    " amoxicillin-clavulanate 875-125 MG per tablet   Commonly known as:  AUGMENTIN   Used for:  Diverticulitis of colon   Started by:  Robert Blakely MD        Dose:  1 tablet   Take 1 tablet by mouth 2 times daily   Quantity:  20 tablet   Refills:  0         Stop taking these medicines if you haven't already. Please contact your care team if you have questions.     ILEVRO OP   Stopped by:  Robert Blakely MD                Where to get your medicines      These medications were sent to [facility] - [address] [address]     Phone:  [phone]     amoxicillin-clavulanate 875-125 MG per tablet                Primary Care Provider Office Phone # Fax #    Robert Blakely [phone] [address] [facility] [address]        Equal Access to Services     [facility]: Hadii lamont medina hadasho Sokrystal, waaxda luqadaha, qaybta kaalmada adeegyada, sascha myrick hayregina lindsay . So [facility] [phone].    ATENCIÓN: Si habla español, tiene a bennett disposición servicios gratuitos de asistencia lingüística. [facility] [phone].    We comply with applicable federal civil rights laws and [place] laws. We do not discriminate on the basis of race, color, national origin, age, disability, sex, sexual orientation, or gender identity.            Thank you!     Thank you for choosing [facility]  for your care. Our goal is always to provide you with excellent care. Hearing back from our patients is one way we can continue to improve our services. Please take a few minutes to complete the written survey that you may receive in the mail after your visit with us. Thank you!             Your Updated Medication List - Protect others around you: Learn how to safely use, store and throw away your medicines at www.disposemymeds.org.          This list is accurate as of 9/10/18  3:48 PM.  Always use your most recent med list.                    Brand Name Dispense Instructions for use Diagnosis    albuterol 108 (90 Base) MCG/ACT inhaler    PROAIR HFA/PROVENTIL HFA/VENTOLIN HFA    1 Inhaler    Inhale 2 puffs into the lungs every 6 hours as needed for shortness of breath / dyspnea or wheezing    Reactive airway disease that is not asthma       amoxicillin-clavulanate 875-125 MG per tablet    AUGMENTIN    20 tablet    Take 1 tablet by mouth 2 times daily    Diverticulitis of colon       COLACE PO      Take 100 mg by mouth daily        GLUCOSAMINE SULFATE PO      Take 2,000 mg by mouth daily        METAMUCIL PO           MULTIVITAMIN PO      Take 1 capsule by mouth daily.        nystatin-triamcinolone cream    MYCOLOG II    420 g    APPLY A PEA SIZE AMOUNT TO AFFECTED AREA AT BEDTIME AS DIRECTED    Rash and nonspecific skin eruption

## 2018-09-10 NOTE — NURSING NOTE
"Chief Complaint   Patient presents with     Abdominal Pain       Initial /66  Pulse 77  Temp 98.4  F (36.9  C)  Ht 5' 3.5\" (1.613 m)  Wt 158 lb (71.7 kg)  SpO2 98%  BMI 27.55 kg/m2 Estimated body mass index is 27.55 kg/(m^2) as calculated from the following:    Height as of this encounter: 5' 3.5\" (1.613 m).    Weight as of this encounter: 158 lb (71.7 kg).  Medication Reconciliation: complete    Klaus Gillette LPN  "

## 2018-09-11 ASSESSMENT — ANXIETY QUESTIONNAIRES: GAD7 TOTAL SCORE: 0

## 2018-09-11 ASSESSMENT — PATIENT HEALTH QUESTIONNAIRE - PHQ9: SUM OF ALL RESPONSES TO PHQ QUESTIONS 1-9: 0

## 2018-09-12 NOTE — PROGRESS NOTES
SUBJECTIVE:   Katelyn Manley is a 63 year old female who presents to clinic today for the following health issues:      Lesion removal      Duration: many years    Description (location/character/radiation): left upper thigh    Intensity:  none    Accompanying signs and symptoms: small beige [place], slightly raised. Not painful but never goes away    History (similar episodes/previous evaluation): None    Precipitating or alleviating factors: None    Therapies tried and outcome: None    I seen her in the past and question if small BCC- pt is here for removal.            Subjective: Katelyn Manley a 63 year old female who presents today for lesion removal. The lesion(s) is/are located on the lower legs, number 1 and measures 0.3cm She The patient reports the lesion is itching and denies other significant symptoms on ROS. Medications reviewed.    Pause for the cause has been completed prior to the prceedure.   1. Katelyn was identified by both name and date of birth   2. The correct site was identified   3. Site was marked by provider    4. Written informed consent correct and signed or verbal authorization  to proceed was obtained   5. Verifed necessary supplies, equipment, and diagnostics are available    6. Time out was performed immediately prior to procedure    Objective: The lesion(s) is/are of the above mentioned size and location and is/are typical. The area was prepped and appropriately anesthetized. Using the usual technique, punch biopsy size 6 mm was performed. An appropriate dressing was applied. The procedure was well tolerated and without complications.     Assessment: atypical nevus r/o BCC  Plan: Follow up: Return for suture removal in 10 days.. Wound care instructions provided.  Patient was instructed to be alert for any signs of cutaneous infection.

## 2018-09-14 ENCOUNTER — RADIANT APPOINTMENT (OUTPATIENT)
Dept: MAMMOGRAPHY | Facility: OTHER | Age: 63
End: 2018-09-14
Attending: FAMILY MEDICINE
Payer: COMMERCIAL

## 2018-09-14 DIAGNOSIS — Z12.31 ENCOUNTER FOR SCREENING MAMMOGRAM FOR BREAST CANCER: ICD-10-CM

## 2018-09-14 PROCEDURE — 77067 SCR MAMMO BI INCL CAD: CPT | Mod: TC

## 2018-09-14 PROCEDURE — 77063 BREAST TOMOSYNTHESIS BI: CPT | Mod: TC

## 2018-09-17 ENCOUNTER — OFFICE VISIT (OUTPATIENT)
Dept: FAMILY MEDICINE | Facility: OTHER | Age: 63
End: 2018-09-17
Attending: FAMILY MEDICINE
Payer: COMMERCIAL

## 2018-09-17 VITALS
OXYGEN SATURATION: 98 % | HEART RATE: 75 BPM | SYSTOLIC BLOOD PRESSURE: 118 MMHG | WEIGHT: 159 LBS | TEMPERATURE: 97.3 F | DIASTOLIC BLOOD PRESSURE: 78 MMHG | BODY MASS INDEX: 27.72 KG/M2

## 2018-09-17 DIAGNOSIS — D22.9 ATYPICAL NEVI: Primary | ICD-10-CM

## 2018-09-17 PROCEDURE — 11401 EXC TR-EXT B9+MARG 0.6-1 CM: CPT | Performed by: FAMILY MEDICINE

## 2018-09-17 PROCEDURE — 88305 TISSUE EXAM BY PATHOLOGIST: CPT | Mod: TC | Performed by: FAMILY MEDICINE

## 2018-09-17 ASSESSMENT — PAIN SCALES - GENERAL: PAINLEVEL: NO PAIN (0)

## 2018-09-17 NOTE — MR AVS SNAPSHOT
"              After Visit Summary   9/17/2018    Katelyn Manley    MRN: [id number]           Patient Information     Date Of Birth          1955        Visit Information        Provider Department      9/17/2018 11:15 AM Robert Blakely MD [facility]        Today's Diagnoses     Atypical nevi    -  1      Care Instructions    POST PROCEDURE INSTRUCTIONS    - Remove your dressing in 24 hours.  - Wash incision with soap and water twice daily.  - Keep incision clean and dry   Do NOT soak in water such as a tub bath or swimming   Do NOT do dishes or \"dirty work\"   Do NOT put make-up, deodorant, powders, hairspray, lotions, etc on the incision   - Apply antibiotic ointment twice daily  - Cover with a clean dressing daily or when wet/soiled  - If you have steri-strips, these will fall off on their own in 7 days. If they are still adhered   after 7 days, you may remove them by pulling gently.   - You can use acetaminophen(Tylenol), motrin  or the prescription you received for pain.   No aspirin though.   - If you have any bleeding, cover the wound with clean gauze and hold pressure for 10   Minutes. If the bleeding does not stop or is heavy and profuse, call the clinic or go    To the Urgent Care/Emergency Department.     SIGNS OF INFECTION ARE:    - Redness, swelling, red streaks, pus, drainage, warmth, fever, increased pain, foul    Smell. Contact your primary health car provider if you notice any of the warning    Signs.     FOLLOW - UP    - Follow-up in clinic with Dr. Blakely in 7-14 days for suture removal as directed at the time of the procedure. Pathology results if specimen biopsied will also be discussed at that time.     If you have any questions or concerns call the office at [phone] Ext. [phone] between the hours of 8AM and 4:30PM Monday through Friday.               Follow-ups after your visit        Your next 10 appointments already scheduled     Oct 23, 2018  2:00 PM CDT   (Arrive by " 1:45 PM)   PHYSICAL with Robert Blakely MD   [facility] ([facility] [place] [facility]    [address] [facility] [place] [id number] [phone]              Who to contact     If you have questions or need follow up information about today's clinic visit or your schedule please contact [facility] directly at [phone].  Normal or non-critical lab and imaging results will be communicated to you by MyChart, letter or phone within 4 business days after the clinic has received the results. If you do not hear from us within 7 days, please contact the clinic through MyChart or phone. If you have a critical or abnormal lab result, we will notify you by phone as soon as possible.  Submit refill requests through [employer] or call your pharmacy and they will forward the refill request to us. Please allow 3 business days for your refill to be completed.          Additional Information About Your Visit        Care EveryWhere ID     This is your Care EveryWhere ID. This could be used by other organizations to access your [facility] medical records  [id number]        Your Vitals Were     Pulse Temperature Pulse Oximetry BMI (Body Mass Index)          75 97.3  F (36.3  C) (Tympanic) 98% 27.72 kg/m2         Blood Pressure from Last 3 Encounters:   09/17/18 118/78   09/10/18 100/66   07/03/18 110/68    Weight from Last 3 Encounters:   09/17/18 159 lb (72.1 kg)   09/10/18 158 lb (71.7 kg)   07/03/18 159 lb (72.1 kg)              We Performed the Following     EXC BENIGN SKIN LESION TRUNK/ARM/LEG 0.6-1.0 CM     Surgical pathology exam     SURGICAL TRAY-- MINOR        Primary Care Provider Office Phone # Fax #    Robert Blakely [phone] [address] [facility] [place] [address]        Equal Access to Services     [place]AR[place] : Trena Best, waaxda luqadaha, qaybta kaalmasascha linares. So [facility] [phone].    ATENCIÓN: Si  dallin agrawal, tiene a bennett disposición servicios gratuitos de asistencia lingüística. Sakina mariee [phone].    We comply with applicable federal civil rights laws and [place] laws. We do not discriminate on the basis of race, color, national origin, age, disability, sex, sexual orientation, or gender identity.            Thank you!     Thank you for choosing [facility]  for your care. Our goal is always to provide you with excellent care. Hearing back from our patients is one way we can continue to improve our services. Please take a few minutes to complete the written survey that you may receive in the mail after your visit with us. Thank you!             Your Updated Medication List - Protect others around you: Learn how to safely use, store and throw away your medicines at www.disposemymeds.org.          This list is accurate as of 9/17/18 11:32 AM.  Always use your most recent med list.                   Brand Name Dispense Instructions for use Diagnosis    albuterol 108 (90 Base) MCG/ACT inhaler    PROAIR HFA/PROVENTIL HFA/VENTOLIN HFA    1 Inhaler    Inhale 2 puffs into the lungs every 6 hours as needed for shortness of breath / dyspnea or wheezing    Reactive airway disease that is not asthma       amoxicillin-clavulanate 875-125 MG per tablet    AUGMENTIN    20 tablet    Take 1 tablet by mouth 2 times daily    Diverticulitis of colon       COLACE PO      Take 100 mg by mouth daily        GLUCOSAMINE SULFATE PO      Take 2,000 mg by mouth daily        METAMUCIL PO           MULTIVITAMIN PO      Take 1 capsule by mouth daily.        nystatin-triamcinolone cream    MYCOLOG II    420 g    APPLY A PEA SIZE AMOUNT TO AFFECTED AREA AT BEDTIME AS DIRECTED    Rash and nonspecific skin eruption

## 2018-09-17 NOTE — NURSING NOTE
"Chief Complaint   Patient presents with     Lesion Removal       Initial /78  Pulse 75  Temp 97.3  F (36.3  C) (Tympanic)  Wt 159 lb (72.1 kg)  SpO2 98%  BMI 27.72 kg/m2 Estimated body mass index is 27.72 kg/(m^2) as calculated from the following:    Height as of 9/10/18: 5' 3.5\" (1.613 m).    Weight as of this encounter: 159 lb (72.1 kg).  Medication Reconciliation: complete    Desiree Banerjee MA    "

## 2018-09-17 NOTE — PATIENT INSTRUCTIONS
"POST PROCEDURE INSTRUCTIONS    - Remove your dressing in 24 hours.  - Wash incision with soap and water twice daily.  - Keep incision clean and dry   Do NOT soak in water such as a tub bath or swimming   Do NOT do dishes or \"dirty work\"   Do NOT put make-up, deodorant, powders, hairspray, lotions, etc on the incision   - Apply antibiotic ointment twice daily  - Cover with a clean dressing daily or when wet/soiled  - If you have steri-strips, these will fall off on their own in 7 days. If they are still adhered   after 7 days, you may remove them by pulling gently.   - You can use acetaminophen(Tylenol), motrin  or the prescription you received for pain.   No aspirin though.   - If you have any bleeding, cover the wound with clean gauze and hold pressure for 10   Minutes. If the bleeding does not stop or is heavy and profuse, call the clinic or go    To the Urgent Care/Emergency Department.     SIGNS OF INFECTION ARE:    - Redness, swelling, red streaks, pus, drainage, warmth, fever, increased pain, foul    Smell. Contact your primary health car provider if you notice any of the warning    Signs.     FOLLOW - UP    - Follow-up in clinic with Dr. Blakely in 7-14 days for suture removal as directed at the time of the procedure. Pathology results if specimen biopsied will also be discussed at that time.     If you have any questions or concerns call the office at [phone] Ext. [phone] between the hours of 8AM and 4:30PM Monday through Friday.       "

## 2018-09-19 LAB — COPATH REPORT: NORMAL

## 2018-10-17 NOTE — PROGRESS NOTES
SUBJECTIVE:   CC: Katelyn Manley is an 63 year old woman who presents for preventive health visit.     Healthy Habits:    Do you get at least three servings of calcium containing foods daily (dairy, green leafy vegetables, etc.)? yes    Amount of exercise or daily activities, outside of work: 2 day(s) per week    Problems taking medications regularly No    Medication side effects: No    Have you had an eye exam in the past two years? yes    Do you see a dentist twice per year? yes    Do you have sleep apnea, excessive snoring or daytime drowsiness?no      Hyperlipidemia Follow-Up      Rate your low fat/cholesterol diet?: not monitoring fat    Taking statin?  No    Other lipid medications/supplements?:  none      Today's PHQ-2 Score:   PHQ-2 ( 1999 Pf[employer]) 10/17/2013 5/6/2013   Q1: Little interest or pleasure in doing things 0 0   Q2: Feeling down, depressed or hopeless 0 -   PHQ-2 Score 0 -     PHQ-9 SCORE 7/3/2018 9/10/2018 10/23/2018   Total Score - - -   Total Score 0 0 0     PENELOPE-7 SCORE 7/3/2018 9/10/2018 10/23/2018   Total Score 1 0 0           Abuse: Current or Past(Physical, Sexual or Emotional)- No  Do you feel safe in your environment - Yes    Social History   Substance Use Topics     Smoking status: Never Smoker     Smokeless tobacco: Never Used     Alcohol use No     If you drink alcohol do you typically have >3 drinks per day or >7 drinks per week? No                     Reviewed orders with patient.  Reviewed health maintenance and updated orders accordingly - Yes  Labs reviewed in [facility]    Patient over age 50, mutual decision to screen reflected in health maintenance.    Pertinent mammograms are reviewed under the imaging tab.  History of abnormal Pap smear: NO - age 30-65 PAP every 5 years with negative HPV co-testing recommended  PAP / HPV 5/14/2014   PAP NIL   HPV HIGH RISK DNA PROBE Canceled, Test credited   Unsatisfactory specimen - leaked in transit, QNS for analysis       Reviewed and updated  as needed this visit by clinical staff         Reviewed and updated as needed this visit by Provider        Past Medical History:   Diagnosis Date     Cyst of Bartholin's gland 10/22/2007     Diverticulitis of sigmoid colon 8/23/2011     Endometrial hyperplasia without atypia, simple      Glucose intolerance (impaired glucose tolerance) 790.22     Hypercholesterolemia 8/23/2011     Menopause 5/6/2013     Osteoarthrosis, unspecified whether generalized or localized, lower leg 7/9/2002     S/P vaginal hysterectomy 2/19/2015     Special screening for malignant neoplasms, colon 10/15/2004     Status post total knee replacement 8/23/2011      Past Surgical History:   Procedure Laterality Date     COLONOSCOPY  2004     COLONOSCOPY  2-    Dr Sanchez/ sigmoid diverticular dz     COLPORRHAPHY POSTERIOR N/A 2/18/2015    Procedure: COLPORRHAPHY POSTERIOR;  Surgeon: Vasquez Sauer MD;  [facility]: [facility]     CYSTOSCOPY N/A 2/18/2015    Procedure: CYSTOSCOPY;  Surgeon: Vasquez Sauer MD;  [facility]     DILATION AND CURETTAGE, HYSTEROSCOPY DIAGNOSTIC, COMBINED  5/7/2014    Procedure: COMBINED DILATION AND CURETTAGE, HYSTEROSCOPY DIAGNOSTIC;  Surgeon: Vasquez Sauer MD;  Location: [facility]     EYE SURGERY Left 08/2016    macular hole repair     HYSTERECTOMY VAGINAL, BILATERAL SALPINGO-OOPHERECTOMY, COMBINED N/A 2/18/2015    Procedure: COMBINED HYSTERECTOMY VAGINAL, SALPINGO-OOPHORECTOMY;  Surgeon: Vasquez Sauer MD;  Location[facility]     JOINT REPLACEMENT  unijoint    right     JOINT REPLACEMENT  full joint    x2 - left     PHACOEMULSIFICATION WITH STANDARD INTRAOCULAR LENS IMPLANT Left 4/24/2018    Procedure: PHACOEMULSIFICATION WITH STANDARD INTRAOCULAR LENS IMPLANT;  PHACOEMULSIFICATION CATARACT EXTRACTION POSTERIOR CHAMBER LENS LEFT/TECNIS-TORIC, 10% CORINA;  Surgeon: Jerome Snow MD;  Location: [facility]     SLING TRANSOBTURATOR N/A 2/18/2015    Procedure: SLING TRANSOBTURATOR;  Surgeon: Vasquez Sauer MD;  Location: [facility]        ROS:  CONSTITUTIONAL: NEGATIVE for fever, chills, change in weight  INTEGUMENTARY/SKIN: NEGATIVE for worrisome rashes, moles or lesions  EYES: NEGATIVE for vision changes or irritation  ENT: NEGATIVE for ear, mouth and throat problems  RESP: NEGATIVE for significant cough or SOB  BREAST: NEGATIVE for masses, tenderness or discharge  CV: NEGATIVE for chest pain, palpitations or peripheral edema  GI: NEGATIVE for nausea, abdominal pain, heartburn, or change in bowel habits  : NEGATIVE for unusual urinary or vaginal symptoms. No vaginal bleeding.  MUSCULOSKELETAL: NEGATIVE for significant arthralgias or myalgia  NEURO: NEGATIVE for weakness, dizziness or paresthesias  PSYCHIATRIC: NEGATIVE for changes in mood or affect     OBJECTIVE:   /79 (Patient Position: Sitting)  Pulse 73  Wt 156 lb (70.8 kg)  SpO2 97%  BMI 27.2 kg/m2  EXAM:      Results for orders placed or performed in visit on 10/23/18 (from the past 24 hour(s))   Lipid Profile (Chol, Trig, HDL, LDL calc)   Result Value Ref Range    Cholesterol 205 (H) <200 mg/dL    Triglycerides 154 (H) <150 mg/dL    HDL Cholesterol 46 (L) >49 mg/dL    LDL Cholesterol Calculated 128 (H) <100 mg/dL    Non HDL Cholesterol 159 (H) <130 mg/dL   CBC with platelets and differential   Result Value Ref Range    WBC 8.2 4.0 - 11.0 10e9/L    RBC Count 4.54 3.8 - 5.2 10e12/L    Hemoglobin 13.2 11.7 - 15.7 g/dL    Hematocrit 38.8 35.0 - 47.0 %    MCV 86 78 - 100 fl    MCH 29.1 26.5 - 33.0 pg    MCHC 34.0 31.5 - 36.5 g/dL    RDW 13.3 10.0 - 15.0 %    Platelet Count 290 150 - 450 10e9/L    Diff Method Automated Method     % Neutrophils 42.6 %    % Lymphocytes 44.0 %    % Monocytes 8.1 %    % Eosinophils 4.5 %    % Basophils 0.7 %    % Immature Granulocytes 0.1 %    Nucleated RBCs 0 0 /100    Absolute Neutrophil 3.5 1.6 - 8.3 10e9/L    Absolute Lymphocytes 3.6 0.8 - 5.3 10e9/L    Absolute Monocytes 0.7 0.0 - 1.3 10e9/L    Absolute Eosinophils 0.4 0.0 - 0.7 10e9/L     Absolute Basophils 0.1 0.0 - 0.2 10e9/L    Abs Immature Granulocytes 0.0 0 - 0.4 10e9/L    Absolute Nucleated RBC 0.0    Comprehensive metabolic panel   Result Value Ref Range    Sodium 137 133 - 144 mmol/L    Potassium 4.2 3.4 - 5.3 mmol/L    Chloride 107 94 - 109 mmol/L    Carbon Dioxide 22 20 - 32 mmol/L    Anion Gap 8 3 - 14 mmol/L    Glucose 99 70 - 99 mg/dL    Urea Nitrogen 23 7 - 30 mg/dL    Creatinine 1.00 0.52 - 1.04 mg/dL    GFR Estimate 56 (L) >60 mL/min/1.7m2    GFR Estimate If Black 67 >60 mL/min/1.7m2    Calcium 8.9 8.5 - 10.1 mg/dL    Bilirubin Total 0.4 0.2 - 1.3 mg/dL    Albumin 3.6 3.4 - 5.0 g/dL    Protein Total 8.1 6.8 - 8.8 g/dL    Alkaline Phosphatase 88 40 - 150 U/L    ALT 25 0 - 50 U/L    AST 23 0 - 45 U/L         ASSESSMENT/PLAN:   1. Routine history and physical examination of adult  Doing well - very active   - Lipid Profile (Chol, Trig, HDL, LDL calc); Future  - CBC with platelets and differential; Future  - Comprehensive metabolic panel; Future  - Hepatitis C antibody; Future  - HIV Antigen Antibody Combo; Future  - FLU VAC, QUADRIVALENT (RIV4) RECOMBINANT DNA, IM    2. Dyslipidemia  Mild - low risk - will watch and no meds recommended   - Lipid Profile (Chol, Trig, HDL, LDL calc); Future  - CBC with platelets and differential; Future  - Comprehensive metabolic panel; Future  - aspirin 81 MG chewable tablet; Take 1 tablet (81 mg) by mouth daily  Dispense: 108 tablet; Refill: 3  - OFFICE/OUTPT VISIT,EST,LEVL III    3. Diverticulosis of large intestine without hemorrhage  Still issues with constipation - discussed. Mor metamucil and fiber/fluid   - OFFICE/OUTPT VISIT,EST,LEVL III    4. H/O diverticulitis of colon  Resolved   - OFFICE/OUTPT VISIT,EST,LEVL III    5. Pre-diabetes  Doing welll with diet   - OFFICE/OUTPT VISIT,EST,LEVL III    6. Need for prophylactic vaccination and inoculation against influenza  Shot given   - Vaccine Administration, Initial [00149]    7. Menopause  Will order  "DEXA.  - DX Hip/Pelvis/Spine; Future  - OFFICE/OUTPT VISIT,SUELEVBRYNN III    COUNSELING:   Reviewed preventive health counseling, as reflected in patient instructions       Regular exercise       Healthy diet/nutrition    BP Readings from Last 1 Encounters:   09/17/18 118/78     Estimated body mass index is 27.72 kg/(m^2) as calculated from the following:    Height as of 9/10/18: 5' 3.5\" (1.613 m).    Weight as of 9/17/18: 159 lb (72.1 kg).           reports that she has never smoked. She has never used smokeless tobacco.      Counseling Resources:  ATP IV Guidelines  Pooled Cohorts Equation Calculator  Breast Cancer Risk Calculator  FRAX Risk Assessment  ICSI Preventive Guidelines  Dietary Guidelines for Americans, 2010  USDA's MyPlate  ASA Prophylaxis  Lung CA Screening    Robert Blakely MD  [facility] [place]  "

## 2018-10-17 NOTE — PATIENT INSTRUCTIONS
Preventive Health Recommendations  Female Ages 50 - 64    Yearly exam: See your health care provider every year in order to  o Review health changes.   o Discuss preventive care.    o Review your medicines if your doctor has prescribed any.      Get a Pap test every three years (unless you have an abnormal result and your provider advises testing more often).    If you get Pap tests with HPV test, you only need to test every 5 years, unless you have an abnormal result.     You do not need a Pap test if your uterus was removed (hysterectomy) and you have not had cancer.    You should be tested each year for STDs (sexually transmitted diseases) if you're at risk.     Have a mammogram every 1 to 2 years.    Have a colonoscopy at age 50, or have a yearly FIT test (stool test). These exams screen for colon cancer.      Have a cholesterol test every 5 years, or more often if advised.    Have a diabetes test (fasting glucose) every three years. If you are at risk for diabetes, you should have this test more often.     If you are at risk for osteoporosis (brittle bone disease), think about having a bone density scan (DEXA).    Shots: Get a flu shot each year. Get a tetanus shot every 10 years.    Nutrition:     Eat at least 5 servings of fruits and vegetables each day.    Eat whole-grain bread, whole-wheat pasta and brown rice instead of white grains and rice.    Get adequate Calcium and Vitamin D.     Lifestyle    Exercise at least 150 minutes a week (30 minutes a day, 5 days a week). This will help you control your weight and prevent disease.    Limit alcohol to one drink per day.    No smoking.     Wear sunscreen to prevent skin cancer.     See your dentist every six months for an exam and cleaning.    See your eye doctor every 1 to 2 years.  Results for orders placed or performed in visit on 10/23/18 (from the past 24 hour(s))   Lipid Profile (Chol, Trig, HDL, LDL calc)   Result Value Ref Range    Cholesterol 205 (H)  <200 mg/dL    Triglycerides 154 (H) <150 mg/dL    HDL Cholesterol 46 (L) >49 mg/dL    LDL Cholesterol Calculated 128 (H) <100 mg/dL    Non HDL Cholesterol 159 (H) <130 mg/dL   CBC with platelets and differential   Result Value Ref Range    WBC 8.2 4.0 - 11.0 10e9/L    RBC Count 4.54 3.8 - 5.2 10e12/L    Hemoglobin 13.2 11.7 - 15.7 g/dL    Hematocrit 38.8 35.0 - 47.0 %    MCV 86 78 - 100 fl    MCH 29.1 26.5 - 33.0 pg    MCHC 34.0 31.5 - 36.5 g/dL    RDW 13.3 10.0 - 15.0 %    Platelet Count 290 150 - 450 10e9/L    Diff Method Automated Method     % Neutrophils 42.6 %    % Lymphocytes 44.0 %    % Monocytes 8.1 %    % Eosinophils 4.5 %    % Basophils 0.7 %    % Immature Granulocytes 0.1 %    Nucleated RBCs 0 0 /100    Absolute Neutrophil 3.5 1.6 - 8.3 10e9/L    Absolute Lymphocytes 3.6 0.8 - 5.3 10e9/L    Absolute Monocytes 0.7 0.0 - 1.3 10e9/L    Absolute Eosinophils 0.4 0.0 - 0.7 10e9/L    Absolute Basophils 0.1 0.0 - 0.2 10e9/L    Abs Immature Granulocytes 0.0 0 - 0.4 10e9/L    Absolute Nucleated RBC 0.0    Comprehensive metabolic panel   Result Value Ref Range    Sodium 137 133 - 144 mmol/L    Potassium 4.2 3.4 - 5.3 mmol/L    Chloride 107 94 - 109 mmol/L    Carbon Dioxide 22 20 - 32 mmol/L    Anion Gap 8 3 - 14 mmol/L    Glucose 99 70 - 99 mg/dL    Urea Nitrogen 23 7 - 30 mg/dL    Creatinine 1.00 0.52 - 1.04 mg/dL    GFR Estimate 56 (L) >60 mL/min/1.7m2    GFR Estimate If Black 67 >60 mL/min/1.7m2    Calcium 8.9 8.5 - 10.1 mg/dL    Bilirubin Total 0.4 0.2 - 1.3 mg/dL    Albumin 3.6 3.4 - 5.0 g/dL    Protein Total 8.1 6.8 - 8.8 g/dL    Alkaline Phosphatase 88 40 - 150 U/L    ALT 25 0 - 50 U/L    AST 23 0 - 45 U/L

## 2018-10-22 ENCOUNTER — TELEPHONE (OUTPATIENT)
Dept: FAMILY MEDICINE | Facility: OTHER | Age: 63
End: 2018-10-22

## 2018-10-22 NOTE — TELEPHONE ENCOUNTER
2:03 PM    Reason for Call: Phone Call    Description: Pt called and states that she would like a call back regarding some labs she needs to come and do before her physical gloria tomorrow. She would like a call back when these are put in.    Was an appointment offered for this call? No  If yes : Appointment type              Date    Preferred method for responding to this message: Telephone Call  What is your phone number ?[phone]    If we cannot reach you directly, may we leave a detailed response at the number you provided? Yes    Can this message wait until your PCP/provider returns, if available today? Not applicable, pcp is in     [facility]

## 2018-10-23 ENCOUNTER — OFFICE VISIT (OUTPATIENT)
Dept: FAMILY MEDICINE | Facility: OTHER | Age: 63
End: 2018-10-23
Attending: FAMILY MEDICINE
Payer: COMMERCIAL

## 2018-10-23 VITALS
WEIGHT: 156 LBS | SYSTOLIC BLOOD PRESSURE: 131 MMHG | HEART RATE: 73 BPM | OXYGEN SATURATION: 97 % | DIASTOLIC BLOOD PRESSURE: 79 MMHG | BODY MASS INDEX: 27.2 KG/M2

## 2018-10-23 DIAGNOSIS — Z23 NEED FOR PROPHYLACTIC VACCINATION AND INOCULATION AGAINST INFLUENZA: ICD-10-CM

## 2018-10-23 DIAGNOSIS — Z00.00 ROUTINE HISTORY AND PHYSICAL EXAMINATION OF ADULT: Primary | ICD-10-CM

## 2018-10-23 DIAGNOSIS — K57.30 DIVERTICULOSIS OF LARGE INTESTINE WITHOUT HEMORRHAGE: ICD-10-CM

## 2018-10-23 DIAGNOSIS — Z00.00 ROUTINE HISTORY AND PHYSICAL EXAMINATION OF ADULT: ICD-10-CM

## 2018-10-23 DIAGNOSIS — E78.5 DYSLIPIDEMIA: ICD-10-CM

## 2018-10-23 DIAGNOSIS — R73.03 PRE-DIABETES: ICD-10-CM

## 2018-10-23 DIAGNOSIS — Z78.0 MENOPAUSE: ICD-10-CM

## 2018-10-23 DIAGNOSIS — Z87.19 H/O DIVERTICULITIS OF COLON: ICD-10-CM

## 2018-10-23 LAB
ALBUMIN SERPL-MCNC: 3.6 G/DL (ref 3.4–5)
ALP SERPL-CCNC: 88 U/L (ref 40–150)
ALT SERPL W P-5'-P-CCNC: 25 U/L (ref 0–50)
ANION GAP SERPL CALCULATED.3IONS-SCNC: 8 MMOL/L (ref 3–14)
AST SERPL W P-5'-P-CCNC: 23 U/L (ref 0–45)
BASOPHILS # BLD AUTO: 0.1 10E9/L (ref 0–0.2)
BASOPHILS NFR BLD AUTO: 0.7 %
BILIRUB SERPL-MCNC: 0.4 MG/DL (ref 0.2–1.3)
BUN SERPL-MCNC: 23 MG/DL (ref 7–30)
CALCIUM SERPL-MCNC: 8.9 MG/DL (ref 8.5–10.1)
CHLORIDE SERPL-SCNC: 107 MMOL/L (ref 94–109)
CHOLEST SERPL-MCNC: 205 MG/DL
CO2 SERPL-SCNC: 22 MMOL/L (ref 20–32)
CREAT SERPL-MCNC: 1 MG/DL (ref 0.52–1.04)
DIFFERENTIAL METHOD BLD: NORMAL
EOSINOPHIL # BLD AUTO: 0.4 10E9/L (ref 0–0.7)
EOSINOPHIL NFR BLD AUTO: 4.5 %
ERYTHROCYTE [DISTWIDTH] IN BLOOD BY AUTOMATED COUNT: 13.3 % (ref 10–15)
GFR SERPL CREATININE-BSD FRML MDRD: 56 ML/MIN/1.7M2
GLUCOSE SERPL-MCNC: 99 MG/DL (ref 70–99)
HCT VFR BLD AUTO: 38.8 % (ref 35–47)
HDLC SERPL-MCNC: 46 MG/DL
HGB BLD-MCNC: 13.2 G/DL (ref 11.7–15.7)
IMM GRANULOCYTES # BLD: 0 10E9/L (ref 0–0.4)
IMM GRANULOCYTES NFR BLD: 0.1 %
LDLC SERPL CALC-MCNC: 128 MG/DL
LYMPHOCYTES # BLD AUTO: 3.6 10E9/L (ref 0.8–5.3)
LYMPHOCYTES NFR BLD AUTO: 44 %
MCH RBC QN AUTO: 29.1 PG (ref 26.5–33)
MCHC RBC AUTO-ENTMCNC: 34 G/DL (ref 31.5–36.5)
MCV RBC AUTO: 86 FL (ref 78–100)
MONOCYTES # BLD AUTO: 0.7 10E9/L (ref 0–1.3)
MONOCYTES NFR BLD AUTO: 8.1 %
NEUTROPHILS # BLD AUTO: 3.5 10E9/L (ref 1.6–8.3)
NEUTROPHILS NFR BLD AUTO: 42.6 %
NONHDLC SERPL-MCNC: 159 MG/DL
NRBC # BLD AUTO: 0 10*3/UL
NRBC BLD AUTO-RTO: 0 /100
PLATELET # BLD AUTO: 290 10E9/L (ref 150–450)
POTASSIUM SERPL-SCNC: 4.2 MMOL/L (ref 3.4–5.3)
PROT SERPL-MCNC: 8.1 G/DL (ref 6.8–8.8)
RBC # BLD AUTO: 4.54 10E12/L (ref 3.8–5.2)
SODIUM SERPL-SCNC: 137 MMOL/L (ref 133–144)
TRIGL SERPL-MCNC: 154 MG/DL
WBC # BLD AUTO: 8.2 10E9/L (ref 4–11)

## 2018-10-23 PROCEDURE — 90471 IMMUNIZATION ADMIN: CPT | Performed by: FAMILY MEDICINE

## 2018-10-23 PROCEDURE — 36415 COLL VENOUS BLD VENIPUNCTURE: CPT | Performed by: FAMILY MEDICINE

## 2018-10-23 PROCEDURE — 99396 PREV VISIT EST AGE 40-64: CPT | Mod: 25 | Performed by: FAMILY MEDICINE

## 2018-10-23 PROCEDURE — 80053 COMPREHEN METABOLIC PANEL: CPT | Performed by: FAMILY MEDICINE

## 2018-10-23 PROCEDURE — 86803 HEPATITIS C AB TEST: CPT | Mod: 90 | Performed by: FAMILY MEDICINE

## 2018-10-23 PROCEDURE — 87389 HIV-1 AG W/HIV-1&-2 AB AG IA: CPT | Mod: 90 | Performed by: FAMILY MEDICINE

## 2018-10-23 PROCEDURE — 80061 LIPID PANEL: CPT | Performed by: FAMILY MEDICINE

## 2018-10-23 PROCEDURE — 99000 SPECIMEN HANDLING OFFICE-LAB: CPT | Performed by: FAMILY MEDICINE

## 2018-10-23 PROCEDURE — 90682 RIV4 VACC RECOMBINANT DNA IM: CPT | Performed by: FAMILY MEDICINE

## 2018-10-23 PROCEDURE — 85025 COMPLETE CBC W/AUTO DIFF WBC: CPT | Performed by: FAMILY MEDICINE

## 2018-10-23 RX ORDER — DIMENHYDRINATE 50 MG
1 TABLET ORAL DAILY
COMMUNITY
End: 2019-05-20

## 2018-10-23 RX ORDER — ASPIRIN 81 MG/1
81 TABLET, CHEWABLE ORAL DAILY
Qty: 108 TABLET | Refills: 3 | COMMUNITY
Start: 2018-10-23 | End: 2022-11-08

## 2018-10-23 ASSESSMENT — ANXIETY QUESTIONNAIRES
6. BECOMING EASILY ANNOYED OR IRRITABLE: NOT AT ALL
1. FEELING NERVOUS, ANXIOUS, OR ON EDGE: NOT AT ALL
5. BEING SO RESTLESS THAT IT IS HARD TO SIT STILL: NOT AT ALL
GAD7 TOTAL SCORE: 0
7. FEELING AFRAID AS IF SOMETHING AWFUL MIGHT HAPPEN: NOT AT ALL
3. WORRYING TOO MUCH ABOUT DIFFERENT THINGS: NOT AT ALL
4. TROUBLE RELAXING: NOT AT ALL
2. NOT BEING ABLE TO STOP OR CONTROL WORRYING: NOT AT ALL

## 2018-10-23 ASSESSMENT — PAIN SCALES - GENERAL: PAINLEVEL: NO PAIN (0)

## 2018-10-23 NOTE — MR AVS SNAPSHOT
After Visit Summary   10/23/2018    Katelyn Manley    MRN: [id number]           Patient Information     Date Of Birth          1955        Visit Information        Provider Department      10/23/2018 2:00 PM Robert Blakely MD [facility]        Today's Diagnoses     Routine history and physical examination of adult    -  1    Dyslipidemia        Diverticulosis of large intestine without hemorrhage        H/O diverticulitis of colon        Pre-diabetes        Need for prophylactic vaccination and inoculation against influenza        Menopause          Care Instructions      Preventive Health Recommendations  Female Ages 50 - 64    Yearly exam: See your health care provider every year in order to  o Review health changes.   o Discuss preventive care.    o Review your medicines if your doctor has prescribed any.      Get a Pap test every three years (unless you have an abnormal result and your provider advises testing more often).    If you get Pap tests with HPV test, you only need to test every 5 years, unless you have an abnormal result.     You do not need a Pap test if your uterus was removed (hysterectomy) and you have not had cancer.    You should be tested each year for STDs (sexually transmitted diseases) if you're at risk.     Have a mammogram every 1 to 2 years.    Have a colonoscopy at age 50, or have a yearly FIT test (stool test). These exams screen for colon cancer.      Have a cholesterol test every 5 years, or more often if advised.    Have a diabetes test (fasting glucose) every three years. If you are at risk for diabetes, you should have this test more often.     If you are at risk for osteoporosis (brittle bone disease), think about having a bone density scan (DEXA).    Shots: Get a flu shot each year. Get a tetanus shot every 10 years.    Nutrition:     Eat at least 5 servings of fruits and vegetables each day.    Eat whole-grain bread, whole-wheat pasta  and brown rice instead of white grains and rice.    Get adequate Calcium and Vitamin D.     Lifestyle    Exercise at least 150 minutes a week (30 minutes a day, 5 days a week). This will help you control your weight and prevent disease.    Limit alcohol to one drink per day.    No smoking.     Wear sunscreen to prevent skin cancer.     See your dentist every six months for an exam and cleaning.    See your eye doctor every 1 to 2 years.  Results for orders placed or performed in visit on 10/23/18 (from the past 24 hour(s))   Lipid Profile (Chol, Trig, HDL, LDL calc)   Result Value Ref Range    Cholesterol 205 (H) <200 mg/dL    Triglycerides 154 (H) <150 mg/dL    HDL Cholesterol 46 (L) >49 mg/dL    LDL Cholesterol Calculated 128 (H) <100 mg/dL    Non HDL Cholesterol 159 (H) <130 mg/dL   CBC with platelets and differential   Result Value Ref Range    WBC 8.2 4.0 - 11.0 10e9/L    RBC Count 4.54 3.8 - 5.2 10e12/L    Hemoglobin 13.2 11.7 - 15.7 g/dL    Hematocrit 38.8 35.0 - 47.0 %    MCV 86 78 - 100 fl    MCH 29.1 26.5 - 33.0 pg    MCHC 34.0 31.5 - 36.5 g/dL    RDW 13.3 10.0 - 15.0 %    Platelet Count 290 150 - 450 10e9/L    Diff Method Automated Method     % Neutrophils 42.6 %    % Lymphocytes 44.0 %    % Monocytes 8.1 %    % Eosinophils 4.5 %    % Basophils 0.7 %    % Immature Granulocytes 0.1 %    Nucleated RBCs 0 0 /100    Absolute Neutrophil 3.5 1.6 - 8.3 10e9/L    Absolute Lymphocytes 3.6 0.8 - 5.3 10e9/L    Absolute Monocytes 0.7 0.0 - 1.3 10e9/L    Absolute Eosinophils 0.4 0.0 - 0.7 10e9/L    Absolute Basophils 0.1 0.0 - 0.2 10e9/L    Abs Immature Granulocytes 0.0 0 - 0.4 10e9/L    Absolute Nucleated RBC 0.0    Comprehensive metabolic panel   Result Value Ref Range    Sodium 137 133 - 144 mmol/L    Potassium 4.2 3.4 - 5.3 mmol/L    Chloride 107 94 - 109 mmol/L    Carbon Dioxide 22 20 - 32 mmol/L    Anion Gap 8 3 - 14 mmol/L    Glucose 99 70 - 99 mg/dL    Urea Nitrogen 23 7 - 30 mg/dL    Creatinine 1.00 0.52 -  1.04 mg/dL    GFR Estimate 56 (L) >60 mL/min/1.7m2    GFR Estimate If Black 67 >60 mL/min/1.7m2    Calcium 8.9 8.5 - 10.1 mg/dL    Bilirubin Total 0.4 0.2 - 1.3 mg/dL    Albumin 3.6 3.4 - 5.0 g/dL    Protein Total 8.1 6.8 - 8.8 g/dL    Alkaline Phosphatase 88 40 - 150 U/L    ALT 25 0 - 50 U/L    AST 23 0 - 45 U/L               Follow-ups after your visit        Future tests that were ordered for you today     Open Future Orders        Priority Expected Expires Ordered    DX Hip/Pelvis/Spine Routine  10/23/2019 10/23/2018            Who to contact     If you have questions or need follow up information about today's clinic visit or your schedule please contact [facility] directly at [phone].  Normal or non-critical lab and imaging results will be communicated to you by MyChart, letter or phone within 4 business days after the clinic has received the results. If you do not hear from us within 7 days, please contact the clinic through MyChart or phone. If you have a critical or abnormal lab result, we will notify you by phone as soon as possible.  Submit refill requests through [employer] or call your pharmacy and they will forward the refill request to us. Please allow 3 business days for your refill to be completed.          Additional Information About Your Visit        Care EveryWhere ID     This is your Care EveryWhere ID. This could be used by other organizations to access your [place] medical records  [id number]        Your Vitals Were     Pulse Pulse Oximetry BMI (Body Mass Index)             73 97% 27.2 kg/m2          Blood Pressure from Last 3 Encounters:   10/23/18 131/79   09/17/18 118/78   09/10/18 100/66    Weight from Last 3 Encounters:   10/23/18 156 lb (70.8 kg)   09/17/18 159 lb (72.1 kg)   09/10/18 158 lb (71.7 kg)              We Performed the Following     FLU VAC, QUADRIVALENT (RIV4) RECOMBINANT DNA, IM     Vaccine Administration, Initial [32516]          Today's  Medication Changes          These changes are accurate as of 10/23/18  2:27 PM.  If you have any questions, ask your nurse or doctor.               Stop taking these medicines if you haven't already. Please contact your care team if you have questions.     amoxicillin-clavulanate 875-125 MG per tablet   Commonly known as:  AUGMENTIN   Stopped by:  Robert Blakely MD                    Primary Care Provider Office Phone # Fax #    Robert Blakely [phone] [address] [address]        Equal Access to Services     [facility]: Hadii aad ku hadasho Soomaali, waaxda luqadaha, qaybta kaalmada adeegyada, waxay ramez hayregina lindsay . So [facility] [phone].    ATENCIÓN: Si habla español, tiene a bennett disposición servicios gratuitos de asistencia lingüística. [facility] [phone].    We comply with applicable federal civil rights laws and [place] laws. We do not discriminate on the basis of race, color, national origin, age, disability, sex, sexual orientation, or gender identity.            Thank you!     Thank you for choosing [facility]  for your care. Our goal is always to provide you with excellent care. Hearing back from our patients is one way we can continue to improve our services. Please take a few minutes to complete the written survey that you may receive in the mail after your visit with us. Thank you!             Your Updated Medication List - Protect others around you: Learn how to safely use, store and throw away your medicines at www.disposemymeds.org.          This list is accurate as of 10/23/18  2:27 PM.  Always use your most recent med list.                   Brand Name Dispense Instructions for use Diagnosis    albuterol 108 (90 Base) MCG/ACT inhaler    PROAIR HFA/PROVENTIL HFA/VENTOLIN HFA    1 Inhaler    Inhale 2 puffs into the lungs every 6 hours as needed for shortness of breath / dyspnea or wheezing    Reactive airway  disease that is not asthma       aspirin 81 MG chewable tablet     108 tablet    Take 1 tablet (81 mg) by mouth daily    Dyslipidemia       COLACE PO      Take 100 mg by mouth daily        flax seed oil 1000 MG capsule      Take 1 capsule by mouth daily        GLUCOSAMINE SULFATE PO      Take 2,000 mg by mouth daily        METAMUCIL PO           MULTIVITAMIN PO      Take 1 capsule by mouth daily.        nystatin-triamcinolone cream    MYCOLOG II    420 g    APPLY A PEA SIZE AMOUNT TO AFFECTED AREA AT BEDTIME AS DIRECTED    Rash and nonspecific skin eruption

## 2018-10-23 NOTE — PROGRESS NOTES

## 2018-10-23 NOTE — NURSING NOTE
"Chief Complaint   Patient presents with     Physical       Initial /79 (Patient Position: Sitting)  Pulse 73  Wt 156 lb (70.8 kg)  SpO2 97%  BMI 27.2 kg/m2 Estimated body mass index is 27.2 kg/(m^2) as calculated from the following:    Height as of 9/10/18: 5' 3.5\" (1.613 m).    Weight as of this encounter: 156 lb (70.8 kg).  Medication Reconciliation: complete    Mya Prajapati LPN  "

## 2018-10-24 LAB
HCV AB SERPL QL IA: NONREACTIVE
HIV 1+2 AB+HIV1 P24 AG SERPL QL IA: NONREACTIVE

## 2018-10-24 ASSESSMENT — ANXIETY QUESTIONNAIRES: GAD7 TOTAL SCORE: 0

## 2018-10-24 ASSESSMENT — PATIENT HEALTH QUESTIONNAIRE - PHQ9: SUM OF ALL RESPONSES TO PHQ QUESTIONS 1-9: 0

## 2018-10-30 ENCOUNTER — HOSPITAL ENCOUNTER (OUTPATIENT)
Dept: BONE DENSITY | Facility: HOSPITAL | Age: 63
Discharge: HOME | End: 2018-10-30
Attending: FAMILY MEDICINE | Admitting: FAMILY MEDICINE
Payer: COMMERCIAL

## 2018-10-30 DIAGNOSIS — Z78.0 MENOPAUSE: ICD-10-CM

## 2018-10-30 PROCEDURE — 77080 DXA BONE DENSITY AXIAL: CPT | Mod: TC

## 2018-11-21 DIAGNOSIS — J98.9 REACTIVE AIRWAY DISEASE THAT IS NOT ASTHMA: ICD-10-CM

## 2018-11-23 RX ORDER — ALBUTEROL SULFATE 90 UG/1
2 AEROSOL, METERED RESPIRATORY (INHALATION) EVERY 6 HOURS PRN
Qty: 1 INHALER | Refills: 1 | Status: SHIPPED | OUTPATIENT
Start: 2018-11-23 | End: 2023-03-16

## 2018-11-23 NOTE — TELEPHONE ENCOUNTER
"Pt called and stated she received an albuterol sample, pt reports coughing and feeling wheezy hoping to get an albuterol inhaler.  Pt reports she has \"touch of asthma, exercise and cold induced.\"   "

## 2018-12-11 ENCOUNTER — OFFICE VISIT (OUTPATIENT)
Dept: FAMILY MEDICINE | Facility: OTHER | Age: 63
End: 2018-12-11
Attending: NURSE PRACTITIONER
Payer: COMMERCIAL

## 2018-12-11 VITALS
HEIGHT: 64 IN | HEART RATE: 73 BPM | BODY MASS INDEX: 27.49 KG/M2 | OXYGEN SATURATION: 97 % | DIASTOLIC BLOOD PRESSURE: 75 MMHG | TEMPERATURE: 97.6 F | SYSTOLIC BLOOD PRESSURE: 108 MMHG | WEIGHT: 161 LBS

## 2018-12-11 DIAGNOSIS — R53.83 FATIGUE, UNSPECIFIED TYPE: ICD-10-CM

## 2018-12-11 DIAGNOSIS — F43.23 ADJUSTMENT DISORDER WITH MIXED ANXIETY AND DEPRESSED MOOD: Primary | ICD-10-CM

## 2018-12-11 LAB — TSH SERPL DL<=0.005 MIU/L-ACNC: 1.54 MU/L (ref 0.4–4)

## 2018-12-11 PROCEDURE — 99213 OFFICE O/P EST LOW 20 MIN: CPT | Performed by: NURSE PRACTITIONER

## 2018-12-11 PROCEDURE — 99000 SPECIMEN HANDLING OFFICE-LAB: CPT | Performed by: NURSE PRACTITIONER

## 2018-12-11 PROCEDURE — 82306 VITAMIN D 25 HYDROXY: CPT | Mod: 90 | Performed by: NURSE PRACTITIONER

## 2018-12-11 PROCEDURE — 84443 ASSAY THYROID STIM HORMONE: CPT | Performed by: NURSE PRACTITIONER

## 2018-12-11 PROCEDURE — 36415 COLL VENOUS BLD VENIPUNCTURE: CPT | Performed by: NURSE PRACTITIONER

## 2018-12-11 RX ORDER — CITALOPRAM HYDROBROMIDE 20 MG/1
TABLET ORAL
Qty: 30 TABLET | Refills: 0 | Status: SHIPPED | OUTPATIENT
Start: 2018-12-11 | End: 2018-12-27 | Stop reason: ALTCHOICE

## 2018-12-11 ASSESSMENT — ANXIETY QUESTIONNAIRES
GAD7 TOTAL SCORE: 6
6. BECOMING EASILY ANNOYED OR IRRITABLE: SEVERAL DAYS
1. FEELING NERVOUS, ANXIOUS, OR ON EDGE: SEVERAL DAYS
5. BEING SO RESTLESS THAT IT IS HARD TO SIT STILL: NOT AT ALL
3. WORRYING TOO MUCH ABOUT DIFFERENT THINGS: SEVERAL DAYS
2. NOT BEING ABLE TO STOP OR CONTROL WORRYING: SEVERAL DAYS
7. FEELING AFRAID AS IF SOMETHING AWFUL MIGHT HAPPEN: SEVERAL DAYS

## 2018-12-11 ASSESSMENT — PATIENT HEALTH QUESTIONNAIRE - PHQ9
5. POOR APPETITE OR OVEREATING: SEVERAL DAYS
SUM OF ALL RESPONSES TO PHQ QUESTIONS 1-9: 3

## 2018-12-11 ASSESSMENT — MIFFLIN-ST. JEOR: SCORE: 1257.35

## 2018-12-11 ASSESSMENT — PAIN SCALES - GENERAL: PAINLEVEL: NO PAIN (0)

## 2018-12-11 NOTE — PATIENT INSTRUCTIONS
Patient Education     Understanding Anxiety Disorders    Almost everyone gets nervous now and then. It s normal to have knots in your stomach before a test, or for your heart to race on a first date. But an anxiety disorder is much more than a case of nerves. In fact, its symptoms may be overwhelming. But treatment can relieve many of these symptoms. Talking to your healthcare provider is the first step.  What are anxiety disorders?  An anxiety disorder causes intense feelings of panic and fear. These feelings may arise for no apparent reason. And they tend to recur again and again. They may prevent you from coping with life and cause you great distress. As a result, you may avoid anything that triggers your fear. In extreme cases, you may never leave the house. Anxiety disorders may cause other symptoms, such as:    Obsessive thoughts you can t control    Constant nightmares or painful thoughts of the past    Nausea, sweating, and muscle tension    Trouble sleeping or concentrating  What causes anxiety disorders?  Anxiety disorders tend to run in families. For some people, childhood abuse or neglect may play a role. For others, stressful life events or trauma may trigger anxiety disorders. Anxiety can trigger low self-esteem and poor coping skills.  Common anxiety disorders    Panic disorder. This causes an intense fear of being in danger.    Phobias. These are extreme fears of certain objects, places, or events.    Obsessive-compulsive disorder. This causes you to have unwanted thoughts and urges. You also may perform certain actions over and over.    Posttraumatic stress disorder. This occurs in people who have survived a terrible ordeal. It can cause nightmares and flashbacks about the event.    Generalized anxiety disorder. This causes constant worry that can greatly disrupt your life.   Getting better  You may believe that nothing can help you. Or, you might fear what others may think. But most anxiety  symptoms can be eased. Having an anxiety disorder is nothing to be ashamed of. Most people do best with treatment that combines medicine and therapy. These aren t cures. But they can help you live a healthier life.  Date Last Reviewed: 2/1/2017 2000-2018 [employer]. [address] [place] [address]. All rights reserved. This information is not intended as a substitute for professional medical care. Always follow your healthcare professional's instructions.    Patient Education     Depression  Depression is one of the most common mental health problems today. It is not just a state of unhappiness or sadness. It is a true disease. The cause seems to be related to a decrease in chemicals that transmit signals in the brain. Having a family history of depression, alcoholism, or suicide increases the risk. Chronic illness, chronic pain, migraine headaches, and high emotional stress also increase the risk.  Depression is something we tend to recognize in others, but may have a hard time seeing in ourselves. It can show in many physical and emotional ways:    Loss of appetite    Overeating    Not being able to sleep    Sleeping too much    Tiredness not related to physical exertion    Restlessness or irritability    Slowness of movement or speech    Feeling depressed or withdrawn    Loss of interest in things you once enjoyed    Trouble concentrating, poor memory, trouble making decisions    Thoughts of harming or killing oneself, or thoughts that life is not worth living    Low self-esteem  The treatment for depression may include both medicine and psychotherapy. Antidepressants can reduce suffering and can improve the ability to function during the depressed period. Therapy can offer emotional support and help you understand emotional factors that may be causing the depression.  Home care    Ongoing care and support help people manage this disease. Find a healthcare provider and therapist who  meet your needs. Seek help when you feel like you may be getting ill.    Be kind to yourself. Make it a point to do things that you enjoy (gardening, walking in nature, going to a movie). Reward yourself for small successes.    Take care of your physical body. Eat a balanced diet (low in saturated fat and high in fruits and vegetables). Exercise at least 3 times a week for 30 minutes. Even mild-moderate exercise (like brisk walking) can make you feel better.    Don't drink alcohol, which can make depression worse.    Take medicine as prescribed.    Tell each of your healthcare providers about all of the prescription and over-the-counter medicines, vitamins, and supplements you take. Certain supplements interact with medicines and can result in dangerous side effects. Ask your pharmacist when you have questions about medicine interactions.    Talk with your family and trusted friends about your feelings and thoughts. Ask them to help you recognize behavior changes early so you can get help and, if needed, medicine can be adjusted.  Follow-up care  Follow up with your healthcare provider, or as advised.  Call 911  Call 911 if you:    Have suicidal thoughts, a suicide plan, and the means to carry out the plan; or serious thoughts of hurting someone else     Have trouble breathing    Are very confused    Feel very drowsy or have trouble awakening    Faint or lose consciousness    Have new chest pain that becomes more severe, lasts longer, or spreads into your shoulder, arm, neck, jaw, or back  When to seek medical advice  Call your healthcare provider right away if any of these happen:    Feeling extreme depression, fear, anxiety, or anger toward yourself or others    Feeling out of control    Feeling that you may try to harm yourself or another    Hearing voices that others do not hear    Seeing things that others do not see    Can t sleep or eat for 3 days in a row    Friends or family express concern over your  behavior and ask you to seek help  Date Last Reviewed: 10/1/2017    5537-6559 [employer]. [address] [place] [address], [place] [address]. All rights reserved. This information is not intended as a substitute for professional medical care. Always follow your healthcare professional's instructions.

## 2018-12-11 NOTE — PROGRESS NOTES
SUBJECTIVE:   Katelyn Manley is a 63 year old female who presents to clinic today for the following health issues:        Anxiety Follow-Up    Status since last visit: Worsened     Other associated symptoms:Irritable, cranky, stressed, feeling overwhelmed, crying spells    Complicating factors:   Significant life event: No   Current substance abuse: None  Depression symptoms: No  Does go swimming a couple days a week     PENELOPE-7 SCORE 9/10/2018 10/23/2018 12/11/2018   Total Score 0 0 6     PHQ-9 SCORE 9/10/2018 10/23/2018 12/11/2018   PHQ-9 Total Score - - -   PHQ-9 Total Score 0 0 3         PENELOPE-7    Amount of exercise or physical activity: 2-3 days/week for an average of 15-30 minutes    Problems taking medications regularly: No    Medication side effects: none    Diet: regular (no restrictions)            Problem list and histories reviewed & adjusted, as indicated.  Additional history: as documented    Patient Active Problem List   Diagnosis     Pre-diabetes     Leukoplakia     Lichen sclerosus     Dyslipidemia     Status post vaginal hysterectomy     Reactive airway disease that is not asthma     Diverticulosis of large intestine without hemorrhage     H/O diverticulitis of colon     Past Surgical History:   Procedure Laterality Date     COLONOSCOPY  2004     COLONOSCOPY  2-    Dr Sanchez/ sigmoid diverticular dz     COLPORRHAPHY POSTERIOR N/A 2/18/2015    Procedure: COLPORRHAPHY POSTERIOR;  Surgeon: Vasquez Sauer MD;  [facility]     CYSTOSCOPY N/A 2/18/2015    Procedure: CYSTOSCOPY;  Surgeon: Vasquez Sauer MD;  [facility]     DILATION AND CURETTAGE, HYSTEROSCOPY DIAGNOSTIC, COMBINED  5/7/2014    Procedure: COMBINED DILATION AND CURETTAGE, HYSTEROSCOPY DIAGNOSTIC;  Surgeon: Vasquez Sauer MD;  [facility]     EYE SURGERY Left 08/2016    macular hole repair     HYSTERECTOMY VAGINAL, BILATERAL SALPINGO-OOPHERECTOMY, COMBINED N/A 2/18/2015    Procedure: COMBINED HYSTERECTOMY VAGINAL,  SALPINGO-OOPHORECTOMY;  Surgeon: Vasquez Sauer MD;  [facility]     JOINT REPLACEMENT  unijoint    right     JOINT REPLACEMENT  full joint    x2 - left     PHACOEMULSIFICATION WITH STANDARD INTRAOCULAR LENS IMPLANT Left 4/24/2018    Procedure: PHACOEMULSIFICATION WITH STANDARD INTRAOCULAR LENS IMPLANT;  PHACOEMULSIFICATION CATARACT EXTRACTION POSTERIOR CHAMBER LENS LEFT/TECNIS-TORIC, 10% CORINA;  Surgeon: Jerome Snow MD;  [facility]     SLING TRANSOBTURATOR N/A 2/18/2015    Procedure: SLING TRANSOBTURATOR;  Surgeon: Vasquez Sauer MD;  Location[facility]       Social History     Tobacco Use     Smoking status: Never Smoker     Smokeless tobacco: Never Used   Substance Use Topics     Alcohol use: No     Alcohol/week: 0.0 oz     Family History   Problem Relation Age of Onset     Cerebrovascular Disease Father 77        CVA - cause of death     Other - See Comments Mother         [place] Granulometosis - cause of death     Other - See Comments Brother         multiple sclerosis         Current Outpatient Medications   Medication Sig Dispense Refill     albuterol (PROAIR HFA/PROVENTIL HFA/VENTOLIN HFA) 108 (90 Base) MCG/ACT inhaler Inhale 2 puffs into the lungs every 6 hours as needed for shortness of breath / dyspnea or wheezing 1 Inhaler 1     aspirin 81 MG chewable tablet Take 1 tablet (81 mg) by mouth daily 108 tablet 3     Docusate Sodium (COLACE PO) Take 100 mg by mouth daily       Flaxseed, Linseed, (FLAX SEED OIL) 1000 MG capsule Take 1 capsule by mouth daily       GLUCOSAMINE SULFATE PO Take 2,000 mg by mouth daily       Multiple Vitamins-Minerals (MULTIVITAMIN OR) Take 1 capsule by mouth daily.       nystatin-triamcinolone (MYCOLOG II) cream APPLY A PEA SIZE AMOUNT TO AFFECTED AREA AT BEDTIME AS DIRECTED 420 g PRN     Psyllium (METAMUCIL PO)        Allergies   Allergen Reactions     Cats      Cat/feline product derivatives     Dogs      Horse-Derived Products      And cows       Reviewed and updated  "as needed this visit by clinical staff       Reviewed and updated as needed this visit by Provider         ROS:  CONSTITUTIONAL:states she has put on some weight,  made her come in due to her increased irritability   INTEGUMENTARY/SKIN: NEGATIVE for worrisome rashes, moles or lesions  ENT/MOUTH: sinus continues to feel like something is there  RESP:NEGATIVE for significant cough or SOB  CV: NEGATIVE for chest pain, palpitations or peripheral edema  GI: NEGATIVE for nausea, abdominal pain, heartburn, or change in bowel habits  : denies dysuria  MUSCULOSKELETAL: bad knees   NEURO: NEGATIVE for weakness, dizziness or paresthesias  ENDOCRINE: NEGATIVE for temperature intolerance, skin/hair changes  PSYCHIATRIC: increased fatigue, HX anxiety and HX depression    OBJECTIVE:     /75 (BP Location: Left arm, Patient Position: Sitting, Cuff Size: Adult Regular)   Pulse 73   Temp 97.6  F (36.4  C)   Ht 1.613 m (5' 3.5\")   Wt 73 kg (161 lb)   SpO2 97%   BMI 28.07 kg/m    Body mass index is 28.07 kg/m .   GENERAL: alert and no distress  NECK: no adenopathy, no asymmetry, masses, or scars and thyroid normal to palpation  RESP: lungs clear to auscultation - no rales, rhonchi or wheezes  CV: regular rate and rhythm, normal S1 S2, no S3 or S4, no murmur, click or rub, no peripheral edema and peripheral pulses strong  ABDOMEN: soft, nontender, no hepatosplenomegaly, no masses and bowel sounds normal  PSYCH: mentation appears normal, judgement and insight intact and appearance well groomed    Diagnostic Test Results:  Vitamin D and TSH labs pending    ASSESSMENT/PLAN:     1. Adjustment disorder with mixed anxiety and depressed mood  Plan to try low dose antidepressant and check labs today.  Plan to notify of labs once available. Katelyn is heading to [place] in January. Encouraged her to follow up before she leaves.  Discussed medication and side effects of medication today.   - citalopram (CELEXA) 20 MG tablet; " Take 1/2 tablet (10 mg) for 1-2 weeks, then increase to 1 tablet orally daily  Dispense: 30 tablet; Refill: 0  - TSH with free T4 reflex    2. Fatigue, unspecified type  Increased fatigue with anxiety.   - TSH with free T4 reflex  - Vitamin D Deficiency        See Patient Instructions    LUIS Paris [facility] - [place]

## 2018-12-11 NOTE — LETTER
December 17, 2018      Katelyn Manley  [address] [place] [address]        Dear ,    We are writing to inform you of your test results.    Your test results fall within the expected range(s) or remain unchanged from previous results.  Please continue with current treatment plan.     Normal Thyroid lab and vitamin D level, take vitamin D 2000IU in the summer and 4000IU in the winter    Resulted Orders   TSH with free T4 reflex   Result Value Ref Range    TSH 1.54 0.40 - 4.00 mU/L   Vitamin D Deficiency   Result Value Ref Range    Vitamin D Deficiency screening 33 20 - 75 ug/L      Comment:      Season, race, dietary intake, and treatment affect the concentration of   25-hydroxy-Vitamin D. Values may decrease during winter months and increase   during summer months. Values 20-29 ug/L may indicate Vitamin D insufficiency   and values <20 ug/L may indicate Vitamin D deficiency.  Vitamin D determination is routinely performed by an immunoassay specific for   25 hydroxyvitamin D3.  If an individual is on vitamin D2 (ergocalciferol)   supplementation, please specify 25 OH vitamin D2 and D3 level determination by   LCMSMS test [id number].         If you have any questions or concerns, please call the clinic at the number listed above.       Sincerely,        LUIS Paris CNP

## 2018-12-11 NOTE — NURSING NOTE
"Chief Complaint   Patient presents with     Anxiety       Initial /75 (BP Location: Left arm, Patient Position: Sitting, Cuff Size: Adult Regular)   Pulse 73   Temp 97.6  F (36.4  C)   Ht 1.613 m (5' 3.5\")   Wt 73 kg (161 lb)   SpO2 97%   BMI 28.07 kg/m   Estimated body mass index is 28.07 kg/m  as calculated from the following:    Height as of this encounter: 1.613 m (5' 3.5\").    Weight as of this encounter: 73 kg (161 lb).  Medication Reconciliation: complete    Coral White LPN  "

## 2018-12-12 LAB — DEPRECATED CALCIDIOL+CALCIFEROL SERPL-MC: 33 UG/L (ref 20–75)

## 2018-12-12 ASSESSMENT — ANXIETY QUESTIONNAIRES: GAD7 TOTAL SCORE: 6

## 2018-12-26 NOTE — PROGRESS NOTES
SUBJECTIVE:   Katelyn Manley is a 63 year old female who presents to clinic today for the following health issues:      Abnormal Mood Symptoms      Duration: months    Description:  Depression: YES  Anxiety: YES  Panic attacks: no      Accompanying signs and symptoms: see PHQ-9 and PENELOPE scores  PHQ-9 SCORE 10/23/2018 12/11/2018 12/27/2018   PHQ-9 Total Score - - -   PHQ-9 Total Score 0 3 2     PENELOPE-7 SCORE 10/23/2018 12/11/2018 12/27/2018   Total Score 0 6 0       History (similar episodes/previous evaluation): None    Precipitating or alleviating factors: None    Therapies tried and outcome: Celexa (Citalopram); doing very well on current dose and medication 10 mg daily          Problem list and histories reviewed & adjusted, as indicated.  Additional history: as documented    Patient Active Problem List   Diagnosis     Pre-diabetes     Leukoplakia     Lichen sclerosus     Dyslipidemia     Status post vaginal hysterectomy     Reactive airway disease that is not asthma     Diverticulosis of large intestine without hemorrhage     H/O diverticulitis of colon     Past Surgical History:   Procedure Laterality Date     COLONOSCOPY  2004     COLONOSCOPY  2-    Dr Sanchez/ sigmoid diverticular dz     COLPORRHAPHY POSTERIOR N/A 2/18/2015    Procedure: COLPORRHAPHY POSTERIOR;  Surgeon: Vasquez Sauer MD;  [facility]     CYSTOSCOPY N/A 2/18/2015    Procedure: CYSTOSCOPY;  Surgeon: Vasquez Sauer MD;  [facility]     DILATION AND CURETTAGE, HYSTEROSCOPY DIAGNOSTIC, COMBINED  5/7/2014    Procedure: COMBINED DILATION AND CURETTAGE, HYSTEROSCOPY DIAGNOSTIC;  Surgeon: Vasquez Sauer MD;  [facility]     EYE SURGERY Left 08/2016    macular hole repair     HYSTERECTOMY VAGINAL, BILATERAL SALPINGO-OOPHERECTOMY, COMBINED N/A 2/18/2015    Procedure: COMBINED HYSTERECTOMY VAGINAL, SALPINGO-OOPHORECTOMY;  Surgeon: Vasquez Sauer MD;  [facility]     JOINT REPLACEMENT  unijoint    right     JOINT REPLACEMENT  full  joint    x2 - left     PHACOEMULSIFICATION WITH STANDARD INTRAOCULAR LENS IMPLANT Left 4/24/2018    Procedure: PHACOEMULSIFICATION WITH STANDARD INTRAOCULAR LENS IMPLANT;  PHACOEMULSIFICATION CATARACT EXTRACTION POSTERIOR CHAMBER LENS LEFT/TECNIS-TORIC, 10% CORINA;  Surgeon: Jerome Snow MD;  [facility]     SLING TRANSOBTURATOR N/A 2/18/2015    Procedure: SLING TRANSOBTURATOR;  Surgeon: Vasquez Sauer MD;  [facility]       Social History     Tobacco Use     Smoking status: Never Smoker     Smokeless tobacco: Never Used   Substance Use Topics     Alcohol use: No     Alcohol/week: 0.0 oz     Family History   Problem Relation Age of Onset     Cerebrovascular Disease Father 77        CVA - cause of death     Other - See Comments Mother         [place] Granulometosis - cause of death     Other - See Comments Brother         multiple sclerosis         Current Outpatient Medications   Medication Sig Dispense Refill     albuterol (PROAIR HFA/PROVENTIL HFA/VENTOLIN HFA) 108 (90 Base) MCG/ACT inhaler Inhale 2 puffs into the lungs every 6 hours as needed for shortness of breath / dyspnea or wheezing 1 Inhaler 1     aspirin 81 MG chewable tablet Take 1 tablet (81 mg) by mouth daily 108 tablet 3     citalopram (CELEXA) 10 MG tablet Take 1 tablet (10 mg) by mouth daily 90 tablet 3     citalopram (CELEXA) 20 MG tablet Take 1/2 tablet (10 mg) for 1-2 weeks, then increase to 1 tablet orally daily 30 tablet 0     Flaxseed, Linseed, (FLAX SEED OIL) 1000 MG capsule Take 1 capsule by mouth daily       GLUCOSAMINE SULFATE PO Take 2,000 mg by mouth daily       Multiple Vitamins-Minerals (MULTIVITAMIN OR) Take 1 capsule by mouth daily.       nystatin-triamcinolone (MYCOLOG II) cream APPLY A PEA SIZE AMOUNT TO AFFECTED AREA AT BEDTIME AS DIRECTED 420 g PRN     Psyllium (METAMUCIL PO)        Allergies   Allergen Reactions     Cats      Cat/feline product derivatives     Dogs      Horse-Derived Products      And cows  "      Reviewed and updated as needed this visit by clinical staff       Reviewed and updated as needed this visit by Provider         ROS:  CONSTITUTIONAL: NEGATIVE for fever, chills, change in weight  INTEGUMENTARY/SKIN: NEGATIVE for worrisome rashes, moles or lesions  RESP: NEGATIVE for significant cough or SOB  CV: NEGATIVE for chest pain, palpitations or peripheral edema  PSYCHIATRIC: angry feels have cleared up with 10 mg daily    OBJECTIVE:     /60   Pulse 68   Temp 97.8  F (36.6  C) (Tympanic)   Resp 16   Ht 1.727 m (5' 8\")   Wt 73 kg (161 lb)   SpO2 98%   BMI 24.48 kg/m    Body mass index is 24.48 kg/m .   GENERAL: healthy, alert and no distress  NECK: no adenopathy, no asymmetry, masses, or scars and thyroid normal to palpation  RESP: lungs clear to auscultation - no rales, rhonchi or wheezes  CV: regular rate and rhythm, normal S1 S2, no S3 or S4, no murmur, click or rub, no peripheral edema and peripheral pulses strong  ABDOMEN: soft, nontender, no hepatosplenomegaly, no masses and bowel sounds normal  PSYCH: mentation appears normal, affect normal/bright    Diagnostic Test Results:  Results for orders placed or performed in visit on 12/11/18   TSH with free T4 reflex   Result Value Ref Range    TSH 1.54 0.40 - 4.00 mU/L   Vitamin D Deficiency   Result Value Ref Range    Vitamin D Deficiency screening 33 20 - 75 ug/L       ASSESSMENT/PLAN:     1. Adjustment disorder with mixed anxiety and depressed mood  Doing great on current dose. Continue current therapy.  Katelyn is heading to [place] for the next few months. Also discussed vitamin D and she can take 2000IU summer or while in [place] and 4000IU [place]. Katelyn should follow up with her PCP in 6 months or as needed.   - citalopram (CELEXA) 10 MG tablet; Take 1 tablet (10 mg) by mouth daily  Dispense: 90 tablet; Refill: 3        See Patient Instructions    LUIS Paris [facility] - [place]  "

## 2018-12-27 ENCOUNTER — OFFICE VISIT (OUTPATIENT)
Dept: FAMILY MEDICINE | Facility: OTHER | Age: 63
End: 2018-12-27
Attending: NURSE PRACTITIONER
Payer: COMMERCIAL

## 2018-12-27 VITALS
TEMPERATURE: 97.8 F | BODY MASS INDEX: 24.4 KG/M2 | DIASTOLIC BLOOD PRESSURE: 60 MMHG | OXYGEN SATURATION: 98 % | RESPIRATION RATE: 16 BRPM | HEART RATE: 68 BPM | HEIGHT: 68 IN | SYSTOLIC BLOOD PRESSURE: 110 MMHG | WEIGHT: 161 LBS

## 2018-12-27 DIAGNOSIS — F43.23 ADJUSTMENT DISORDER WITH MIXED ANXIETY AND DEPRESSED MOOD: ICD-10-CM

## 2018-12-27 PROCEDURE — 99213 OFFICE O/P EST LOW 20 MIN: CPT | Performed by: NURSE PRACTITIONER

## 2018-12-27 RX ORDER — CITALOPRAM HYDROBROMIDE 10 MG/1
10 TABLET ORAL DAILY
Qty: 90 TABLET | Refills: 3 | Status: SHIPPED | OUTPATIENT
Start: 2018-12-27 | End: 2019-10-28

## 2018-12-27 ASSESSMENT — ANXIETY QUESTIONNAIRES
IF YOU CHECKED OFF ANY PROBLEMS ON THIS QUESTIONNAIRE, HOW DIFFICULT HAVE THESE PROBLEMS MADE IT FOR YOU TO DO YOUR WORK, TAKE CARE OF THINGS AT HOME, OR GET ALONG WITH OTHER PEOPLE: NOT DIFFICULT AT ALL
4. TROUBLE RELAXING: NOT AT ALL
1. FEELING NERVOUS, ANXIOUS, OR ON EDGE: NOT AT ALL
6. BECOMING EASILY ANNOYED OR IRRITABLE: NOT AT ALL
7. FEELING AFRAID AS IF SOMETHING AWFUL MIGHT HAPPEN: NOT AT ALL
3. WORRYING TOO MUCH ABOUT DIFFERENT THINGS: NOT AT ALL
GAD7 TOTAL SCORE: 0
5. BEING SO RESTLESS THAT IT IS HARD TO SIT STILL: NOT AT ALL
2. NOT BEING ABLE TO STOP OR CONTROL WORRYING: NOT AT ALL

## 2018-12-27 ASSESSMENT — MIFFLIN-ST. JEOR: SCORE: 1328.79

## 2018-12-27 ASSESSMENT — PATIENT HEALTH QUESTIONNAIRE - PHQ9: SUM OF ALL RESPONSES TO PHQ QUESTIONS 1-9: 2

## 2018-12-27 ASSESSMENT — PAIN SCALES - GENERAL: PAINLEVEL: NO PAIN (0)

## 2018-12-27 NOTE — NURSING NOTE
"Chief Complaint   Patient presents with     Anxiety     Depression       Initial /60   Pulse 68   Temp 97.8  F (36.6  C) (Tympanic)   Resp 16   Ht 1.727 m (5' 8\")   Wt 73 kg (161 lb)   SpO2 98%   BMI 24.48 kg/m   Estimated body mass index is 24.48 kg/m  as calculated from the following:    Height as of this encounter: 1.727 m (5' 8\").    Weight as of this encounter: 73 kg (161 lb).  Medication Reconciliation: complete    Brittany Caceres, ABELARDO    "

## 2018-12-27 NOTE — PATIENT INSTRUCTIONS
Patient Education     Depression Affects Your Mind and Body    Everyone feels sad or  blue  from time to time for a few days or weeks. Depression is when these feelings don't go away and they interfere with daily life.  Depression is a real illness that can develop at any age. It is one of the most common mental health problems in the [place] Depression makes you feel sad, helpless, and hopeless. It gets in the way of your life and relationships. It inhibits your ability to think and act. But, with help, you can feel better again.  Depression affects your whole body  Brain chemicals affect your body as well as your mood. So depression may do more than just make you feel low. You may also feel bad physically. Depression can:    Cause trouble with mental tasks such as remembering, concentrating, or making decisions    Make you feel nervous and jumpy    Cause trouble sleeping. Or you may sleep too much    Change your appetite    Cause headaches, stomachaches, or other aches and pains    Drain your body of energy  Depression and other illness  It is common for people who have chronic health problems to also have depression. It can often be hard to tell which one caused the other. A person might become depressed after finding out they have a health problem. But some studies suggest being depressed may make certain health problems more likely. And some depressed people stop taking care of themselves. This may make them more likely to get sick.  Date Last Reviewed: 1/1/2017 2000-2018 [employer]. [address] [place] [address], [place] [address]. All rights reserved. This information is not intended as a substitute for professional medical care. Always follow your healthcare professional's instructions.

## 2018-12-29 ASSESSMENT — ANXIETY QUESTIONNAIRES: GAD7 TOTAL SCORE: 0

## 2019-05-13 NOTE — PROGRESS NOTES
SUBJECTIVE:   Katelyn Manley is a 64 year old female who presents to clinic today for the following   health issues:      Depression Followup    Status since last visit: Improved     See PHQ-9 for current symptoms.  Other associated symptoms: None    Complicating factors:   Significant life event:  No   Current substance abuse:  None  Anxiety or Panic symptoms:  No    NP [place] notes reviewed.       PHQ 12/11/2018 12/27/2018 5/20/2019   PHQ-9 Total Score 3 2 3   Q9: Thoughts of better off dead/self-harm past 2 weeks Not at all Not at all Not at all     PENELOPE-7 SCORE 12/11/2018 12/27/2018 5/20/2019   Total Score 6 0 0           PHQ-9  English  PHQ-9   Any Language  Suicide Assessment Five-step Evaluation and Treatment (SAFE-T)    Amount of exercise or physical activity: None    Problems taking medications regularly: No    Medication side effects: none    Diet: regular (no restrictions)            Additional history: as documented    Reviewed  and updated as needed this visit by clinical staff         Reviewed and updated as needed this visit by Provider         Patient Active Problem List   Diagnosis     Pre-diabetes     Leukoplakia     Lichen sclerosus     Dyslipidemia     Status post vaginal hysterectomy     Reactive airway disease that is not asthma     Diverticulosis of large intestine without hemorrhage     H/O diverticulitis of colon     Past Surgical History:   Procedure Laterality Date     COLONOSCOPY  2004     COLONOSCOPY  2-    Dr Sanchez/ sigmoid diverticular dz     COLPORRHAPHY POSTERIOR N/A 2/18/2015    Procedure: COLPORRHAPHY POSTERIOR;  Surgeon: Vasquez Sauer MD;  Location: [facility]     CYSTOSCOPY N/A 2/18/2015    Procedure: CYSTOSCOPY;  Surgeon: Vasquez Sauer MD;  Location: [facility]     DILATION AND CURETTAGE, HYSTEROSCOPY DIAGNOSTIC, COMBINED  5/7/2014    Procedure: COMBINED DILATION AND CURETTAGE, HYSTEROSCOPY DIAGNOSTIC;  Surgeon: Vasquez Sauer MD;  Location: [facility]     EYE SURGERY Left  "08/2016    macular hole repair     HYSTERECTOMY VAGINAL, BILATERAL SALPINGO-OOPHERECTOMY, COMBINED N/A 2/18/2015    Procedure: COMBINED HYSTERECTOMY VAGINAL, SALPINGO-OOPHORECTOMY;  Surgeon: Vasquez Sauer MD;  Location: [facility]     JOINT REPLACEMENT  unijoint    right     JOINT REPLACEMENT  full joint    x2 - left     PHACOEMULSIFICATION WITH STANDARD INTRAOCULAR LENS IMPLANT Left 4/24/2018    Procedure: PHACOEMULSIFICATION WITH STANDARD INTRAOCULAR LENS IMPLANT;  PHACOEMULSIFICATION CATARACT EXTRACTION POSTERIOR CHAMBER LENS LEFT/TECNIS-TORIC, 10% CORINA;  Surgeon: Jerome Snow MD;  Location: [facility]     SLING TRANSOBTURATOR N/A 2/18/2015    Procedure: SLING TRANSOBTURATOR;  Surgeon: Vasquez Sauer MD;  Location: [facility]       Social History     Tobacco Use     Smoking status: Never Smoker     Smokeless tobacco: Never Used   Substance Use Topics     Alcohol use: No     Alcohol/week: 0.0 oz     Family History   Problem Relation Age of Onset     Cerebrovascular Disease Father 77        CVA - cause of death     Other - See Comments Mother         [place] Granulometosis - cause of death     Other - See Comments Brother         multiple sclerosis           ROS:  CONSTITUTIONAL: NEGATIVE for fever, chills, change in weight  RESP: NEGATIVE for significant cough or SOB  CV: NEGATIVE for chest pain, palpitations or peripheral edema  ROS otherwise negative    OBJECTIVE:                                                    /64   Pulse 77   Temp 97.8  F (36.6  C)   Ht 1.727 m (5' 8\")   Wt 73.5 kg (162 lb)   SpO2 98%   BMI 24.63 kg/m    Body mass index is 24.63 kg/m .   GENERAL: healthy, alert, well nourished, well hydrated, no distress  PSYCH: Alert and oriented times 3; speech- coherent , normal rate and volume; able to articulate logical thoughts, able to abstract reason, no tangential thoughts, no hallucinations or delusions, affect- normal         ASSESSMENT/PLAN:                                                  "     (F43.23) Adjustment disorder with mixed anxiety and depressed mood  (primary encounter diagnosis)  Comment: doing great   Plan: will stay on meds for now and possible long term. Continue current medications and behavioral changes.           Robert Blakely MD  [facility] [place]

## 2019-05-20 ENCOUNTER — OFFICE VISIT (OUTPATIENT)
Dept: FAMILY MEDICINE | Facility: OTHER | Age: 64
End: 2019-05-20
Attending: FAMILY MEDICINE
Payer: COMMERCIAL

## 2019-05-20 VITALS
WEIGHT: 162 LBS | HEART RATE: 77 BPM | DIASTOLIC BLOOD PRESSURE: 64 MMHG | OXYGEN SATURATION: 98 % | SYSTOLIC BLOOD PRESSURE: 112 MMHG | TEMPERATURE: 97.8 F | HEIGHT: 68 IN | BODY MASS INDEX: 24.55 KG/M2

## 2019-05-20 DIAGNOSIS — F43.23 ADJUSTMENT DISORDER WITH MIXED ANXIETY AND DEPRESSED MOOD: Primary | ICD-10-CM

## 2019-05-20 PROCEDURE — 99213 OFFICE O/P EST LOW 20 MIN: CPT | Performed by: FAMILY MEDICINE

## 2019-05-20 PROCEDURE — G0463 HOSPITAL OUTPT CLINIC VISIT: HCPCS

## 2019-05-20 ASSESSMENT — ANXIETY QUESTIONNAIRES
5. BEING SO RESTLESS THAT IT IS HARD TO SIT STILL: NOT AT ALL
7. FEELING AFRAID AS IF SOMETHING AWFUL MIGHT HAPPEN: NOT AT ALL
GAD7 TOTAL SCORE: 0
3. WORRYING TOO MUCH ABOUT DIFFERENT THINGS: NOT AT ALL
2. NOT BEING ABLE TO STOP OR CONTROL WORRYING: NOT AT ALL
1. FEELING NERVOUS, ANXIOUS, OR ON EDGE: NOT AT ALL
6. BECOMING EASILY ANNOYED OR IRRITABLE: NOT AT ALL
4. TROUBLE RELAXING: NOT AT ALL

## 2019-05-20 ASSESSMENT — PATIENT HEALTH QUESTIONNAIRE - PHQ9: SUM OF ALL RESPONSES TO PHQ QUESTIONS 1-9: 3

## 2019-05-20 ASSESSMENT — PAIN SCALES - GENERAL: PAINLEVEL: NO PAIN (0)

## 2019-05-20 ASSESSMENT — MIFFLIN-ST. JEOR: SCORE: 1328.33

## 2019-05-20 NOTE — LETTER
My Depression Action Plan  Name: Katelyn Manley   Date of Birth 1955  Date: 5/20/2019    My doctor: Robert Blakely   My clinic: [facility] [place]  [address] [place] [address]  [phone]          GREEN    ZONE   Good Control    What it looks like:     Things are going generally well. You have normal up s and down s. You may even feel depressed from time to time, but bad moods usually last less than a day.   What you need to do:  1. Continue to care for yourself (see self care plan)  2. Check your depression survival kit and update it as needed  3. Follow your physician s recommendations including any medication.  4. Do not stop taking medication unless you consult with your physician first.           YELLOW         ZONE Getting Worse    What it looks like:     Depression is starting to interfere with your life.     It may be hard to get out of bed; you may be starting to isolate yourself from others.    Symptoms of depression are starting to last most all day and this has happened for several days.     You may have suicidal thoughts but they are not constant.   What you need to do:     1. Call your care team, your response to treatment will improve if you keep your care team informed of your progress. Yellow periods are signs an adjustment may need to be made.     2. Continue your self-care, even if you have to fake it!    3. Talk to someone in your support network    4. Open up your depression survival kit           RED    ZONE Medical Alert - Get Help    What it looks like:     Depression is seriously interfering with your life.     You may experience these or other symptoms: You can t get out of bed most days, can t work or engage in other necessary activities, you have trouble taking care of basic hygiene, or basic responsibilities, thoughts of suicide or death that will not go away, self-injurious behavior.     What you need to do:  1. Call your care team and request a  same-day appointment. If they are not available (weekends or after hours) call your local crisis line, emergency room or 911.            Depression Self Care Plan / Survival Kit    Self-Care for Depression  Here s the deal. Your body and mind are really not as separate as most people think.  What you do and think affects how you feel and how you feel influences what you do and think. This means if you do things that people who feel good do, it will help you feel better.  Sometimes this is all it takes.  There is also a place for medication and therapy depending on how severe your depression is, so be sure to consult with your medical provider and/ or Behavioral Health Consultant if your symptoms are worsening or not improving.     In order to better manage my stress, I will:    Exercise  Get some form of exercise, every day. This will help reduce pain and release endorphins, the  feel good  chemicals in your brain. This is almost as good as taking antidepressants!  This is not the same as joining a gym and then never going! (they count on that by the way ) It can be as simple as just going for a walk or doing some gardening, anything that will get you moving.      Hygiene   Maintain good hygiene (Get out of bed in the morning, Make your bed, Brush your teeth, Take a shower, and Get dressed like you were going to work, even if you are unemployed).  If your clothes don't fit try to get ones that do.    Diet  I will strive to eat foods that are good for me, drink plenty of water, and avoid excessive sugar, caffeine, alcohol, and other mood-altering substances.  Some foods that are helpful in depression are: complex carbohydrates, B vitamins, flaxseed, fish or fish oil, fresh fruits and vegetables.    Psychotherapy  I agree to participate in Individual Therapy (if recommended).    Medication  If prescribed medications, I agree to take them.  Missing doses can result in serious side effects.  I understand that drinking  alcohol, or other illicit drug use, may cause potential side effects.  I will not stop my medication abruptly without first discussing it with my provider.    Staying Connected With Others  I will stay in touch with my friends, family members, and my primary care provider/team.    Use your imagination  Be creative.  We all have a creative side; it doesn t matter if it s oil painting, sand castles, or mud pies! This will also kick up the endorphins.    Witness Beauty  (AKA stop and smell the roses) Take a look outside, even in mid-winter. Notice colors, textures. Watch the squirrels and birds.     Service to others  Be of service to others.  There is always someone else in need.  By helping others we can  get out of ourselves  and remember the really important things.  This also provides opportunities for practicing all the other parts of the program.    Humor  Laugh and be silly!  Adjust your TV habits for less news and crime-drama and more comedy.    Control your stress  Try breathing deep, massage therapy, biofeedback, and meditation. Find time to relax each day.     My support system    Clinic Contact:  Phone number:    Contact 1:  Phone number:    Contact 2:  Phone number:    Nondenominational/:  Phone number:    Therapist:  Phone number:    Local crisis center:    Phone number:    Other community support:  Phone number:

## 2019-05-20 NOTE — NURSING NOTE
"Chief Complaint   Patient presents with     Depression       Initial /64   Pulse 77   Temp 97.8  F (36.6  C)   Ht 1.727 m (5' 8\")   Wt 73.5 kg (162 lb)   SpO2 98%   BMI 24.63 kg/m   Estimated body mass index is 24.63 kg/m  as calculated from the following:    Height as of this encounter: 1.727 m (5' 8\").    Weight as of this encounter: 73.5 kg (162 lb).  Medication Reconciliation: complete    Klaus Gillette LPN  "

## 2019-05-21 ASSESSMENT — ANXIETY QUESTIONNAIRES: GAD7 TOTAL SCORE: 0

## 2019-07-09 ENCOUNTER — OFFICE VISIT (OUTPATIENT)
Dept: CHIROPRACTIC MEDICINE | Facility: OTHER | Age: 64
End: 2019-07-09
Attending: CHIROPRACTOR
Payer: COMMERCIAL

## 2019-07-09 DIAGNOSIS — M99.01 SEGMENTAL AND SOMATIC DYSFUNCTION OF CERVICAL REGION: ICD-10-CM

## 2019-07-09 DIAGNOSIS — M54.50 ACUTE BILATERAL LOW BACK PAIN WITHOUT SCIATICA: ICD-10-CM

## 2019-07-09 DIAGNOSIS — M99.03 SEGMENTAL AND SOMATIC DYSFUNCTION OF LUMBAR REGION: Primary | ICD-10-CM

## 2019-07-09 DIAGNOSIS — M99.02 SEGMENTAL AND SOMATIC DYSFUNCTION OF THORACIC REGION: ICD-10-CM

## 2019-07-09 PROCEDURE — 98941 CHIROPRACT MANJ 3-4 REGIONS: CPT | Mod: AT | Performed by: CHIROPRACTOR

## 2019-07-11 NOTE — PROGRESS NOTES
Subjective Finding:    Chief compalint: Patient presents with:  Neck Pain  Back Pain  , Pain Scale: 5/10, Intensity: sharp, Duration: 1 weeks, Change since last visit: , Radiating: no.    Date of injury:     Activities that the pain restricts:   Home/household activities: no.  Work duties: no.  Hobbies/social: no.  Sleep: no.  Makes symptoms better: rest.  Makes symptoms worse: activity, cervical extension and cervical flexion.  Have you seen anyone else for the symptoms? No.  Work related: no.  Automobile related injury: no.    Objective and Assessment:    Posture Analysis:   High shoulder: right.  Head tilt: .  High iliac crest: .  Head carriage: forward.  Thoracic Kyphosis: neutral.  Lumbar Lordosis: neutral.    Lumbar Range of Motion: flexion decreased.  Cervical Range of Motion: flexion decreased.  Thoracic Range of Motion: .  Extremity Range of Motion: .    Palpation:   C paraspine tightness with restricted ROM      Segmental dysfunction pre-treatment: T5   C7    L45    Assessment post-treatment:  Cervical: ROM increased and pain and tenderness decreased.  Thoracic: ROM increased.  Lumbar: ROM increased.    Comments: .      Complicating Factors: .    Plan / Procedure:    Expected release date: .  Treatment plan: PRN.  Instructed patient: ice 20 minutes every other hour as needed.  Short term goals: reduce pain.  Long term goals: restore normal function.  Prognosis: excellent.

## 2019-07-18 ENCOUNTER — OFFICE VISIT (OUTPATIENT)
Dept: CHIROPRACTIC MEDICINE | Facility: OTHER | Age: 64
End: 2019-07-18
Attending: CHIROPRACTOR
Payer: COMMERCIAL

## 2019-07-18 DIAGNOSIS — M54.50 ACUTE BILATERAL LOW BACK PAIN WITHOUT SCIATICA: ICD-10-CM

## 2019-07-18 DIAGNOSIS — M99.02 SEGMENTAL AND SOMATIC DYSFUNCTION OF THORACIC REGION: ICD-10-CM

## 2019-07-18 DIAGNOSIS — M99.03 SEGMENTAL AND SOMATIC DYSFUNCTION OF LUMBAR REGION: Primary | ICD-10-CM

## 2019-07-18 PROCEDURE — 98940 CHIROPRACT MANJ 1-2 REGIONS: CPT | Mod: AT | Performed by: CHIROPRACTOR

## 2019-09-13 ENCOUNTER — HOSPITAL ENCOUNTER (EMERGENCY)
Facility: HOSPITAL | Age: 64
Discharge: HOME | End: 2019-09-14
Attending: EMERGENCY MEDICINE | Admitting: EMERGENCY MEDICINE
Payer: COMMERCIAL

## 2019-09-13 DIAGNOSIS — K57.32 DIVERTICULITIS OF COLON: Primary | ICD-10-CM

## 2019-09-13 PROCEDURE — 99285 EMERGENCY DEPT VISIT HI MDM: CPT | Mod: Z6 | Performed by: EMERGENCY MEDICINE

## 2019-09-13 PROCEDURE — 99285 EMERGENCY DEPT VISIT HI MDM: CPT | Mod: 25

## 2019-09-13 RX ORDER — MORPHINE SULFATE 2 MG/ML
2 INJECTION, SOLUTION INTRAMUSCULAR; INTRAVENOUS ONCE
Status: COMPLETED | OUTPATIENT
Start: 2019-09-13 | End: 2019-09-14

## 2019-09-13 RX ORDER — ERGOCALCIFEROL 1.25 MG/1
50000 CAPSULE, LIQUID FILLED ORAL WEEKLY
COMMUNITY
End: 2019-09-16

## 2019-09-13 NOTE — ED AVS SNAPSHOT
[facility]  [address] [address]  Phone:  [phone]                                    Katelyn Manley   MRN: [id number]    Department:  [facility]   Date of Visit:  9/13/2019           After Visit Summary Signature Page    I have received my discharge instructions, and my questions have been answered. I have discussed any challenges I see with this plan with the nurse or doctor.    ..........................................................................................................................................  Patient/Patient Representative Signature      ..........................................................................................................................................  Patient Representative Print Name and Relationship to Patient    ..................................................               ................................................  Date                                   Time    ..........................................................................................................................................  Reviewed by Signature/Title    ...................................................              ..............................................  Date                                               Time          22EPIC Rev 08/18

## 2019-09-14 ENCOUNTER — APPOINTMENT (OUTPATIENT)
Dept: CT IMAGING | Facility: HOSPITAL | Age: 64
End: 2019-09-14
Attending: EMERGENCY MEDICINE
Payer: COMMERCIAL

## 2019-09-14 VITALS
TEMPERATURE: 98.7 F | RESPIRATION RATE: 16 BRPM | DIASTOLIC BLOOD PRESSURE: 87 MMHG | OXYGEN SATURATION: 97 % | SYSTOLIC BLOOD PRESSURE: 145 MMHG

## 2019-09-14 LAB
ALBUMIN SERPL-MCNC: 3.5 G/DL (ref 3.4–5)
ALBUMIN UR-MCNC: NEGATIVE MG/DL
ALP SERPL-CCNC: 83 U/L (ref 40–150)
ALT SERPL W P-5'-P-CCNC: 28 U/L (ref 0–50)
AMYLASE SERPL-CCNC: 109 U/L (ref 30–110)
ANION GAP SERPL CALCULATED.3IONS-SCNC: 8 MMOL/L (ref 3–14)
APPEARANCE UR: CLEAR
APTT PPP: 25 SEC (ref 24–37)
AST SERPL W P-5'-P-CCNC: 26 U/L (ref 0–45)
BASOPHILS # BLD AUTO: 0.1 10E9/L (ref 0–0.2)
BASOPHILS NFR BLD AUTO: 0.6 %
BILIRUB SERPL-MCNC: 0.3 MG/DL (ref 0.2–1.3)
BILIRUB UR QL STRIP: NEGATIVE
BUN SERPL-MCNC: 21 MG/DL (ref 7–30)
CALCIUM SERPL-MCNC: 8.8 MG/DL (ref 8.5–10.1)
CHLORIDE SERPL-SCNC: 110 MMOL/L (ref 94–109)
CO2 SERPL-SCNC: 23 MMOL/L (ref 20–32)
COLOR UR AUTO: NORMAL
CREAT SERPL-MCNC: 0.87 MG/DL (ref 0.52–1.04)
DIFFERENTIAL METHOD BLD: ABNORMAL
EOSINOPHIL # BLD AUTO: 0.2 10E9/L (ref 0–0.7)
EOSINOPHIL NFR BLD AUTO: 2.1 %
ERYTHROCYTE [DISTWIDTH] IN BLOOD BY AUTOMATED COUNT: 13.2 % (ref 10–15)
GFR SERPL CREATININE-BSD FRML MDRD: 70 ML/MIN/{1.73_M2}
GLUCOSE SERPL-MCNC: 121 MG/DL (ref 70–99)
GLUCOSE UR STRIP-MCNC: NEGATIVE MG/DL
HCT VFR BLD AUTO: 37.5 % (ref 35–47)
HGB BLD-MCNC: 12.7 G/DL (ref 11.7–15.7)
HGB UR QL STRIP: NEGATIVE
IMM GRANULOCYTES # BLD: 0.1 10E9/L (ref 0–0.4)
IMM GRANULOCYTES NFR BLD: 0.4 %
INR PPP: 0.87 (ref 0.8–1.2)
KETONES UR STRIP-MCNC: NEGATIVE MG/DL
LACTATE BLD-SCNC: 2.2 MMOL/L (ref 0.7–2)
LEUKOCYTE ESTERASE UR QL STRIP: NEGATIVE
LIPASE SERPL-CCNC: 158 U/L (ref 73–393)
LIPASE SERPL-CCNC: 164 U/L (ref 73–393)
LYMPHOCYTES # BLD AUTO: 3.3 10E9/L (ref 0.8–5.3)
LYMPHOCYTES NFR BLD AUTO: 28.7 %
MCH RBC QN AUTO: 28.6 PG (ref 26.5–33)
MCHC RBC AUTO-ENTMCNC: 33.9 G/DL (ref 31.5–36.5)
MCV RBC AUTO: 85 FL (ref 78–100)
MONOCYTES # BLD AUTO: 0.6 10E9/L (ref 0–1.3)
MONOCYTES NFR BLD AUTO: 5.5 %
NEUTROPHILS # BLD AUTO: 7.3 10E9/L (ref 1.6–8.3)
NEUTROPHILS NFR BLD AUTO: 62.7 %
NITRATE UR QL: NEGATIVE
NRBC # BLD AUTO: 0 10*3/UL
NRBC BLD AUTO-RTO: 0 /100
PH UR STRIP: 6 PH (ref 4.7–8)
PLATELET # BLD AUTO: 270 10E9/L (ref 150–450)
POTASSIUM SERPL-SCNC: 4.1 MMOL/L (ref 3.4–5.3)
PROT SERPL-MCNC: 7.6 G/DL (ref 6.8–8.8)
RBC # BLD AUTO: 4.44 10E12/L (ref 3.8–5.2)
SODIUM SERPL-SCNC: 141 MMOL/L (ref 133–144)
SOURCE: NORMAL
SP GR UR STRIP: 1.02 (ref 1–1.03)
UROBILINOGEN UR STRIP-MCNC: NORMAL MG/DL (ref 0–2)
WBC # BLD AUTO: 11.6 10E9/L (ref 4–11)

## 2019-09-14 PROCEDURE — 85025 COMPLETE CBC W/AUTO DIFF WBC: CPT | Performed by: EMERGENCY MEDICINE

## 2019-09-14 PROCEDURE — 83690 ASSAY OF LIPASE: CPT | Performed by: EMERGENCY MEDICINE

## 2019-09-14 PROCEDURE — 74177 CT ABD & PELVIS W/CONTRAST: CPT | Mod: TC

## 2019-09-14 PROCEDURE — 25000128 H RX IP 250 OP 636: Performed by: EMERGENCY MEDICINE

## 2019-09-14 PROCEDURE — 85610 PROTHROMBIN TIME: CPT | Performed by: EMERGENCY MEDICINE

## 2019-09-14 PROCEDURE — 83605 ASSAY OF LACTIC ACID: CPT | Performed by: EMERGENCY MEDICINE

## 2019-09-14 PROCEDURE — 85730 THROMBOPLASTIN TIME PARTIAL: CPT | Performed by: EMERGENCY MEDICINE

## 2019-09-14 PROCEDURE — 25500064 ZZH RX 255 OP 636: Performed by: EMERGENCY MEDICINE

## 2019-09-14 PROCEDURE — 36415 COLL VENOUS BLD VENIPUNCTURE: CPT | Performed by: EMERGENCY MEDICINE

## 2019-09-14 PROCEDURE — 96375 TX/PRO/DX INJ NEW DRUG ADDON: CPT

## 2019-09-14 PROCEDURE — 80053 COMPREHEN METABOLIC PANEL: CPT | Performed by: EMERGENCY MEDICINE

## 2019-09-14 PROCEDURE — 25000132 ZZH RX MED GY IP 250 OP 250 PS 637: Performed by: EMERGENCY MEDICINE

## 2019-09-14 PROCEDURE — 81003 URINALYSIS AUTO W/O SCOPE: CPT | Performed by: EMERGENCY MEDICINE

## 2019-09-14 PROCEDURE — 82150 ASSAY OF AMYLASE: CPT | Performed by: EMERGENCY MEDICINE

## 2019-09-14 PROCEDURE — 96376 TX/PRO/DX INJ SAME DRUG ADON: CPT

## 2019-09-14 PROCEDURE — 96361 HYDRATE IV INFUSION ADD-ON: CPT

## 2019-09-14 PROCEDURE — 96374 THER/PROPH/DIAG INJ IV PUSH: CPT | Mod: XU

## 2019-09-14 RX ORDER — MORPHINE SULFATE 2 MG/ML
2 INJECTION, SOLUTION INTRAMUSCULAR; INTRAVENOUS ONCE
Status: COMPLETED | OUTPATIENT
Start: 2019-09-14 | End: 2019-09-14

## 2019-09-14 RX ORDER — ONDANSETRON 2 MG/ML
4 INJECTION INTRAMUSCULAR; INTRAVENOUS ONCE
Status: COMPLETED | OUTPATIENT
Start: 2019-09-14 | End: 2019-09-14

## 2019-09-14 RX ORDER — OXYCODONE AND ACETAMINOPHEN 5; 325 MG/1; MG/1
1-2 TABLET ORAL EVERY 4 HOURS PRN
Qty: 12 TABLET | Refills: 0 | Status: SHIPPED | OUTPATIENT
Start: 2019-09-14 | End: 2019-10-28

## 2019-09-14 RX ORDER — ONDANSETRON 4 MG/1
4 TABLET, ORALLY DISINTEGRATING ORAL EVERY 8 HOURS PRN
Qty: 10 TABLET | Refills: 0 | Status: SHIPPED | OUTPATIENT
Start: 2019-09-14 | End: 2019-10-28

## 2019-09-14 RX ORDER — IOPAMIDOL 612 MG/ML
30 INJECTION, SOLUTION INTRAVASCULAR ONCE
Status: COMPLETED | OUTPATIENT
Start: 2019-09-14 | End: 2019-09-14

## 2019-09-14 RX ADMIN — AMOXICILLIN AND CLAVULANATE POTASSIUM 1 TABLET: 875; 125 TABLET, FILM COATED ORAL at 03:18

## 2019-09-14 RX ADMIN — MORPHINE SULFATE 2 MG: 2 INJECTION, SOLUTION INTRAMUSCULAR; INTRAVENOUS at 02:12

## 2019-09-14 RX ADMIN — ONDANSETRON 4 MG: 2 INJECTION, SOLUTION INTRAMUSCULAR; INTRAVENOUS at 00:08

## 2019-09-14 RX ADMIN — IOPAMIDOL 100 ML: 612 INJECTION, SOLUTION INTRAVENOUS at 00:58

## 2019-09-14 RX ADMIN — SODIUM CHLORIDE 1000 ML: 9 INJECTION, SOLUTION INTRAVENOUS at 00:07

## 2019-09-14 RX ADMIN — ONDANSETRON 4 MG: 2 INJECTION, SOLUTION INTRAMUSCULAR; INTRAVENOUS at 02:23

## 2019-09-14 RX ADMIN — MORPHINE SULFATE 2 MG: 2 INJECTION, SOLUTION INTRAMUSCULAR; INTRAVENOUS at 00:10

## 2019-09-14 NOTE — ED NOTES
Pt comes in today via private vehicle with her spouse. Pt stated she's been up at the [place] and today was having some abdominal cramping, pt then stated it started to get worse then had some nausea and vomiting and then started to have some bloody stools. Pt reproted they are dark in color but brighter red on the toilet paper.  Pt stated she does have hx of diverticulitis.  Pt does feel chills.

## 2019-09-14 NOTE — ED NOTES
Discharge instructions given to patient. Pt also given 3 prescriptions ( Zofran, Augmentin & Percocet) to fill at pharmacy of choice in the morning.  Encouraged to return with new or worsening symptoms.  No questions or concerns. Pt spouse here to drive patient home. Copy of AVS in hand. Crackers and milk given prior to discharge.

## 2019-09-14 NOTE — ED NOTES
Pt back from imaging.  Did have some nausea but has subsided.  Pain is tolerable at 4/10.  Up to bathroom,   Warm blankets given and call light in hand

## 2019-09-14 NOTE — DISCHARGE INSTRUCTIONS
You have diverticulitis, take the antibiotic I prescribed for you.  You also may have an abnormality of your pancreas, you need to follow-up with your primary care doctor about this.  Come back you feel worse.      What to expect when you have contrast    During your exam, we will inject  contrast  into your vein or artery. (Contrast is a clear liquid with iodine in it. It shows up on X-rays.)    You may feel warm or hot. You may have a metal taste in your mouth and a slight upset stomach. You may also feel pressure near the kidneys and bladder. These effects will last about 1 to 3 minutes.    Please tell us if you have:   Sneezing    Itching   Hives    Swelling in the face   A hoarse voice   Breathing problems   Other new symptoms    Serious problems are rare.  They may include:   Irregular heartbeat    Seizures   Kidney failure             Tissue damage   Shock     Death    If you have any problems during the exam, we  will treat them right away.    When you get home    Call your hospital if you have any new symptoms in the next 2 days, like hives or swelling. (Phone numbers are at the bottom of this page.) Or call your family doctor.     If you have wheezing or trouble breathing, call 911.    Self-care  -Drink at least 4 extra glasses of water today.   This reduces the stress on your kidneys.  -Keep taking your regular medicines.    The contrast will pass out of your body in your  Urine(pee). This will happen in the next 24 hours. You  will not feel this. Your urine will not  change color.    If you have kidney problems or take metformin    Drink 4 to 8 large glasses of water for the next  2 days, if you are not on a fluid restriction.    ?If you take metformin (Glucophage or Glucovance) for diabetes, keep taking it.      ?Your kidney function tests are abnormal.  If you take Metformin, do not take it for 48 hours. Please go to your clinic for a blood test within 3 days after your exam before the restarting this  medicine.     (Note to provider:please give patient prescription for lab tests.)    ?Special instructions: -    I have read and understand the above information.    Patient Sign Here:______________________________________Date:________Time:______    Staff Sign Here:________________________________________Date:_______Time:______      Radiology Departments:     ?[facility]: [phone] ?[place]: [phone]     ?[place]: [phone] ?[facility]:[phone]      ?[place]: [phone]  ?[place]: [phone]  ?[place]:[phone]    ?[facility] [place]:[phone]  ?[facility]:[phone]

## 2019-09-14 NOTE — ED NOTES
DATE:  9/14/2019   TIME OF RECEIPT FROM LAB:  0010  LAB TEST:  Lactic  LAB VALUE:  2.2  RESULTS GIVEN WITH READ-BACK TO Dr Eduardo  TIME LAB VALUE REPORTED TO PROVIDER:   0010

## 2019-09-16 ENCOUNTER — OFFICE VISIT (OUTPATIENT)
Dept: FAMILY MEDICINE | Facility: OTHER | Age: 64
End: 2019-09-16
Attending: FAMILY MEDICINE
Payer: COMMERCIAL

## 2019-09-16 VITALS
OXYGEN SATURATION: 98 % | WEIGHT: 158 LBS | HEART RATE: 90 BPM | HEIGHT: 68 IN | SYSTOLIC BLOOD PRESSURE: 110 MMHG | TEMPERATURE: 98.2 F | DIASTOLIC BLOOD PRESSURE: 64 MMHG | BODY MASS INDEX: 23.95 KG/M2

## 2019-09-16 DIAGNOSIS — R19.7 BLOODY DIARRHEA: Primary | ICD-10-CM

## 2019-09-16 DIAGNOSIS — K86.1 GROOVE PANCREATITIS (H): ICD-10-CM

## 2019-09-16 PROCEDURE — 99214 OFFICE O/P EST MOD 30 MIN: CPT | Performed by: FAMILY MEDICINE

## 2019-09-16 PROCEDURE — G0463 HOSPITAL OUTPT CLINIC VISIT: HCPCS

## 2019-09-16 RX ORDER — FAMOTIDINE 20 MG
2 TABLET ORAL DAILY
COMMUNITY

## 2019-09-16 ASSESSMENT — MIFFLIN-ST. JEOR: SCORE: 1310.18

## 2019-09-16 ASSESSMENT — PAIN SCALES - GENERAL: PAINLEVEL: MODERATE PAIN (4)

## 2019-09-16 NOTE — PROGRESS NOTES
Subjective     Katelyn Manley is a 64 year old female who presents to clinic today for the following health issues:    HPI   ED/UC Followup:    Facility:  [facility] [place]  Date of visit: 9/13/19  Reason for visit: abdominal pain  Current Status: left sided abdominal pain, having bloody stools, worried about her pancreas     Had very severe abdominal cramps and pain in epigastric and to LUQ / LMQ  Increase diarrhea - moderate with blood  Rigors/chills - no fever   Never had diarrhea with other diverticulitis attacks  Was traveling - ?? 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              Current Outpatient Medications   Medication Sig Dispense Refill     albuterol (PROAIR HFA/PROVENTIL HFA/VENTOLIN HFA) 108 (90 Base) MCG/ACT inhaler Inhale 2 puffs into the lungs every 6 hours as needed for shortness of breath / dyspnea or wheezing 1 Inhaler 1     amoxicillin-clavulanate (AUGMENTIN) 875-125 MG tablet Take 1 tablet by mouth 2 times daily for 10 days 20 tablet 0     aspirin 81 MG chewable tablet Take 1 tablet (81 mg) by mouth daily 108 tablet 3     citalopram (CELEXA) 10 MG tablet Take 1 tablet (10 mg) by mouth daily 90 tablet 3     GLUCOSAMINE SULFATE PO Take 2,000 mg by mouth daily       Multiple Vitamins-Minerals (MULTIVITAMIN OR) Take 1 capsule by mouth daily.       nystatin-triamcinolone (MYCOLOG II) cream APPLY A PEA SIZE AMOUNT TO AFFECTED AREA AT BEDTIME AS DIRECTED 420 g PRN     ondansetron (ZOFRAN ODT) 4 MG ODT tab Take 1 tablet (4 mg) by mouth every 8 hours as needed for nausea 10 tablet 0     oxyCODONE-acetaminophen (PERCOCET) 5-325 MG tablet Take 1-2 tablets by mouth every 4 hours as needed for pain 12 tablet 0     Psyllium (METAMUCIL PO)        Vitamin D, Cholecalciferol, 1000 units CAPS Take 2 tablets by mouth daily           Reviewed and updated as  "needed this visit by Provider         Review of Systems   ROS COMP: Constitutional, HEENT, cardiovascular, pulmonary, gi and gu systems are negative, except as otherwise noted.      Objective    /64   Pulse 90   Temp 98.2  F (36.8  C)   Ht 1.727 m (5' 8\")   Wt 71.7 kg (158 lb)   SpO2 98%   BMI 24.02 kg/m    Body mass index is 24.02 kg/m .  Physical Exam   GENERAL: healthy, alert and no distress  RESP: lungs clear to auscultation - no rales, rhonchi or wheezes  CV: regular rate and rhythm, normal S1 S2, no S3 or S4, no murmur, click or rub, no peripheral edema and peripheral pulses strong  ABDOMEN: soft, mild diffuse tenderness with little more prominent in Epigastric to LLQ and LMQ, no hepatosplenomegaly, no masses and bowel sounds normal    No results found for this or any previous visit (from the past 48 hour(s)).    Admission on 09/13/2019, Discharged on 09/14/2019   Component Date Value Ref Range Status     WBC 09/14/2019 11.6* 4.0 - 11.0 10e9/L Final     RBC Count 09/14/2019 4.44  3.8 - 5.2 10e12/L Final     Hemoglobin 09/14/2019 12.7  11.7 - 15.7 g/dL Final     Hematocrit 09/14/2019 37.5  35.0 - 47.0 % Final     MCV 09/14/2019 85  78 - 100 fl Final     MCH 09/14/2019 28.6  26.5 - 33.0 pg Final     MCHC 09/14/2019 33.9  31.5 - 36.5 g/dL Final     RDW 09/14/2019 13.2  10.0 - 15.0 % Final     Platelet Count 09/14/2019 270  150 - 450 10e9/L Final     Diff Method 09/14/2019 Automated Method   Final     % Neutrophils 09/14/2019 62.7  % Final     % Lymphocytes 09/14/2019 28.7  % Final     % Monocytes 09/14/2019 5.5  % Final     % Eosinophils 09/14/2019 2.1  % Final     % Basophils 09/14/2019 0.6  % Final     % Immature Granulocytes 09/14/2019 0.4  % Final     Nucleated RBCs 09/14/2019 0  0 /100 Final     Absolute Neutrophil 09/14/2019 7.3  1.6 - 8.3 10e9/L Final     Absolute Lymphocytes 09/14/2019 3.3  0.8 - 5.3 10e9/L Final     Absolute Monocytes 09/14/2019 0.6  0.0 - 1.3 10e9/L Final     Absolute " Eosinophils 09/14/2019 0.2  0.0 - 0.7 10e9/L Final     Absolute Basophils 09/14/2019 0.1  0.0 - 0.2 10e9/L Final     Abs Immature Granulocytes 09/14/2019 0.1  0 - 0.4 10e9/L Final     Absolute Nucleated RBC 09/14/2019 0.0   Final     Sodium 09/14/2019 141  133 - 144 mmol/L Final     Potassium 09/14/2019 4.1  3.4 - 5.3 mmol/L Final     Chloride 09/14/2019 110* 94 - 109 mmol/L Final     Carbon Dioxide 09/14/2019 23  20 - 32 mmol/L Final     Anion Gap 09/14/2019 8  3 - 14 mmol/L Final     Glucose 09/14/2019 121* 70 - 99 mg/dL Final     Urea Nitrogen 09/14/2019 21  7 - 30 mg/dL Final     Creatinine 09/14/2019 0.87  0.52 - 1.04 mg/dL Final     GFR Estimate 09/14/2019 70  >60 mL/min/[1.73_m2] Final    Comment: Non  GFR Calc  Starting 12/18/2018, serum creatinine based estimated GFR (eGFR) will be   calculated using the Chronic Kidney Disease Epidemiology Collaboration   (CKD-EPI) equation.       GFR Estimate If Black 09/14/2019 81  >60 mL/min/[1.73_m2] Final    Comment:  GFR Calc  Starting 12/18/2018, serum creatinine based estimated GFR (eGFR) will be   calculated using the Chronic Kidney Disease Epidemiology Collaboration   (CKD-EPI) equation.       Calcium 09/14/2019 8.8  8.5 - 10.1 mg/dL Final     Bilirubin Total 09/14/2019 0.3  0.2 - 1.3 mg/dL Final     Albumin 09/14/2019 3.5  3.4 - 5.0 g/dL Final     Protein Total 09/14/2019 7.6  6.8 - 8.8 g/dL Final     Alkaline Phosphatase 09/14/2019 83  40 - 150 U/L Final     ALT 09/14/2019 28  0 - 50 U/L Final     AST 09/14/2019 26  0 - 45 U/L Final     Lipase 09/14/2019 158  73 - 393 U/L Final     Lactic Acid 09/14/2019 2.2* 0.7 - 2.0 mmol/L Final    Comment: Significant value called to and read back by  Laine Danielson at 0010 on 9/14/19 by BESSY       PTT 09/14/2019 25  24.00 - 37.00 sec Final     INR 09/14/2019 0.87  0.80 - 1.20 Final     Color Urine 09/14/2019 Straw   Final     Appearance Urine 09/14/2019 Clear   Final     Glucose Urine 09/14/2019  Negative  NEG^Negative mg/dL Final     Bilirubin Urine 09/14/2019 Negative  NEG^Negative Final     Ketones Urine 09/14/2019 Negative  NEG^Negative mg/dL Final     Specific Gravity Urine 09/14/2019 1.018  1.003 - 1.035 Final     Blood Urine 09/14/2019 Negative  NEG^Negative Final     pH Urine 09/14/2019 6.0  4.7 - 8.0 pH Final     Protein Albumin Urine 09/14/2019 Negative  NEG^Negative mg/dL Final     Urobilinogen mg/dL 09/14/2019 Normal  0.0 - 2.0 mg/dL Final     Nitrite Urine 09/14/2019 Negative  NEG^Negative Final     Leukocyte Esterase Urine 09/14/2019 Negative  NEG^Negative Final     Source 09/14/2019 Midstream Urine   Corrected    CORRECTED ON 09/14 AT 0124: PREVIOUSLY REPORTED AS Unspecified Urine     Amylase 09/14/2019 109  30 - 110 U/L Final     Lipase 09/14/2019 164  73 - 393 U/L Final     CT scan noted          Assessment & Plan     1. Bloody diarrhea  Possible due to food poisoning or infectious colitis by hx.  Not typical for her and her diverticulitis and no acute findings on CT scan.  Overall getting better. Finish abx and add probiotic.  ??? If need sooner colonoscopy than planned for 2022-- will send back to Dr SANCHEZ for discussion on this.  Pt agreed. Symptomatic treatment was discussed along when patient should call and/or come back into the clinic or go to ER/Urgent care. All questions answered.   F/u if getting wore   - GASTROENTEROLOGY ADULT REF CONSULT ONLY    2. Groove pancreatitis (H)  Discussed. Looked over last several CT scan.  Pt does not seem to have pancreas symptoms nor heavy drinker or other risk.  Discussed MRCP. Will see if can get see DR Sanchez and see what he says and what we should do for her for reassurance and work up along with further f/u.  Pt agrees.    - GASTROENTEROLOGY ADULT REF CONSULT ONLY     30 minutes was spent with patient and over 50%  of this time was spent on counseling patient regarding  illness, medication and / or treatment  options, coordinating further cares  and follow ups that are needed along with resource material that will be helpful in the treatment of these issues.       No follow-ups on file.    Robert Blakely MD  [facility]

## 2019-09-16 NOTE — NURSING NOTE
"Chief Complaint   Patient presents with     Er F/u       Initial /64   Pulse 90   Temp 98.2  F (36.8  C)   Ht 1.727 m (5' 8\")   Wt 71.7 kg (158 lb)   SpO2 98%   BMI 24.02 kg/m   Estimated body mass index is 24.02 kg/m  as calculated from the following:    Height as of this encounter: 1.727 m (5' 8\").    Weight as of this encounter: 71.7 kg (158 lb).  Medication Reconciliation: complete  "

## 2019-09-17 ASSESSMENT — ENCOUNTER SYMPTOMS
ABDOMINAL PAIN: 1
VOMITING: 0
HEMATOCHEZIA: 1
FATIGUE: 0
DYSURIA: 0

## 2019-09-17 NOTE — ED PROVIDER NOTES
History     Chief Complaint   Patient presents with     Abdominal Pain     The history is provided by the patient.   Abdominal Pain   Pain location:  LLQ  Pain quality: cramping    Pain radiates to:  Does not radiate  Pain severity:  Moderate  Onset quality:  Gradual  Timing:  Constant  Progression:  Worsening  Chronicity:  New  Context: not previous surgeries, not sick contacts and not trauma    Relieved by:  Nothing  Worsened by:  Nothing  Ineffective treatments:  None tried  Associated symptoms: hematochezia    Associated symptoms: no dysuria, no fatigue, no melena and no vomiting    Risk factors: obesity      Katelyn Manley is a 64 year old female who 64-year-old female past medical history of colitis, hysterectomy, here with a few days of left lower quadrant abdominal pain.  Thinks she has colitis, concerned she may have perforated.  Denies vomiting, admits to nausea and bright red rectal bleeding today.  No dysuria, frequency, back pain, fever.    Allergies:  Allergies   Allergen Reactions     Cats      Cat/feline product derivatives     Dogs      Horse-Derived Products      And cows       Problem List:    Patient Active Problem List    Diagnosis Date Noted     Diverticulosis of large intestine without hemorrhage 10/23/2018     Priority: Medium     H/O diverticulitis of colon 10/23/2018     Priority: Medium     Reactive airway disease that is not asthma 04/04/2018     Priority: Medium     Status post vaginal hysterectomy 09/12/2016     Priority: Medium     Bso,sling,post repair, [id number]       Dyslipidemia 05/15/2014     Priority: Medium     Lichen sclerosus 09/16/2013     Priority: Medium     Leukoplakia 07/03/2013     Priority: Medium     Pre-diabetes 05/06/2013     Priority: Medium        Past Medical History:    Past Medical History:   Diagnosis Date     Cyst of Bartholin's gland 10/22/2007     Diverticulitis of sigmoid colon 8/23/2011     Endometrial hyperplasia without atypia, simple      Glucose  intolerance (impaired glucose tolerance) 790.22     Hypercholesterolemia 8/23/2011     Menopause 5/6/2013     Osteoarthrosis, unspecified whether generalized or localized, lower leg 7/9/2002     S/P vaginal hysterectomy 2/19/2015     Special screening for malignant neoplasms, colon 10/15/2004     Status post total knee replacement 8/23/2011       Past Surgical History:    Past Surgical History:   Procedure Laterality Date     COLONOSCOPY  2004     COLONOSCOPY  2-    Dr Sanchez/ sigmoid diverticular dz     COLPORRHAPHY POSTERIOR N/A 2/18/2015    Procedure: COLPORRHAPHY POSTERIOR;  Surgeon: Vasquez Sauer MD;  Location: [facility]     CYSTOSCOPY N/A 2/18/2015    Procedure: CYSTOSCOPY;  Surgeon: Vasquez Sauer MD;  Location: [facility]     DILATION AND CURETTAGE, HYSTEROSCOPY DIAGNOSTIC, COMBINED  5/7/2014    Procedure: COMBINED DILATION AND CURETTAGE, HYSTEROSCOPY DIAGNOSTIC;  Surgeon: Vasquez Sauer MD;  Location: [facility]     EYE SURGERY Left 08/2016    macular hole repair     HYSTERECTOMY VAGINAL, BILATERAL SALPINGO-OOPHERECTOMY, COMBINED N/A 2/18/2015    Procedure: COMBINED HYSTERECTOMY VAGINAL, SALPINGO-OOPHORECTOMY;  Surgeon: Vasquez Sauer MD;  Location: [facility] OR     JOINT REPLACEMENT  unijoint    right     JOINT REPLACEMENT  full joint    x2 - left     PHACOEMULSIFICATION WITH STANDARD INTRAOCULAR LENS IMPLANT Left 4/24/2018    Procedure: PHACOEMULSIFICATION WITH STANDARD INTRAOCULAR LENS IMPLANT;  PHACOEMULSIFICATION CATARACT EXTRACTION POSTERIOR CHAMBER LENS LEFT/TECNIS-TORIC, 10% CORINA;  Surgeon: Jerome Snow MD;  Location: [facility]     SLING TRANSOBTURATOR N/A 2/18/2015    Procedure: SLING TRANSOBTURATOR;  Surgeon: Vasquez Sauer MD;  Location: [facility]       Family History:    Family History   Problem Relation Age of Onset     Cerebrovascular Disease Father 77        CVA - cause of death     Other - See Comments Mother         Wagoners Granulometosis - cause of death     Other - See Comments Brother         multiple  sclerosis       Social History:  Marital Status:   [2]  Social History     Tobacco Use     Smoking status: Never Smoker     Smokeless tobacco: Never Used   Substance Use Topics     Alcohol use: No     Alcohol/week: 0.0 oz     Drug use: No        Medications:      albuterol (PROAIR HFA/PROVENTIL HFA/VENTOLIN HFA) 108 (90 Base) MCG/ACT inhaler   amoxicillin-clavulanate (AUGMENTIN) 875-125 MG tablet   aspirin 81 MG chewable tablet   citalopram (CELEXA) 10 MG tablet   GLUCOSAMINE SULFATE PO   Multiple Vitamins-Minerals (MULTIVITAMIN OR)   nystatin-triamcinolone (MYCOLOG II) cream   ondansetron (ZOFRAN ODT) 4 MG ODT tab   oxyCODONE-acetaminophen (PERCOCET) 5-325 MG tablet   Psyllium (METAMUCIL PO)   Vitamin D, Cholecalciferol, 1000 units CAPS         Review of Systems   Constitutional: Negative for fatigue.   Gastrointestinal: Positive for abdominal pain and hematochezia. Negative for melena and vomiting.   Genitourinary: Negative for dysuria.   All other systems reviewed and are negative.      Physical Exam   BP: 155/99  Heart Rate: 74  Temp: 97.7  F (36.5  C)  Resp: 16  SpO2: 98 %      Physical Exam   Constitutional: No distress.   HENT:   Head: Atraumatic.   Mouth/Throat: Oropharynx is clear and moist. No oropharyngeal exudate.   Eyes: Pupils are equal, round, and reactive to light. No scleral icterus.   Cardiovascular: Normal heart sounds and intact distal pulses.   Pulmonary/Chest: Breath sounds normal. No respiratory distress.   Abdominal: Soft. Bowel sounds are normal. She exhibits no distension, no ascites and no mass. There is tenderness in the left lower quadrant.   Musculoskeletal: She exhibits no edema or tenderness.   Skin: Skin is warm. No rash noted. She is not diaphoretic.       ED Course        Procedures               Critical Care time:  none     The Lactic acid level is elevated due to dehydration, at this time there is no sign of severe sepsis or septic shock.         No results found for this  or any previous visit (from the past 24 hour(s)).    Medications   morphine (PF) injection 2 mg (2 mg Intravenous Given 9/14/19 0010)   0.9% sodium chloride BOLUS (0 mLs Intravenous Stopped 9/14/19 0042)   ondansetron (ZOFRAN) injection 4 mg (4 mg Intravenous Given 9/14/19 0008)   iopamidol (ISOVUE-300) IV solution 61% 30 mL (100 mLs Intravenous Given 9/14/19 0058)   sodium chloride (PF) 0.9% PF flush 60 mL (60 mLs Intravenous Given 9/14/19 0057)   morphine (PF) injection 2 mg (2 mg Intravenous Given 9/14/19 0212)   ondansetron (ZOFRAN) injection 4 mg (4 mg Intravenous Given 9/14/19 0223)   amoxicillin-clavulanate (AUGMENTIN) 875-125 MG per tablet 1 tablet (1 tablet Oral Given 9/14/19 0318)       Assessments & Plan (with Medical Decision Making)     I have reviewed the nursing notes.    I have reviewed the findings, diagnosis, plan and need for follow up with the patient.   Patient with CT findings consistent with colitis, will discharge on Augmentin.  Patient made aware of pancreatic abnormalities, recommended urgent follow-up with her primary as she may need work-up for pancreatic mass.    Discharge Medication List as of 9/14/2019  3:06 AM      START taking these medications    Details   amoxicillin-clavulanate (AUGMENTIN) 875-125 MG tablet Take 1 tablet by mouth 2 times daily for 10 days, Disp-20 tablet, R-0, Local Print      ondansetron (ZOFRAN ODT) 4 MG ODT tab Take 1 tablet (4 mg) by mouth every 8 hours as needed for nausea, Disp-10 tablet, R-0, Local Print      oxyCODONE-acetaminophen (PERCOCET) 5-325 MG tablet Take 1-2 tablets by mouth every 4 hours as needed for pain, Disp-12 tablet, R-0, Local Print             Final diagnoses:   Diverticulitis of colon       9/13/2019   [facility]     Rene Eduardo MD  09/17/19 0354     Post-Care Instructions: I reviewed with the patient in detail post-care instructions. Patient is to wear sunprotection, and avoid picking at any of the treated lesions. Pt may apply Vaseline to crusted or scabbing areas. Duration Of Freeze Thaw-Cycle (Seconds): 0 Consent: The patient's consent was obtained including but not limited to risks of crusting, scabbing, blistering, scarring, darker or lighter pigmentary change, recurrence, incomplete removal and infection. Detail Level: Detailed

## 2019-09-18 NOTE — PROGRESS NOTES
2019            Lan Sanchez MD   [facility]    [address] [place] [address]      PATIENT:  Katelyn Manley   MRN:  [id number]   :  1955      APPOINTMENT TIME:  Time 10/21 at 2:30.      RE:  Abnormal CT showing groove chronic pancreatitis.      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   The patient was recently seen in ER for what initially  sounded like a possible recurrent diverticulitis, though in my eyes it sounded more like food poisoning after going up the [place].  She ended up in the Emergency Room with some bloody diarrhea which  when I had seen her, she improved some, and I think she was on the mends.   Though I question if she should  need a repeat colonoscopy a little sooner  than what was planned  to be done in .  Also, at that time in the ER, she had a CT scan of her abdomen and compared to several other CTs that have been done in the past, it showed progression of what looks to be groove pancreatitis.  The patient has chronic changes on her CT scan, going back several years, but it has definitely progressed since her last CT scan done in .  No distinct mass was seen.  It was recommended by the radiologist to consider an MRI in 3 months for a followup.  Therefore, she was in my office  to discuss the CT scan and some of the anxiety that comes with that diagnosis of a fear of cancer.  In looking at her CT scan and listening to her symptoms,  we decided that we would send her to see you to consider an earlier repeat colonoscopy, but also decide on any further workup evaluation that needs to be done in her pancreas.  I did not do an MRI at this point in time since we were able to get an appt with you next month.  I would like your input on the two above issues.  Thank you for your time.         I appreciate  your help.         Sincerely,      RAUDEL SIMON MD             D: 2019   T: 2019   MT: MURRAY      Name:     NATACHA GUZMAN   MRN:      [id number]        Account:      [id number]   :      1955      Document: [id number]       cc: Lan Sanchez MD

## 2019-09-24 ENCOUNTER — TRANSFERRED RECORDS (OUTPATIENT)
Dept: HEALTH INFORMATION MANAGEMENT | Facility: CLINIC | Age: 64
End: 2019-09-24

## 2019-09-24 LAB
ALT SERPL-CCNC: 17 U/L (ref 18–65)
AST SERPL-CCNC: 23 U/L (ref 10–30)

## 2019-10-14 ENCOUNTER — ANCILLARY PROCEDURE (OUTPATIENT)
Dept: MAMMOGRAPHY | Facility: OTHER | Age: 64
End: 2019-10-14
Attending: FAMILY MEDICINE
Payer: COMMERCIAL

## 2019-10-14 DIAGNOSIS — Z12.31 VISIT FOR SCREENING MAMMOGRAM: ICD-10-CM

## 2019-10-14 PROCEDURE — 77063 BREAST TOMOSYNTHESIS BI: CPT | Mod: TC

## 2019-10-21 NOTE — PROGRESS NOTES
Subjective     Katelyn Manley is a 64 year old female who presents to clinic today for the following health issues:    HPI   Depression Followup    How are you doing with your depression since your last visit? No change    Are you having other symptoms that might be associated with depression? No    Have you had a significant life event?  No     Are you feeling anxious or having panic attacks?   No    Do you have any concerns with your use of alcohol or other drugs? No  Feeling better since starting antidepressants, not feeling as irritable  No side-effects from medications  No concerns about her depression      Social History     Tobacco Use     Smoking status: Never Smoker     Smokeless tobacco: Never Used   Substance Use Topics     Alcohol use: No     Alcohol/week: 0.0 standard drinks     Drug use: No     PHQ 12/27/2018 5/20/2019 10/28/2019   PHQ-9 Total Score 2 3 0   Q9: Thoughts of better off dead/self-harm past 2 weeks Not at all Not at all Not at all     PENELOPE-7 SCORE 12/27/2018 5/20/2019 10/28/2019   Total Score 0 0 0     Last PHQ-9 5/20/2019   1.  Little interest or pleasure in doing things 0   2.  Feeling down, depressed, or hopeless 0   3.  Trouble falling or staying asleep, or sleeping too much 1   4.  Feeling tired or having little energy 1   5.  Poor appetite or overeating 1   6.  Feeling bad about yourself 0   7.  Trouble concentrating 0   8.  Moving slowly or restless 0   Q9: Thoughts of better off dead/self-harm past 2 weeks 0   PHQ-9 Total Score 3   Difficulty at work, home, or with people Not difficult at all     PENELOPE-7  5/20/2019   1. Feeling nervous, anxious, or on edge 0   2. Not being able to stop or control worrying 0   3. Worrying too much about different things 0   4. Trouble relaxing 0   5. Being so restless that it is hard to sit still 0   6. Becoming easily annoyed or irritable 0   7. Feeling afraid, as if something awful might happen 0   PENELOPE-7 Total Score 0   If you checked any problems,  "how difficult have they made it for you to do your work, take care of things at home, or get along with other people? -       Suicide Assessment Five-step Evaluation and Treatment (SAFE-T)      How many servings of fruits and vegetables do you eat daily?  0-1    On average, how many sweetened beverages do you drink each day (soda, juice, sweet tea, etc)?   0    How many days per week do you miss taking your medication? 0        Abdominal Pain      Duration: yesterday    Description (location/character/radiation): left side and under left breast for few months       Associated flank pain: None    Intensity:  moderate    Accompanying signs and symptoms:        Fever/Chills: no        Gas/Bloating: YES       Nausea/vomitting: no        Diarrhea: no        Dysuria or Hematuria: no     History (previous similar pain/trauma/previous testing): Has colonoscopy coming up    Precipitating or alleviating factors:       Pain worse with eating/BM/urination: no       Pain relieved by BM: no     Therapies tried and outcome: None    LMP:  not applicable  Feeling bloated yesterday, then sharp LUQ abdominal pain below ribs rate 2/10. No radiation  Has not tried anything for the pain  Had felt bloated previous to pancreatitis pain she had had in past, but pain a lot more severe previously.  \"feels like a have a muscle kink in there\"  No trauma--did tip her son back in a chair and had to quickly react to grab him--?\"maybe pulled something\"  Painful when pushed on LUQ, minor pain when bending forward  Tends to be constipated, but takes metamucil and has been regular for quite some time. Last BM this morning, no improvement in pain  Does not feel like her typical diverticulitis pain, pain typically LLQ  Not been sick recently, no fevers, vomiting, nausea   No dysuria or hematuria  If did not have appt for above - would not come in  For this     MR pancreas scheduled for next Monday  Colonoscopy scheduled after that    Current Outpatient " "Medications   Medication Sig Dispense Refill     albuterol (PROAIR HFA/PROVENTIL HFA/VENTOLIN HFA) 108 (90 Base) MCG/ACT inhaler Inhale 2 puffs into the lungs every 6 hours as needed for shortness of breath / dyspnea or wheezing 1 Inhaler 1     aspirin 81 MG chewable tablet Take 1 tablet (81 mg) by mouth daily 108 tablet 3     citalopram (CELEXA) 10 MG tablet Take 1 tablet (10 mg) by mouth daily 90 tablet 3     GLUCOSAMINE SULFATE PO Take 2,000 mg by mouth daily       Multiple Vitamins-Minerals (MULTIVITAMIN OR) Take 1 capsule by mouth daily.       nystatin-triamcinolone (MYCOLOG II) cream APPLY A PEA SIZE AMOUNT TO AFFECTED AREA AT BEDTIME AS DIRECTED 420 g PRN     Psyllium (METAMUCIL PO)        Vitamin D, Cholecalciferol, 1000 units CAPS Take 2 tablets by mouth daily       Allergies   Allergen Reactions     Cats      Cat/feline product derivatives     Dogs      Horse-Derived Products      And cows         Reviewed and updated as needed this visit by Provider         Review of Systems   ROS COMP: Constitutional, HEENT, cardiovascular, pulmonary, gi and gu systems are negative, except as otherwise noted.      Objective    /64   Pulse 84   Temp 98.1  F (36.7  C)   Ht 1.727 m (5' 8\")   Wt 72.1 kg (159 lb)   SpO2 96%   BMI 24.18 kg/m    Body mass index is 24.18 kg/m .  Physical Exam   GENERAL: healthy, alert and no distress  RESP: lungs clear to auscultation - no rales, rhonchi or wheezes  CV: regular rate and rhythm, normal S1 S2, no S3 or S4, no murmur, click or rub, no peripheral edema and peripheral pulses strong  ABDOMEN: soft, tender in LUQ, no rebound or guarding, no hepatosplenomegaly, no masses and bowel sounds normal. Small lipoma noted in LUQ abdomen under left breast.  PSYCH: mentation appears normal, affect normal/bright         Assessment & Plan      1. Adjustment disorder with mixed anxiety and depressed mood  Stable, well controlled with current medication. No medication side-effects. " Patient agree to continue medication. May need more long term   - citalopram (CELEXA) 10 MG tablet; Take 1 tablet (10 mg) by mouth daily  Dispense: 90 tablet; Refill: 3    2. LUQ abdominal pain  Etiology not entirely clear. Possible muscle strain from trying to catch her grandchild from falling back in chair. Possible early diverticulitis or pancreatitis--though unlikely with current presentation. Discussed this with patient and agreed that we would just monitor the pain for now to see if pain goes away in the next couple days. Symptomatic treatment was discussed along with when patient should call and/or come back into the clinic or go to ER/Urgent care. All questions answered. Patient told to call/make appointment if pain worsens--would consider treating for diverticulitis.       I, Marcello Casas , saw and examined the patient in the presence of Dr. Zora Casas, MS3       I was present with the medical student who participated in the service and in the documentation of the note. I have verified the history and personally performed the physical exam and medical decision making. I agree with the assessment and plan of care as documented in the note.     Robert Blakely MD    No follow-ups on file.    Robert Blakely MD  [facility]

## 2019-10-28 ENCOUNTER — OFFICE VISIT (OUTPATIENT)
Dept: FAMILY MEDICINE | Facility: OTHER | Age: 64
End: 2019-10-28
Attending: FAMILY MEDICINE
Payer: COMMERCIAL

## 2019-10-28 VITALS
WEIGHT: 159 LBS | HEIGHT: 68 IN | DIASTOLIC BLOOD PRESSURE: 64 MMHG | BODY MASS INDEX: 24.1 KG/M2 | OXYGEN SATURATION: 96 % | HEART RATE: 84 BPM | SYSTOLIC BLOOD PRESSURE: 102 MMHG | TEMPERATURE: 98.1 F

## 2019-10-28 DIAGNOSIS — R10.12 LUQ ABDOMINAL PAIN: Primary | ICD-10-CM

## 2019-10-28 DIAGNOSIS — F43.23 ADJUSTMENT DISORDER WITH MIXED ANXIETY AND DEPRESSED MOOD: ICD-10-CM

## 2019-10-28 PROCEDURE — 99213 OFFICE O/P EST LOW 20 MIN: CPT | Performed by: FAMILY MEDICINE

## 2019-10-28 PROCEDURE — G0463 HOSPITAL OUTPT CLINIC VISIT: HCPCS

## 2019-10-28 RX ORDER — CITALOPRAM HYDROBROMIDE 10 MG/1
10 TABLET ORAL DAILY
Qty: 90 TABLET | Refills: 3 | Status: SHIPPED | OUTPATIENT
Start: 2019-10-28 | End: 2020-06-01

## 2019-10-28 ASSESSMENT — ANXIETY QUESTIONNAIRES
7. FEELING AFRAID AS IF SOMETHING AWFUL MIGHT HAPPEN: NOT AT ALL
6. BECOMING EASILY ANNOYED OR IRRITABLE: NOT AT ALL
4. TROUBLE RELAXING: NOT AT ALL
GAD7 TOTAL SCORE: 0
1. FEELING NERVOUS, ANXIOUS, OR ON EDGE: NOT AT ALL
3. WORRYING TOO MUCH ABOUT DIFFERENT THINGS: NOT AT ALL
2. NOT BEING ABLE TO STOP OR CONTROL WORRYING: NOT AT ALL
5. BEING SO RESTLESS THAT IT IS HARD TO SIT STILL: NOT AT ALL

## 2019-10-28 ASSESSMENT — PATIENT HEALTH QUESTIONNAIRE - PHQ9: SUM OF ALL RESPONSES TO PHQ QUESTIONS 1-9: 0

## 2019-10-28 ASSESSMENT — PAIN SCALES - GENERAL: PAINLEVEL: NO PAIN (0)

## 2019-10-28 ASSESSMENT — MIFFLIN-ST. JEOR: SCORE: 1314.72

## 2019-10-28 NOTE — NURSING NOTE
"Chief Complaint   Patient presents with     Depression       Initial /64   Pulse 84   Temp 98.1  F (36.7  C)   Ht 1.727 m (5' 8\")   Wt 72.1 kg (159 lb)   SpO2 96%   BMI 24.18 kg/m   Estimated body mass index is 24.18 kg/m  as calculated from the following:    Height as of this encounter: 1.727 m (5' 8\").    Weight as of this encounter: 72.1 kg (159 lb).  Medication Reconciliation: complete  Klaus Gillette LPN  "

## 2019-10-29 ASSESSMENT — ANXIETY QUESTIONNAIRES: GAD7 TOTAL SCORE: 0

## 2019-10-30 ENCOUNTER — TELEPHONE (OUTPATIENT)
Dept: FAMILY MEDICINE | Facility: OTHER | Age: 64
End: 2019-10-30

## 2019-10-30 NOTE — TELEPHONE ENCOUNTER
Notified patient of note below by PCP. Verbalized understanding. No other questions or concerns at this time.

## 2019-10-30 NOTE — TELEPHONE ENCOUNTER
Labs not done since had all labs done earlier in ER.  Lipids have been on minimally elevated side - will recheck next year or two.    Fall should not cause any changes in pancreas and her upcoming MRI    Hold aspirin / supplements one week before colonoscopy     I do not need to see for back if doing ok- more than happy to see if so wish.

## 2019-10-30 NOTE — TELEPHONE ENCOUNTER
"8:49 AM    Reason for Call: Phone Call    Description: Patient called and states that she had a physical on 10-28-19 and never got any labs ordered. She is wondering if she should have any done.   Also is saying that she has a colonoscopy on November 15th and is wondering if there are any medications she should be holding.  Patient also reports that she fell yesterday on her table. Says that she \"feels like she is ok but her back is sore\". She is unsure if PCP wants to see her for this or not. Full range of motion and can complete tasks without issue. She is wondering if her fall would have any effect on her MRI on Monday for pancreas inflammation.  Please advise. Thank you.     Was an appointment offered for this call? Declines right now- unless PCP wants to see her    Preferred method for responding to this message: Telephone Call  What is your phone number ? [phone]    If we cannot reach you directly, may we leave a detailed response at the number you provided? Yes    Can this message wait until your PCP/provider returns, if available today? Not applicable-PCP in    Saadia Paredes RN  "

## 2019-11-15 ENCOUNTER — TRANSFERRED RECORDS (OUTPATIENT)
Dept: HEALTH INFORMATION MANAGEMENT | Facility: CLINIC | Age: 64
End: 2019-11-15

## 2020-03-02 ENCOUNTER — HEALTH MAINTENANCE LETTER (OUTPATIENT)
Age: 65
End: 2020-03-02

## 2020-05-21 ENCOUNTER — VIRTUAL VISIT (OUTPATIENT)
Dept: FAMILY MEDICINE | Facility: OTHER | Age: 65
End: 2020-05-21
Attending: FAMILY MEDICINE
Payer: COMMERCIAL

## 2020-05-21 ENCOUNTER — NURSE TRIAGE (OUTPATIENT)
Dept: FAMILY MEDICINE | Facility: OTHER | Age: 65
End: 2020-05-21

## 2020-05-21 VITALS — WEIGHT: 165 LBS | BODY MASS INDEX: 25.09 KG/M2

## 2020-05-21 DIAGNOSIS — R21 RASH: ICD-10-CM
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
## 2020-05-21 PROCEDURE — 99213 OFFICE O/P EST LOW 20 MIN: CPT | Mod: TEL | Performed by: FAMILY MEDICINE

## 2020-05-21 RX ORDER — DOXYCYCLINE HYCLATE 100 MG
100 TABLET ORAL 2 TIMES DAILY
Qty: 20 TABLET | Refills: 0 | Status: SHIPPED | OUTPATIENT
Start: 2020-05-21 | End: 2020-05-31

## 2020-05-21 NOTE — PROGRESS NOTES
"Katelyn Manley is a 65 year old female who is being evaluated via a billable telephone visit.      The patient has been notified of following:     \"This telephone visit will be conducted via a call between you and your physician/provider. We have found that certain health care needs can be provided without the need for a physical exam.  This service lets us provide the care you need with a short phone conversation.  If a prescription is necessary we can send it directly to your pharmacy.  If lab work is needed we can place an order for that and you can then stop by our lab to have the test done at a later time.    Telephone visits are billed at different rates depending on your insurance coverage. During this emergency period, for some insurers they may be billed the same as an in-person visit.  Please reach out to your insurance provider with any questions.    If during the course of the call the physician/provider feels a telephone visit is not appropriate, you will not be charged for this service.\"    Patient has given verbal consent for Telephone visit?  Yes    What phone number would you like to be contacted at? [phone]    How would you like to obtain your AVS? [place]    Subjective     Katelyn Manley is a 65 year old female who presents via phone visit today for the following health issues:    HPI  Tick bite      Duration: 2 weeks ago;     Description (location/character/radiation): back of neck;  removed; seemed fine; then past few days symptoms started; fatigued; chilled last night; unsure about temp; achey joints, headache; this morning back of neck is all red, feels warm; joints ache; no bulls eye; hard to tell - is in hairline; thinks it was a regular wood tick, but unsure    Intensity:  moderate    Accompanying signs and symptoms: neck, shoulders, upper back and head ache the past few days. Joints hurt a lot. stomache ache. Site of bite is red and warm to touch.    History (similar " episodes/previous evaluation): None    Precipitating or alleviating factors: None    Therapies tried and outcome: bacitracin    No recent antibiotics       Current Outpatient Medications   Medication     albuterol (PROAIR HFA/PROVENTIL HFA/VENTOLIN HFA) 108 (90 Base) MCG/ACT inhaler     aspirin 81 MG chewable tablet     citalopram (CELEXA) 10 MG tablet     doxycycline hyclate (VIBRA-TABS) 100 MG tablet     GLUCOSAMINE SULFATE PO     Multiple Vitamins-Minerals (MULTIVITAMIN OR)     nystatin-triamcinolone (MYCOLOG II) cream     Psyllium (METAMUCIL PO)     Vitamin D, Cholecalciferol, 1000 units CAPS     No current facility-administered medications for this visit.        Patient Active Problem List   Diagnosis     Pre-diabetes     Leukoplakia     Lichen sclerosus     Dyslipidemia     Status post vaginal hysterectomy     Reactive airway disease that is not asthma     Diverticulosis of large intestine without hemorrhage     H/O diverticulitis of colon     Past Surgical History:   Procedure Laterality Date     COLONOSCOPY  2004     COLONOSCOPY  2-    Dr Sanchez/ sigmoid diverticular dz     COLPORRHAPHY POSTERIOR N/A 2/18/2015    Procedure: COLPORRHAPHY POSTERIOR;  Surgeon: Vasquez Sauer MD;  Location: [facility]     CYSTOSCOPY N/A 2/18/2015    Procedure: CYSTOSCOPY;  Surgeon: Vasquez Sauer MD;  Location: [facility]     DILATION AND CURETTAGE, HYSTEROSCOPY DIAGNOSTIC, COMBINED  5/7/2014    Procedure: COMBINED DILATION AND CURETTAGE, HYSTEROSCOPY DIAGNOSTIC;  Surgeon: Vasquez Sauer MD;  Location: [facility]     EYE SURGERY Left 08/2016    macular hole repair     HYSTERECTOMY VAGINAL, BILATERAL SALPINGO-OOPHERECTOMY, COMBINED N/A 2/18/2015    Procedure: COMBINED HYSTERECTOMY VAGINAL, SALPINGO-OOPHORECTOMY;  Surgeon: Vasquez Sauer MD;  Location: [facility]     JOINT REPLACEMENT  unijoint    right     JOINT REPLACEMENT  full joint    x2 - left     PHACOEMULSIFICATION WITH STANDARD INTRAOCULAR LENS IMPLANT Left 4/24/2018    Procedure:  PHACOEMULSIFICATION WITH STANDARD INTRAOCULAR LENS IMPLANT;  PHACOEMULSIFICATION CATARACT EXTRACTION POSTERIOR CHAMBER LENS LEFT/TECNIS-TORIC, 10% CORINA;  Surgeon: Jerome Snow MD;  Location: [facility]     SLING TRANSOBTURATOR N/A 2/18/2015    Procedure: SLING TRANSOBTURATOR;  Surgeon: Vasquez Sauer MD;  Location: [facility]       Social History     Tobacco Use     Smoking status: Never Smoker     Smokeless tobacco: Never Used   Substance Use Topics     Alcohol use: No     Alcohol/week: 0.0 standard drinks     Family History   Problem Relation Age of Onset     Cerebrovascular Disease Father 77        CVA - cause of death     Other - See Comments Mother         Glenn Granulometosis - cause of death     Other - See Comments Brother         multiple sclerosis           Reviewed and updated as needed this visit by Provider  Tobacco  Allergies  Meds  Med Hx  Surg Hx  Fam Hx  Soc Hx        Review of Systems   Constitutional, HEENT, cardiovascular, pulmonary, gi and gu systems are negative, except as otherwise noted.       Objective   Reported vitals:  Wt 74.8 kg (165 lb)   BMI 25.09 kg/m     healthy, alert and no distress  PSYCH: Alert and oriented times 3; coherent speech, normal   rate and volume, able to articulate logical thoughts, able   to abstract reason, no tangential thoughts, no hallucinations   or delusions  Her affect is normal  RESP: No cough, no audible wheezing, able to talk in full sentences  Remainder of exam unable to be completed due to telephone visits    Diagnostic Test Results:  none         Assessment/Plan:  1. Tick bite, initial encounter  Discussed possibility of tick born illness, vs other processes - cellulitis, virus, etc.  Givens symptoms, unclear duration of attachment, will treat with Doxycyline.  Did discuss labs and evaluation if not improving or if progressing - fevers, joint swelling, etc.  Patient comfortable with plan.  - doxycycline hyclate (VIBRA-TABS) 100 MG tablet; Take 1  tablet (100 mg) by mouth 2 times daily for 10 days  Dispense: 20 tablet; Refill: 0    2. Rash  As above- tick etiology vs cellulitis  - doxycycline hyclate (VIBRA-TABS) 100 MG tablet; Take 1 tablet (100 mg) by mouth 2 times daily for 10 days  Dispense: 20 tablet; Refill: 0    Patient Instructions   Complete antibiotic course.  Daily skin checks.  Follow up with progression of symptoms for lab/evaluation.            Phone call duration:  6 minutes    Purnima Lambert MD

## 2020-05-21 NOTE — TELEPHONE ENCOUNTER
"Protocol advises to be seen within 3 days. Patient scheduled today for a telephone visit.    Additional Information    Negative: Severe difficulty breathing (e.g., struggling for each breath, speaks in single words)    Negative: Shock suspected (e.g., cold/pale/clammy skin, too weak to stand, low BP, rapid pulse)    Negative: Difficult to awaken or acting confused (e.g., disoriented, slurred speech)    Negative: [1] Fainted > 15 minutes ago AND [2] still feels too weak or dizzy to stand    Negative: [1] SEVERE weakness (i.e., unable to walk or barely able to walk, requires support) AND     [2] new onset or worsening    Negative: Sounds like a life-threatening emergency to the triager    Negative: Weakness of the face, arm or leg on one side of the body    Negative: [1] Has diabetes (diabetes mellitus) AND [2] weakness from low blood sugar (i.e., < 60 mg/dl or 3.5 mmol/l)    Negative: Heat exhaustion suspected (i.e., dehydration from heat exposure)    Negative: Vomiting is main symptom    Negative: Diarrhea is main symptom    Negative: Difficulty breathing    Negative: Heart beating < 50 beats per minute OR > 140 beats per minute    Negative: Extra heart beats OR irregular heart beating   (i.e., \"palpitations\")    Negative: Follows bleeding (e.g., from vomiting, rectum, vagina; Exception: small brief weakness from sight of a small amount blood)    Negative: Black or tarry bowel movements    Negative: [1] Drinking very little AND [2] dehydration suspected (e.g., no urine > 12 hours, very dry mouth, very lightheaded)    Negative: Patient sounds very sick or weak to the triager    Negative: [1] MODERATE weakness (i.e., interferes with work, school, normal activities) AND [2] cause unknown  (Exceptions: weakness with acute minor illness, or weakness from poor fluid intake)    Negative: [1] MODERATE weakness AND [2] from poor fluid intake AND [3] no improvement after 2 hours of rest and fluids    Negative: [1] Fever > 103 " "F (39.4 C) AND [2] not able to get the fever down using Fever Care Advice    Negative: [1] Fever > 101 F (38.3 C) AND [2] age > 60    Negative: [1] Fever > 100.0 F (37.8 C) AND [2] bedridden (e.g., nursing home patient, CVA, chronic illness, recovering from surgery)    Negative: [1] Fever > 100.0 F (37.8 C) AND [2] diabetes mellitus or weak immune system (e.g., HIV positive, cancer chemo, splenectomy, organ transplant, chronic steroids)    Negative: Pale skin (pallor)    Negative: [1] MODERATE weakness (i.e., interferes with work, school, normal activities) AND [2] persists > 3 days    Negative: Taking a medicine that could cause weakness (e.g., blood pressure medications, diuretics)    Negative: [1] MILD weakness (i.e., does not interfere with ability to work, go to school, normal activities) AND [2] persists > 1 week    [1] Fatigue (i.e., tires easily, decreased energy) AND [2] persists > 1 week    Answer Assessment - Initial Assessment Questions  1. DESCRIPTION: \"Describe how you are feeling.\"      Fatigued and exhausted  2. SEVERITY: \"How bad is it?\"  \"Can you stand and walk?\"    - MILD - Feels weak or tired, but does not interfere with work, school or normal activities    - MODERATE - Able to stand and walk; weakness interferes with work, school, or normal activities    - SEVERE - Unable to stand or walk      Mild  3. ONSET:  \"When did the weakness begin?\"      A week or so ago  4. CAUSE: \"What do you think is causing the weakness?\"      Unsure  5. MEDICINES: \"Have you recently started a new medicine or had a change in the amount of a medicine?\"      No  6. OTHER SYMPTOMS: \"Do you have any other symptoms?\" (e.g., chest pain, fever, cough, SOB, vomiting, diarrhea, bleeding, other areas of pain)      Headache, body aches and chills,   7. PREGNANCY: \"Is there any chance you are pregnant?\" \"When was your last menstrual period?\"      No    Protocols used: WEAKNESS (GENERALIZED) AND FATIGUE-A-AH      "

## 2020-05-21 NOTE — PATIENT INSTRUCTIONS
Complete antibiotic course.  Daily skin checks.  Follow up with progression of symptoms for lab/evaluation.

## 2020-05-21 NOTE — NURSING NOTE
"Chief Complaint   Patient presents with     Insect Bites       Initial Wt 74.8 kg (165 lb)   BMI 25.09 kg/m   Estimated body mass index is 25.09 kg/m  as calculated from the following:    Height as of 10/28/19: 1.727 m (5' 8\").    Weight as of this encounter: 74.8 kg (165 lb).  Medication Reconciliation: complete  Desiree Banerjee MA  "

## 2020-05-27 DIAGNOSIS — F43.23 ADJUSTMENT DISORDER WITH MIXED ANXIETY AND DEPRESSED MOOD: ICD-10-CM

## 2020-06-01 RX ORDER — CITALOPRAM HYDROBROMIDE 10 MG/1
TABLET ORAL
Qty: 90 TABLET | Refills: 3 | Status: SHIPPED | OUTPATIENT
Start: 2020-06-01 | End: 2021-05-24

## 2020-06-01 NOTE — TELEPHONE ENCOUNTER
Patient is out of medication     citalopram (CELEXA) 10 MG tablet       Last Written Prescription Date:  10/28/19  Last Fill Quantity: 90,   # refills: 3  Last Office Visit: 5/21/20 Virtual Visit  Future Office visit:       Routing refill request to provider for review/approval because:    Protocol Failed due to:     PHQ-9 score less than 5 in past 6 months         PHQ 12/27/2018 5/20/2019 10/28/2019   PHQ-9 Total Score 2 3 0   Q9: Thoughts of better off dead/self-harm past 2 weeks Not at all Not at all Not at all

## 2020-09-01 DIAGNOSIS — R21 RASH AND NONSPECIFIC SKIN ERUPTION: ICD-10-CM

## 2020-09-01 NOTE — TELEPHONE ENCOUNTER
Nystatin-Triamcinolone 517250-1.1 unit/gm %      Last Written Prescription Date:  5/17/18  Last Fill Quantity: 420 g,   # refills: 0  Last Office Visit: 5/21/20  Future Office visit:       Routing refill request to provider for review/approval because:

## 2020-09-02 RX ORDER — NYSTATIN AND TRIAMCINOLONE ACETONIDE 100000; 1 [USP'U]/G; MG/G
CREAM TOPICAL
Qty: 180 G | Status: SHIPPED | OUTPATIENT
Start: 2020-09-02 | End: 2021-12-27

## 2020-09-25 ENCOUNTER — TELEPHONE (OUTPATIENT)
Dept: FAMILY MEDICINE | Facility: OTHER | Age: 65
End: 2020-09-25

## 2020-09-25 ENCOUNTER — OFFICE VISIT (OUTPATIENT)
Dept: FAMILY MEDICINE | Facility: OTHER | Age: 65
End: 2020-09-25
Attending: FAMILY MEDICINE
Payer: COMMERCIAL

## 2020-09-25 VITALS
HEART RATE: 83 BPM | TEMPERATURE: 98.8 F | OXYGEN SATURATION: 94 % | SYSTOLIC BLOOD PRESSURE: 112 MMHG | WEIGHT: 158 LBS | BODY MASS INDEX: 23.95 KG/M2 | HEIGHT: 68 IN | DIASTOLIC BLOOD PRESSURE: 62 MMHG

## 2020-09-25 DIAGNOSIS — K57.32 DIVERTICULITIS OF COLON: Primary | ICD-10-CM

## 2020-09-25 PROCEDURE — 99213 OFFICE O/P EST LOW 20 MIN: CPT | Performed by: FAMILY MEDICINE

## 2020-09-25 PROCEDURE — G0463 HOSPITAL OUTPT CLINIC VISIT: HCPCS

## 2020-09-25 ASSESSMENT — ANXIETY QUESTIONNAIRES
6. BECOMING EASILY ANNOYED OR IRRITABLE: NOT AT ALL
4. TROUBLE RELAXING: NOT AT ALL
3. WORRYING TOO MUCH ABOUT DIFFERENT THINGS: NOT AT ALL
7. FEELING AFRAID AS IF SOMETHING AWFUL MIGHT HAPPEN: NOT AT ALL
2. NOT BEING ABLE TO STOP OR CONTROL WORRYING: NOT AT ALL
1. FEELING NERVOUS, ANXIOUS, OR ON EDGE: NOT AT ALL
GAD7 TOTAL SCORE: 0
5. BEING SO RESTLESS THAT IT IS HARD TO SIT STILL: NOT AT ALL

## 2020-09-25 ASSESSMENT — MIFFLIN-ST. JEOR: SCORE: 1305.18

## 2020-09-25 ASSESSMENT — PATIENT HEALTH QUESTIONNAIRE - PHQ9: SUM OF ALL RESPONSES TO PHQ QUESTIONS 1-9: 0

## 2020-09-25 ASSESSMENT — PAIN SCALES - GENERAL: PAINLEVEL: MILD PAIN (2)

## 2020-09-25 NOTE — NURSING NOTE
"Chief Complaint   Patient presents with     Abdominal Pain       Initial /62   Pulse 83   Temp 98.8  F (37.1  C)   Ht 1.727 m (5' 8\")   Wt 71.7 kg (158 lb)   SpO2 94%   BMI 24.02 kg/m   Estimated body mass index is 24.02 kg/m  as calculated from the following:    Height as of this encounter: 1.727 m (5' 8\").    Weight as of this encounter: 71.7 kg (158 lb).  Medication Reconciliation: complete  Klaus Gillette LPN  "

## 2020-09-25 NOTE — TELEPHONE ENCOUNTER
7:47 AM    Reason for Call: Phone Call    Description: pt states her Diverticulitis is flaring up and if she can't get appt can you just do a Rx/ please advise    Was an appointment offered for this call? No  If yes : Appointment type              Date    Preferred method for responding to this message: Telephone Call  What is your phone number ? [phone]    If we cannot reach you directly, may we leave a detailed response at the number you provided? Yes    Can this message wait until your PCP/provider returns, if available today? YES, No, provider is in  Stephanie Das

## 2020-09-25 NOTE — PROGRESS NOTES
"Subjective     Katelyn Manley is a 65 year old female who presents to clinic today for the following health issues:    HPI       Abdominal/Flank Pain  Onset/Duration: 2 days  Description:   Character: Dull ache and Gnawing  Location: left lower quadrant  Radiation: Back  Intensity: moderate  Progression of Symptoms:  improving  Accompanying Signs & Symptoms:  Fever/Chills: YES  Gas/Bloating: YES  Nausea: YES  Vomitting: YES  Diarrhea: no  Constipation: YES  Dysuria or Hematuria: no  History:   Trauma: no  Previous similar pain: YES  Previous tests done: CT  Precipitating factors:   Does the pain change with:     Food: YES    Bowel Movement: no    Urination: no   Other factors:  no  Therapies tried and outcome: tramadol  No LMP recorded. Patient has had a hysterectomy.    Started yesterday and slowly getting worse  Tried Metamucil and some old Tramadol  Had chills and pain  Some better this morning  NO fever  Has had 4-5 episodes of diverticulitis           Review of Systems   Constitutional, HEENT, cardiovascular, pulmonary, gi and gu systems are negative, except as otherwise noted.      Objective    /62   Pulse 83   Temp 98.8  F (37.1  C)   Ht 1.727 m (5' 8\")   Wt 71.7 kg (158 lb)   SpO2 94%   BMI 24.02 kg/m    Body mass index is 24.02 kg/m .  Physical Exam   GENERAL: healthy, alert and no distress  RESP: lungs clear to auscultation - no rales, rhonchi or wheezes  CV: regular rate and rhythm, normal S1 S2, no S3 or S4, no murmur, click or rub, no peripheral edema and peripheral pulses strong  ABDOMEN: soft, nontender, no hepatosplenomegaly, no masses and bowel sounds normal  MS: no gross musculoskeletal defects noted, no edema            Assessment & Plan     Diverticulitis of colon  Will treat.  Seems like her classic sx. Symptomatic treatment was discussed along when patient should call and/or come back into the clinic or go to ER/Urgent care. All questions answered.   Will also give a RF to have " available if happens again.   - amoxicillin-clavulanate (AUGMENTIN) 875-125 MG tablet; Take 1 tablet by mouth 2 times daily           No follow-ups on file.    Robert Blakely MD  [facility] [place]

## 2020-09-26 ASSESSMENT — ANXIETY QUESTIONNAIRES: GAD7 TOTAL SCORE: 0

## 2020-10-19 NOTE — PROGRESS NOTES
"SUBJECTIVE:   Katelyn Manley is a 65 year old female who presents for Preventive Visit.    {Split Bill scripting  The purpose of this visit is to discuss your medical history and prevent health problems before you are sick. You may be responsible for a co-pay, coinsurance, or deductible if your visit today includes services such as checking on a sore throat, having an x-ray or lab test, or treating and evaluating a new or existing condition :972100}  Patient has been advised of split billing requirements and indicates understanding: {Yes and No:790326}  Are you in the first 12 months of your Medicare Part B coverage?  { :135037::\"No\"}    Physical Health:    In general, how would you rate your overall physical health? { :309335}    Outside of work, how many days during the week do you exercise? { :165886}    Outside of work, approximately how many minutes a day do you exercise?{ :936371}    If you drink alcohol do you typically have >3 drinks per day or >7 drinks per week? { :928539}    Do you usually eat at least 4 servings of fruit and vegetables a day, include whole grains & fiber and avoid regularly eating high fat or \"junk\" foods? { :361877::\"Yes\"}    Do you have any problems taking medications regularly?  { :226683::\"No\"}    Do you have any side effects from medications? { :701991}    Needs assistance for the following daily activities: { :422423}    Which of the following safety concerns are present in your home?  { :578028::\"none identified\"}     Hearing impairment: { :253618}    In the past 6 months, have you been bothered by leaking of urine? { :496366}    Mental Health:    In general, how would you rate your overall mental or emotional health? { :647817}  PHQ-2 Score:      Do you feel safe in your environment? { :522493}    Have you ever done Advance Care Planning? (For example, a Health Directive, POLST, or a discussion with a medical provider or your loved ones about your wishes): { " ":644034}    Additional concerns to address?  {If YES, notify patient they may be responsible for a copay, coinsurance or deductible if the visit today includes services such as checking on a sore throat, having an x-ray or lab test, or treating and evaluating a new or existing condition :718640::\"No\"}    Fall risk:  { :857866}  {If any of the above assessments are answered yes, consider ordering appropriate referrals (Optional):865241::\"click delete button to remove this line now\"}  Cognitive Screening: { :433741}    {Do you have sleep apnea, excessive snoring or daytime drowsiness? (Optional):403548}    {Outside tests to abstract? :820895}    Hyperlipidemia Follow-Up      Are you regularly taking any medication or supplement to lower your cholesterol?   { :572053}    Are you having muscle aches or other side effects that you think could be caused by your cholesterol lowering medication?  { :846619}      Reviewed and updated as needed this visit by clinical staff                 Reviewed and updated as needed this visit by Provider                Social History     Tobacco Use     Smoking status: Never Smoker     Smokeless tobacco: Never Used   Substance Use Topics     Alcohol use: No     Alcohol/week: 0.0 standard drinks                           Current providers sharing in care for this patient include: {Rooming staff:  Please update Care Team in Rooming Activity, refresh this note and then delete this statement}  Patient Care Team:  Robert Blakely MD as PCP - General  Robert Blakely MD as Assigned PCP    The following health maintenance items are reviewed in Epic and correct as of today:  Health Maintenance   Topic Date Due     ZOSTER IMMUNIZATION (2 of 3) 05/25/2015     STOOL GUIAC  09/28/2018     Pneumococcal Vaccine: 65+ Years (1 of 1 - PPSV23) 04/06/2019     ADVANCE CARE PLANNING  04/11/2019     MEDICARE ANNUAL WELLNESS VISIT  10/23/2019     DTAP/TDAP/TD IMMUNIZATION (9 - Td) 07/21/2020     INFLUENZA " "VACCINE (1) 2020     PENELOPE ASSESSMENT  2021     PHQ-9  2021     FALL RISK ASSESSMENT  2021     MAMMO SCREENING  10/14/2021     COLORECTAL CANCER SCREENING  2022     LIPID  10/23/2023     DEXA  Completed     HEPATITIS C SCREENING  Completed     PHQ-2  Completed     IPV IMMUNIZATION  Completed     Pneumococcal Vaccine: Pediatrics (0 to 5 Years) and At-Risk Patients (6 to 64 Years)  Aged Out     MENINGITIS IMMUNIZATION  Aged Out     HEPATITIS B IMMUNIZATION  Aged Out     {Chronicprobdata (Optional):468761}  {Decision Support (Optional):970894}    ROS:  {ROS COMP:821704}    OBJECTIVE:   There were no vitals taken for this visit. Estimated body mass index is 24.02 kg/m  as calculated from the following:    Height as of 20: 1.727 m (5' 8\").    Weight as of 20: 71.7 kg (158 lb).  EXAM:   {Exam :072774}    {Diagnostic Test Results (Optional):859280::\"Diagnostic Test Results:\",\"Labs reviewed in Epic\"}    ASSESSMENT / PLAN:   {Diag Picklist:107037}    Patient has been advised of split billing requirements and indicates understanding: {YES / NO:913704::\"Yes\"}    COUNSELING:  {Medicare Counselin}    Estimated body mass index is 24.02 kg/m  as calculated from the following:    Height as of 20: 1.727 m (5' 8\").    Weight as of 20: 71.7 kg (158 lb).    {Weight Management Plan (ACO) Complete if BMI is abnormal-  Ages 18-64  BMI >24.9.  Age 65+ with BMI <23 or >30 (Optional):300613}    She reports that she has never smoked. She has never used smokeless tobacco.    Appropriate preventive services were discussed with this patient, including applicable screening as appropriate for cardiovascular disease, diabetes, osteopenia/osteoporosis, and glaucoma.  As appropriate for age/gender, discussed screening for colorectal cancer, prostate cancer, breast cancer, and cervical cancer. Checklist reviewing preventive services available has been given to the patient.    Reviewed patients " plan of care and provided an AVS. The {CarePlan:060951} for Katelyn meets the Care Plan requirement. This Care Plan has been established and reviewed with the {PATIENT, FAMILY MEMBER, CAREGIVER:896813}.    Counseling Resources:  ATP IV Guidelines  Pooled Cohorts Equation Calculator  Breast Cancer Risk Calculator  BRCA-Related Cancer Risk Assessment: FHS-7 Tool  FRAX Risk Assessment  ICSI Preventive Guidelines  Dietary Guidelines for Americans, 2010  USDA's MyPlate  ASA Prophylaxis  Lung CA Screening    Robert Blakely MD  [facility]

## 2020-10-19 NOTE — PATIENT INSTRUCTIONS
Preventive Health Recommendations    See your health care provider every year to    Review health changes.     Discuss preventive care.      Review your medicines if your doctor has prescribed any.      You no longer need a yearly Pap test unless you've had an abnormal Pap test in the past 10 years. If you have vaginal symptoms, such as bleeding or discharge, be sure to talk with your provider about a Pap test.      Every 1 to 2 years, have a mammogram.  If you are over 69, talk with your health care provider about whether or not you want to continue having screening mammograms.      Every 10 years, have a colonoscopy. Or, have a yearly FIT test (stool test). These exams will check for colon cancer.       Have a cholesterol test every 5 years, or more often if your doctor advises it.       Have a diabetes test (fasting glucose) every three years. If you are at risk for diabetes, you should have this test more often.       At age 65, have a bone density scan (DEXA) to check for osteoporosis (brittle bone disease).    Shots:    Get a flu shot each year.    Get a tetanus shot every 10 years.    Talk to your doctor about your pneumonia vaccines. There are now two you should receive - Pneumovax (PPSV 23) and Prevnar (PCV 13).    Talk to your pharmacist about the shingles vaccine.    Talk to your doctor about the hepatitis B vaccine.    Nutrition:     Eat at least 5 servings of fruits and vegetables each day.      Eat whole-grain bread, whole-wheat pasta and brown rice instead of white grains and rice.      Get adequate about Calcium and Vitamin D.     Lifestyle    Exercise at least 150 minutes a week (30 minutes a day, 5 days a week). This will help you control your weight and prevent disease.      Limit alcohol to one drink per day.      No smoking.       Wear sunscreen to prevent skin cancer.       See your dentist twice a year for an exam and cleaning.      See your eye doctor every 1 to 2 years to screen for  conditions such as glaucoma, macular degeneration, cataracts, etc.    Personalized Prevention Plan  You are due for the preventive services outlined below.  Your care team is available to assist you in scheduling these services.  If you have already completed any of these items, please share that information with your care team to update in your medical record.    Health Maintenance Due   Topic Date Due     Zoster (Shingles) Vaccine (2 of 3) 05/25/2015     Stool guaic - yearly  09/28/2018     Pneumococcal Vaccine (1 of 1 - PPSV23) 04/06/2019     Discuss Advance Care Planning  04/11/2019     Annual Wellness Visit  10/23/2019     Diptheria Tetanus Pertussis (DTAP/TDAP/TD) Vaccine (9 - Td) 07/21/2020     Flu Vaccine (1) 09/01/2020

## 2020-10-25 ASSESSMENT — ACTIVITIES OF DAILY LIVING (ADL): CURRENT_FUNCTION: NO ASSISTANCE NEEDED

## 2020-10-26 ENCOUNTER — OFFICE VISIT (OUTPATIENT)
Dept: FAMILY MEDICINE | Facility: OTHER | Age: 65
End: 2020-10-26
Attending: FAMILY MEDICINE
Payer: COMMERCIAL

## 2020-10-26 VITALS
TEMPERATURE: 97.5 F | BODY MASS INDEX: 29.44 KG/M2 | HEART RATE: 72 BPM | DIASTOLIC BLOOD PRESSURE: 60 MMHG | WEIGHT: 160 LBS | SYSTOLIC BLOOD PRESSURE: 110 MMHG | OXYGEN SATURATION: 98 % | HEIGHT: 62 IN

## 2020-10-26 DIAGNOSIS — Z23 NEED FOR PROPHYLACTIC VACCINATION AND INOCULATION AGAINST INFLUENZA: ICD-10-CM

## 2020-10-26 DIAGNOSIS — E78.5 DYSLIPIDEMIA: ICD-10-CM

## 2020-10-26 DIAGNOSIS — F43.23 ADJUSTMENT DISORDER WITH MIXED ANXIETY AND DEPRESSED MOOD: ICD-10-CM

## 2020-10-26 DIAGNOSIS — R73.03 PRE-DIABETES: ICD-10-CM

## 2020-10-26 DIAGNOSIS — Z00.00 ROUTINE GENERAL MEDICAL EXAMINATION AT A HEALTH CARE FACILITY: Primary | ICD-10-CM

## 2020-10-26 PROCEDURE — 90662 IIV NO PRSV INCREASED AG IM: CPT

## 2020-10-26 PROCEDURE — 99397 PER PM REEVAL EST PAT 65+ YR: CPT | Performed by: FAMILY MEDICINE

## 2020-10-26 PROCEDURE — G0463 HOSPITAL OUTPT CLINIC VISIT: HCPCS | Mod: 25

## 2020-10-26 PROCEDURE — 90670 PCV13 VACCINE IM: CPT

## 2020-10-26 ASSESSMENT — ANXIETY QUESTIONNAIRES
1. FEELING NERVOUS, ANXIOUS, OR ON EDGE: NOT AT ALL
GAD7 TOTAL SCORE: 0
6. BECOMING EASILY ANNOYED OR IRRITABLE: NOT AT ALL
7. FEELING AFRAID AS IF SOMETHING AWFUL MIGHT HAPPEN: NOT AT ALL
3. WORRYING TOO MUCH ABOUT DIFFERENT THINGS: NOT AT ALL
5. BEING SO RESTLESS THAT IT IS HARD TO SIT STILL: NOT AT ALL
4. TROUBLE RELAXING: NOT AT ALL
2. NOT BEING ABLE TO STOP OR CONTROL WORRYING: NOT AT ALL

## 2020-10-26 ASSESSMENT — PATIENT HEALTH QUESTIONNAIRE - PHQ9: SUM OF ALL RESPONSES TO PHQ QUESTIONS 1-9: 0

## 2020-10-26 ASSESSMENT — MIFFLIN-ST. JEOR: SCORE: 1222.26

## 2020-10-26 ASSESSMENT — ACTIVITIES OF DAILY LIVING (ADL): CURRENT_FUNCTION: NO ASSISTANCE NEEDED

## 2020-10-26 ASSESSMENT — PAIN SCALES - GENERAL: PAINLEVEL: NO PAIN (0)

## 2020-10-26 NOTE — NURSING NOTE
"Chief Complaint   Patient presents with     Imm/Inj     Flu Shot     Physical       Initial /60   Pulse 72   Temp 97.5  F (36.4  C)   Ht 1.58 m (5' 2.21\")   Wt 72.6 kg (160 lb)   SpO2 98%   BMI 29.07 kg/m   Estimated body mass index is 29.07 kg/m  as calculated from the following:    Height as of this encounter: 1.58 m (5' 2.21\").    Weight as of this encounter: 72.6 kg (160 lb).  Medication Reconciliation: complete  Klaus Gillette LPN  "

## 2020-10-26 NOTE — PROGRESS NOTES
"SUBJECTIVE:   Katelyn Manley is a 65 year old female who presents for Preventive Visit.      Patient has been advised of split billing requirements and indicates understanding: Yes   Are you in the first 12 months of your Medicare coverage?  No    Imm/Inj    Healthy Habits:     In general, how would you rate your overall health?  Good    Frequency of exercise:  None    Do you usually eat at least 4 servings of fruit and vegetables a day, include whole grains    & fiber and avoid regularly eating high fat or \"junk\" foods?  No    Taking medications regularly:  Yes    Medication side effects:  None    Ability to successfully perform activities of daily living:  No assistance needed    Home Safety:  No safety concerns identified    Hearing Impairment:  No hearing concerns    In the past 6 months, have you been bothered by leaking of urine?  No    In general, how would you rate your overall mental or emotional health?  Good      PHQ-2 Total Score: 0    Additional concerns today:  No    Do you feel safe in your environment? Yes    Have you ever done Advance Care Planning? (For example, a Health Directive, POLST, or a discussion with a medical provider or your loved ones about your wishes): Yes, patient states has an Advance Care Planning document and will bring a copy to the clinic.      Fall risk  Fallen 2 or more times in the past year?: No  Any fall with injury in the past year?: No    Cognitive Screening   1) Repeat 3 items (Leader, Season, Table)    2) Clock draw: NORMAL  3) 3 item recall: Recalls 3 objects  Results: 3 items recalled: COGNITIVE IMPAIRMENT LESS LIKELY    Mini-CogTM Copyright CARMEN Anderson. Licensed by the author for use in [facility]; reprinted with permission ([email or web address]). All rights reserved.      Do you have sleep apnea, excessive snoring or daytime drowsiness?: no    Reviewed and updated as needed this visit by clinical staff  Tobacco  Allergies  Meds   Med Hx  Surg Hx  Fam Hx  Soc " Hx        Reviewed and updated as needed this visit by Provider                Social History     Tobacco Use     Smoking status: Never Smoker     Smokeless tobacco: Never Used   Substance Use Topics     Alcohol use: No     Alcohol/week: 0.0 standard drinks         Alcohol Use 10/25/2020   Prescreen: >3 drinks/day or >7 drinks/week? No           Depression Followup    How are you doing with your depression since your last visit? No change    Are you having other symptoms that might be associated with depression? No    Have you had a significant life event?  No     Are you feeling anxious or having panic attacks?   No    Do you have any concerns with your use of alcohol or other drugs? No    Social History     Tobacco Use     Smoking status: Never Smoker     Smokeless tobacco: Never Used   Substance Use Topics     Alcohol use: No     Alcohol/week: 0.0 standard drinks     Drug use: No     PHQ 10/28/2019 9/25/2020 10/26/2020   PHQ-9 Total Score 0 0 0   Q9: Thoughts of better off dead/self-harm past 2 weeks Not at all Not at all Not at all     PENELOPE-7 SCORE 10/28/2019 9/25/2020 10/26/2020   Total Score 0 0 0     Last PHQ-9 10/26/2020   1.  Little interest or pleasure in doing things 0   2.  Feeling down, depressed, or hopeless 0   3.  Trouble falling or staying asleep, or sleeping too much 0   4.  Feeling tired or having little energy 0   5.  Poor appetite or overeating 0   6.  Feeling bad about yourself 0   7.  Trouble concentrating 0   8.  Moving slowly or restless 0   Q9: Thoughts of better off dead/self-harm past 2 weeks 0   PHQ-9 Total Score 0   Difficulty at work, home, or with people -     PENELOPE-7  10/26/2020   1. Feeling nervous, anxious, or on edge 0   2. Not being able to stop or control worrying 0   3. Worrying too much about different things 0   4. Trouble relaxing 0   5. Being so restless that it is hard to sit still 0   6. Becoming easily annoyed or irritable 0   7. Feeling afraid, as if something awful might  "happen 0   PENELOPE-7 Total Score 0   If you checked any problems, how difficult have they made it for you to do your work, take care of things at home, or get along with other people? -         Suicide Assessment Five-step Evaluation and Treatment (SAFE-T)      Current providers sharing in care for this patient include:   Patient Care Team:  Robert Blakely MD as PCP - General  Robert Blakely MD as Assigned PCP    The following health maintenance items are reviewed in Epic and correct as of today:  Health Maintenance   Topic Date Due     ZOSTER IMMUNIZATION (2 of 3) 05/25/2015     STOOL GUIAC  09/28/2018     Pneumococcal Vaccine: 65+ Years (1 of 1 - PPSV23) 04/06/2019     ADVANCE CARE PLANNING  04/11/2019     MEDICARE ANNUAL WELLNESS VISIT  10/23/2019     DTAP/TDAP/TD IMMUNIZATION (9 - Td) 07/21/2020     INFLUENZA VACCINE (1) 09/01/2020     PENELOPE ASSESSMENT  03/25/2021     PHQ-9  03/25/2021     FALL RISK ASSESSMENT  09/25/2021     MAMMO SCREENING  10/14/2021     COLORECTAL CANCER SCREENING  02/03/2022     LIPID  10/23/2023     DEXA  Completed     HEPATITIS C SCREENING  Completed     PHQ-2  Completed     IPV IMMUNIZATION  Completed     Pneumococcal Vaccine: Pediatrics (0 to 5 Years) and At-Risk Patients (6 to 64 Years)  Aged Out     MENINGITIS IMMUNIZATION  Aged Out     HEPATITIS B IMMUNIZATION  Aged Out     Labs reviewed in EPIC      Review of Systems  Constitutional, HEENT, cardiovascular, pulmonary, GI, , musculoskeletal, neuro, skin, endocrine and psych systems are negative, except as otherwise noted.    OBJECTIVE:   /60   Pulse 72   Temp 97.5  F (36.4  C)   Ht 1.58 m (5' 2.21\")   Wt 72.6 kg (160 lb)   SpO2 98%   BMI 29.07 kg/m   Estimated body mass index is 29.07 kg/m  as calculated from the following:    Height as of this encounter: 1.58 m (5' 2.21\").    Weight as of this encounter: 72.6 kg (160 lb).  Physical Exam  GENERAL: healthy, alert and no distress  EYES: Eyes grossly normal to inspection, " PERRL and conjunctivae and sclerae normal  HENT: ear canals and TM's normal, nose and mouth without ulcers or lesions  NECK: no adenopathy, no asymmetry, masses, or scars and thyroid normal to palpation  RESP: lungs clear to auscultation - no rales, rhonchi or wheezes  BREAST: normal without masses, tenderness or nipple discharge and no palpable axillary masses or adenopathy  CV: regular rate and rhythm, normal S1 S2, no S3 or S4, no murmur, click or rub, no peripheral edema and peripheral pulses strong  ABDOMEN: soft, nontender, no hepatosplenomegaly, no masses and bowel sounds normal  MS: no gross musculoskeletal defects noted, no edema  SKIN: no suspicious lesions or rashes  NEURO: Normal strength and tone, mentation intact and speech normal  PSYCH: mentation appears normal, affect normal/bright  LYMPH: no cervical, supraclavicular, axillary, or inguinal adenopathy        ASSESSMENT / PLAN:   1. Routine general medical examination at a health care facility  Overall doing well. Very healthy lady with good health habits . Mammogram is set for November   - CBC with platelets differential; Future  - Comprehensive metabolic panel; Future  - Lipid Profile; Future    2. Need for prophylactic vaccination and inoculation against influenza  Shots given. Will get Shingrix in future   - FLUZONE HIGH DOSE 65+  [66379]  - Pneumococcal vaccine 13 valent PCV13 IM (Prevnar) [80801]  - ADMIN INFLUENZA (For MEDICARE Patients ONLY) []  - ADMIN PNEUMOVAX VACCINE (For MEDICARE Patients ONLY) []  - FLUZONE HIGH DOSE 65+  [23439]  - Vaccine Administration, Initial [63916]  - Pneumococcal vaccine 13 valent PCV13 IM (Prevnar) [[id number]]  - ADMIN MEDICARE: Pneumococcal Vaccine ()    3. Dyslipidemia  Mild in past. Will recheck in near future fasting   - Comprehensive metabolic panel; Future  - Lipid Profile; Future    4. Pre-diabetes  Will check fasting labs in future   - Comprehensive metabolic panel; Future    Adjustment  "disorder - stable Continue current medications and behavioral changes. Needs long term selective serotonin reuptake inhibitor    Patient has been advised of split billing requirements and indicates understanding:   COUNSELING:  Reviewed preventive health counseling, as reflected in patient instructions       Regular exercise       Healthy diet/nutrition    Estimated body mass index is 29.07 kg/m  as calculated from the following:    Height as of this encounter: 1.58 m (5' 2.21\").    Weight as of this encounter: 72.6 kg (160 lb).        She reports that she has never smoked. She has never used smokeless tobacco.      Appropriate preventive services were discussed with this patient, including applicable screening as appropriate for cardiovascular disease, diabetes, osteopenia/osteoporosis, and glaucoma.  As appropriate for age/gender, discussed screening for colorectal cancer, prostate cancer, breast cancer, and cervical cancer. Checklist reviewing preventive services available has been given to the patient.    Reviewed patients plan of care and provided an AVS. The Basic Care Plan (routine screening as documented in Health Maintenance) for Katelyn meets the Care Plan requirement. This Care Plan has been established and reviewed with the Patient.    Counseling Resources:  ATP IV Guidelines  Pooled Cohorts Equation Calculator  Breast Cancer Risk Calculator  Breast Cancer: Medication to Reduce Risk  FRAX Risk Assessment  ICSI Preventive Guidelines  Dietary Guidelines for Americans, 2010  [employer]'s MyPlate  ASA Prophylaxis  Lung CA Screening    Robert Blakely MD  [facility] [place]    Identified Health Risks:  "

## 2020-10-27 ASSESSMENT — ANXIETY QUESTIONNAIRES: GAD7 TOTAL SCORE: 0

## 2020-11-09 ENCOUNTER — ANCILLARY PROCEDURE (OUTPATIENT)
Dept: MAMMOGRAPHY | Facility: OTHER | Age: 65
End: 2020-11-09
Attending: FAMILY MEDICINE
Payer: COMMERCIAL

## 2020-11-09 DIAGNOSIS — Z12.31 VISIT FOR SCREENING MAMMOGRAM: ICD-10-CM

## 2020-11-09 DIAGNOSIS — Z00.00 ROUTINE GENERAL MEDICAL EXAMINATION AT A HEALTH CARE FACILITY: ICD-10-CM

## 2020-11-09 DIAGNOSIS — R73.03 PRE-DIABETES: ICD-10-CM

## 2020-11-09 DIAGNOSIS — E78.5 DYSLIPIDEMIA: ICD-10-CM

## 2020-11-09 LAB
ALBUMIN SERPL-MCNC: 3.7 G/DL (ref 3.4–5)
ALP SERPL-CCNC: 89 U/L (ref 40–150)
ALT SERPL W P-5'-P-CCNC: 25 U/L (ref 0–50)
ANION GAP SERPL CALCULATED.3IONS-SCNC: 5 MMOL/L (ref 3–14)
AST SERPL W P-5'-P-CCNC: 23 U/L (ref 0–45)
BASOPHILS # BLD AUTO: 0.1 10E9/L (ref 0–0.2)
BASOPHILS NFR BLD AUTO: 0.8 %
BILIRUB SERPL-MCNC: 0.6 MG/DL (ref 0.2–1.3)
BUN SERPL-MCNC: 19 MG/DL (ref 7–30)
CALCIUM SERPL-MCNC: 9 MG/DL (ref 8.5–10.1)
CHLORIDE SERPL-SCNC: 105 MMOL/L (ref 94–109)
CHOLEST SERPL-MCNC: 221 MG/DL
CO2 SERPL-SCNC: 26 MMOL/L (ref 20–32)
CREAT SERPL-MCNC: 1.1 MG/DL (ref 0.52–1.04)
DIFFERENTIAL METHOD BLD: NORMAL
EOSINOPHIL # BLD AUTO: 0.5 10E9/L (ref 0–0.7)
EOSINOPHIL NFR BLD AUTO: 5.5 %
ERYTHROCYTE [DISTWIDTH] IN BLOOD BY AUTOMATED COUNT: 13.4 % (ref 10–15)
GFR SERPL CREATININE-BSD FRML MDRD: 52 ML/MIN/{1.73_M2}
GLUCOSE SERPL-MCNC: 102 MG/DL (ref 70–99)
HCT VFR BLD AUTO: 40.2 % (ref 35–47)
HDLC SERPL-MCNC: 53 MG/DL
HGB BLD-MCNC: 13.4 G/DL (ref 11.7–15.7)
IMM GRANULOCYTES # BLD: 0 10E9/L (ref 0–0.4)
IMM GRANULOCYTES NFR BLD: 0.3 %
LDLC SERPL CALC-MCNC: 131 MG/DL
LYMPHOCYTES # BLD AUTO: 3.6 10E9/L (ref 0.8–5.3)
LYMPHOCYTES NFR BLD AUTO: 40.8 %
MCH RBC QN AUTO: 28.5 PG (ref 26.5–33)
MCHC RBC AUTO-ENTMCNC: 33.3 G/DL (ref 31.5–36.5)
MCV RBC AUTO: 86 FL (ref 78–100)
MONOCYTES # BLD AUTO: 0.6 10E9/L (ref 0–1.3)
MONOCYTES NFR BLD AUTO: 7.2 %
NEUTROPHILS # BLD AUTO: 4 10E9/L (ref 1.6–8.3)
NEUTROPHILS NFR BLD AUTO: 45.4 %
NONHDLC SERPL-MCNC: 168 MG/DL
NRBC # BLD AUTO: 0 10*3/UL
NRBC BLD AUTO-RTO: 0 /100
PLATELET # BLD AUTO: 285 10E9/L (ref 150–450)
POTASSIUM SERPL-SCNC: 4.1 MMOL/L (ref 3.4–5.3)
PROT SERPL-MCNC: 8.4 G/DL (ref 6.8–8.8)
RBC # BLD AUTO: 4.7 10E12/L (ref 3.8–5.2)
SODIUM SERPL-SCNC: 136 MMOL/L (ref 133–144)
TRIGL SERPL-MCNC: 185 MG/DL
WBC # BLD AUTO: 8.7 10E9/L (ref 4–11)

## 2020-11-09 PROCEDURE — 80053 COMPREHEN METABOLIC PANEL: CPT | Mod: ZL | Performed by: FAMILY MEDICINE

## 2020-11-09 PROCEDURE — 36415 COLL VENOUS BLD VENIPUNCTURE: CPT | Mod: ZL | Performed by: FAMILY MEDICINE

## 2020-11-09 PROCEDURE — 77063 BREAST TOMOSYNTHESIS BI: CPT | Mod: TC

## 2020-11-09 PROCEDURE — 85025 COMPLETE CBC W/AUTO DIFF WBC: CPT | Mod: ZL | Performed by: FAMILY MEDICINE

## 2020-11-09 PROCEDURE — 80061 LIPID PANEL: CPT | Mod: ZL | Performed by: FAMILY MEDICINE

## 2020-11-10 ENCOUNTER — OFFICE VISIT (OUTPATIENT)
Dept: CHIROPRACTIC MEDICINE | Facility: OTHER | Age: 65
End: 2020-11-10
Attending: CHIROPRACTOR
Payer: COMMERCIAL

## 2020-11-10 DIAGNOSIS — M54.50 ACUTE BILATERAL LOW BACK PAIN WITHOUT SCIATICA: ICD-10-CM

## 2020-11-10 DIAGNOSIS — M99.03 SEGMENTAL AND SOMATIC DYSFUNCTION OF LUMBAR REGION: Primary | ICD-10-CM

## 2020-11-10 DIAGNOSIS — M99.01 SEGMENTAL AND SOMATIC DYSFUNCTION OF CERVICAL REGION: ICD-10-CM

## 2020-11-10 DIAGNOSIS — M99.02 SEGMENTAL AND SOMATIC DYSFUNCTION OF THORACIC REGION: ICD-10-CM

## 2020-11-10 PROCEDURE — 98941 CHIROPRACT MANJ 3-4 REGIONS: CPT | Mod: AT | Performed by: CHIROPRACTOR

## 2020-11-12 NOTE — PROGRESS NOTES
Subjective Finding:    Chief compalint: Patient presents with:  Back Pain  , Pain Scale: 5/10, Intensity: sharp, Duration: 1 weeks, Change since last visit: , Radiating: no.    Date of injury:     Activities that the pain restricts:   Home/household activities: no.  Work duties: no.  Hobbies/social: no.  Sleep: no.  Makes symptoms better: rest.  Makes symptoms worse: activity, cervical extension and cervical flexion.  Have you seen anyone else for the symptoms? No.  Work related: no.  Automobile related injury: no.    Objective and Assessment:    Posture Analysis:   High shoulder: right.  Head tilt: .  High iliac crest: .  Head carriage: forward.  Thoracic Kyphosis: neutral.  Lumbar Lordosis: neutral.    Lumbar Range of Motion: flexion decreased.  Cervical Range of Motion: flexion decreased.  Thoracic Range of Motion: .  Extremity Range of Motion: .    Palpation:   C paraspine tightness with restricted ROM      Segmental dysfunction pre-treatment: T5   C7    L45    Assessment post-treatment:  Cervical: ROM increased and pain and tenderness decreased.  Thoracic: ROM increased.  Lumbar: ROM increased.    Comments: .      Complicating Factors: .    Plan / Procedure:    Expected release date: .  Treatment plan: PRN.  Instructed patient: ice 20 minutes every other hour as needed.  Short term goals: reduce pain.  Long term goals: restore normal function.  Prognosis: excellent.

## 2020-11-13 ENCOUNTER — OFFICE VISIT (OUTPATIENT)
Dept: CHIROPRACTIC MEDICINE | Facility: OTHER | Age: 65
End: 2020-11-13
Attending: CHIROPRACTOR
Payer: COMMERCIAL

## 2020-11-13 DIAGNOSIS — M99.02 SEGMENTAL AND SOMATIC DYSFUNCTION OF THORACIC REGION: ICD-10-CM

## 2020-11-13 DIAGNOSIS — M54.50 ACUTE BILATERAL LOW BACK PAIN WITHOUT SCIATICA: ICD-10-CM

## 2020-11-13 DIAGNOSIS — M99.03 SEGMENTAL AND SOMATIC DYSFUNCTION OF LUMBAR REGION: Primary | ICD-10-CM

## 2020-11-13 DIAGNOSIS — M99.01 SEGMENTAL AND SOMATIC DYSFUNCTION OF CERVICAL REGION: ICD-10-CM

## 2020-11-13 PROCEDURE — 98941 CHIROPRACT MANJ 3-4 REGIONS: CPT | Mod: AT | Performed by: CHIROPRACTOR

## 2020-11-16 NOTE — PROGRESS NOTES
Subjective Finding:    Chief compalint: Patient presents with:  Back Pain  Neck Pain  , Pain Scale: 5/10, Intensity: sharp, Duration: 1 weeks, Change since last visit: , Radiating: no.    Date of injury:     Activities that the pain restricts:   Home/household activities: no.  Work duties: no.  Hobbies/social: no.  Sleep: no.  Makes symptoms better: rest.  Makes symptoms worse: activity, cervical extension and cervical flexion.  Have you seen anyone else for the symptoms? No.  Work related: no.  Automobile related injury: no.    Objective and Assessment:    Posture Analysis:   High shoulder: right.  Head tilt: .  High iliac crest: .  Head carriage: forward.  Thoracic Kyphosis: neutral.  Lumbar Lordosis: neutral.    Lumbar Range of Motion: flexion decreased.  Cervical Range of Motion: flexion decreased.  Thoracic Range of Motion: .  Extremity Range of Motion: .    Palpation:   C paraspine tightness with restricted ROM      Segmental dysfunction pre-treatment: T5   C7    L45    Assessment post-treatment:  Cervical: ROM increased and pain and tenderness decreased.  Thoracic: ROM increased.  Lumbar: ROM increased.    Comments: .      Complicating Factors: .    Plan / Procedure:    Expected release date: .  Treatment plan: PRN.  Instructed patient: ice 20 minutes every other hour as needed.  Short term goals: reduce pain.  Long term goals: restore normal function.  Prognosis: excellent.

## 2021-02-22 ENCOUNTER — TELEPHONE (OUTPATIENT)
Dept: FAMILY MEDICINE | Facility: OTHER | Age: 66
End: 2021-02-22

## 2021-02-22 NOTE — TELEPHONE ENCOUNTER
FYI Only:    Call from patient stating she is in [place] currently. Calling to provide Dr. Blakely with an update that she is currently experiencing a mild case of diverticulitis, has  started on the antibiotic prescribed by Dr. Blakely and she is doing good.     Not requesting a return call. Just an update.

## 2021-03-31 ENCOUNTER — TELEPHONE (OUTPATIENT)
Dept: FAMILY MEDICINE | Facility: OTHER | Age: 66
End: 2021-03-31

## 2021-03-31 NOTE — TELEPHONE ENCOUNTER
Pt called, reports history of diverticulitis. States that she has a prescription for augmentin and she was instructed by PCP to start med if she experienced a recurrence of symptoms. Pt notes that her symptoms started on Monday and she did start taking the augmentin today. Pt was told to inform PCP when she started med.

## 2021-05-05 ENCOUNTER — OFFICE VISIT (OUTPATIENT)
Dept: FAMILY MEDICINE | Facility: OTHER | Age: 66
End: 2021-05-05
Attending: FAMILY MEDICINE
Payer: COMMERCIAL

## 2021-05-05 VITALS
BODY MASS INDEX: 29.44 KG/M2 | OXYGEN SATURATION: 98 % | WEIGHT: 162 LBS | TEMPERATURE: 97.5 F | RESPIRATION RATE: 18 BRPM | SYSTOLIC BLOOD PRESSURE: 110 MMHG | HEART RATE: 82 BPM | DIASTOLIC BLOOD PRESSURE: 84 MMHG

## 2021-05-05 DIAGNOSIS — M75.22 BICEPS TENDONITIS, LEFT: Primary | ICD-10-CM

## 2021-05-05 PROCEDURE — 99213 OFFICE O/P EST LOW 20 MIN: CPT | Performed by: FAMILY MEDICINE

## 2021-05-05 PROCEDURE — G0463 HOSPITAL OUTPT CLINIC VISIT: HCPCS

## 2021-05-05 RX ORDER — PREDNISONE 20 MG/1
TABLET ORAL
Qty: 10 TABLET | Refills: 0 | Status: SHIPPED | OUTPATIENT
Start: 2021-05-05 | End: 2021-09-16

## 2021-05-05 ASSESSMENT — ANXIETY QUESTIONNAIRES
GAD7 TOTAL SCORE: 0
6. BECOMING EASILY ANNOYED OR IRRITABLE: NOT AT ALL
3. WORRYING TOO MUCH ABOUT DIFFERENT THINGS: NOT AT ALL
2. NOT BEING ABLE TO STOP OR CONTROL WORRYING: NOT AT ALL
5. BEING SO RESTLESS THAT IT IS HARD TO SIT STILL: NOT AT ALL
4. TROUBLE RELAXING: NOT AT ALL
7. FEELING AFRAID AS IF SOMETHING AWFUL MIGHT HAPPEN: NOT AT ALL
1. FEELING NERVOUS, ANXIOUS, OR ON EDGE: NOT AT ALL

## 2021-05-05 ASSESSMENT — PAIN SCALES - GENERAL: PAINLEVEL: SEVERE PAIN (7)

## 2021-05-05 ASSESSMENT — PATIENT HEALTH QUESTIONNAIRE - PHQ9: SUM OF ALL RESPONSES TO PHQ QUESTIONS 1-9: 2

## 2021-05-05 NOTE — NURSING NOTE
"Chief Complaint   Patient presents with     Musculoskeletal Problem       Initial /84   Pulse 82   Temp 97.5  F (36.4  C)   Resp 18   Wt 73.5 kg (162 lb)   SpO2 98%   BMI 29.44 kg/m   Estimated body mass index is 29.44 kg/m  as calculated from the following:    Height as of 10/26/20: 1.58 m (5' 2.21\").    Weight as of this encounter: 73.5 kg (162 lb).  Medication Reconciliation: miley Solis  "

## 2021-05-05 NOTE — PROGRESS NOTES
"    Assessment & Plan     Biceps tendonitis, left  R/B of steroids discussed including tendon rupture. Pt is really hurting and agreed on steroids. Pt is aware Tendonitis can cause rupture. Sling discussed - she has one and lots of ice. Rest over weekend then slowly advance activity . Symptomatic treatment was discussed along when patient should call and/or come back into the clinic or go to ER/Urgent care. All questions answered.   - predniSONE (DELTASONE) 20 MG tablet; 2 tablets today at supper  Then 1 tablet orally twice daily  for 4 more days             BMI:   Estimated body mass index is 29.44 kg/m  as calculated from the following:    Height as of 10/26/20: 1.58 m (5' 2.21\").    Weight as of this encounter: 73.5 kg (162 lb).           No follow-ups on file.    Robert Blakely MD  [facility] - MICHELA Zepeda is a 66 year old who presents for the following health issues     HPI     Musculoskeletal problem/pain  Onset/Duration: Tuesday   Description  Location: Shoulder - left  Joint Swelling: no  Redness: no  Pain: YES- 8/10  Warmth: no  Intensity:  severe  Progression of Symptoms:  worsening  Accompanying signs and symptoms:   Fevers: no  Numbness/tingling/weakness: YES- Fingers, third ,  fourth  and fifth fingers on left hand  History  Trauma to the area: no  Recent illness:  no  Previous similar problem: no  Previous evaluation:  no  Precipitating or alleviating factors:  Aggravating factors include: Rotation   Therapies tried and outcome: ice and Ibuprofen - ineffective     NO injury NO trauma   NO overuse  Right handed  Hard time moving shoulder - has to use other hand     Not getting better and terrible pain to even making her nauseous          Review of Systems   CONSTITUTIONAL: NEGATIVE for fever, chills, change in weight  RESP: NEGATIVE for significant cough or SOB  CV: NEGATIVE for chest pain, palpitations or peripheral edema      Objective    /84   Pulse 82   Temp " 97.5  F (36.4  C)   Resp 18   Wt 73.5 kg (162 lb)   SpO2 98%   BMI 29.44 kg/m    Body mass index is 29.44 kg/m .  Physical Exam   GENERAL: healthy, alert and no distress  MS: left shoulder - decrease ROM but pt points to bicep tendon insertion. She is very tender there and stressing the bicep causes pain. She may have shoulder bursitis /tendonitis but seems more bicep related.  no gross musculoskeletal defects noted, no edema

## 2021-05-06 ASSESSMENT — ANXIETY QUESTIONNAIRES: GAD7 TOTAL SCORE: 0

## 2021-05-23 DIAGNOSIS — F43.23 ADJUSTMENT DISORDER WITH MIXED ANXIETY AND DEPRESSED MOOD: ICD-10-CM

## 2021-05-24 RX ORDER — CITALOPRAM HYDROBROMIDE 10 MG/1
TABLET ORAL
Qty: 90 TABLET | Refills: 3 | Status: SHIPPED | OUTPATIENT
Start: 2021-05-24 | End: 2021-05-25

## 2021-05-24 NOTE — TELEPHONE ENCOUNTER
celexa      Last Written Prescription Date:  6/1/2020  Last Fill Quantity: 90,   # refills: 3  Last Office Visit: 5/5/21  Future Office visit:

## 2021-09-03 DIAGNOSIS — K57.32 DIVERTICULITIS OF COLON: ICD-10-CM

## 2021-09-03 NOTE — TELEPHONE ENCOUNTER
AMOX-CLAV 875-125 MG TABLET    Call from patient requesting a refill for antibiotic listed above.     Patient reporting a flare up of diverticulitis of the colon, has run out of refills previously prescribed by Dr. Blakely.     Please advise.     Patient can be reached at [phone]

## 2021-09-15 ENCOUNTER — TELEPHONE (OUTPATIENT)
Dept: FAMILY MEDICINE | Facility: OTHER | Age: 66
End: 2021-09-15

## 2021-09-15 NOTE — TELEPHONE ENCOUNTER
1:52 PM    Reason for Call: OVERBOOK    Patient is having the following symptoms: UTI  for 2 days.    The patient is requesting an appointment for ASAP with Zora.    Was an appointment offered for this call? Yes  If yes : Appointment type              Date 09/16/2021 @ 10:00AM (pt unavailable)     Preferred method for responding to this message: Telephone Call  What is your phone number ? [phone]    If we cannot reach you directly, may we leave a detailed response at the number you provided? Yes    Can this message wait until your PCP/provider returns, if unavailable today? Not applicable, Provider is in today    Deann Mancia

## 2021-09-16 ENCOUNTER — LAB (OUTPATIENT)
Dept: LAB | Facility: OTHER | Age: 66
End: 2021-09-16
Payer: COMMERCIAL

## 2021-09-16 ENCOUNTER — OFFICE VISIT (OUTPATIENT)
Dept: FAMILY MEDICINE | Facility: OTHER | Age: 66
End: 2021-09-16
Attending: FAMILY MEDICINE
Payer: COMMERCIAL

## 2021-09-16 VITALS
HEART RATE: 78 BPM | DIASTOLIC BLOOD PRESSURE: 68 MMHG | BODY MASS INDEX: 29.44 KG/M2 | WEIGHT: 160 LBS | OXYGEN SATURATION: 97 % | TEMPERATURE: 97.6 F | SYSTOLIC BLOOD PRESSURE: 118 MMHG | HEIGHT: 62 IN

## 2021-09-16 DIAGNOSIS — R30.0 DYSURIA: Primary | ICD-10-CM

## 2021-09-16 DIAGNOSIS — B37.31 YEAST INFECTION OF THE VAGINA: ICD-10-CM

## 2021-09-16 DIAGNOSIS — R30.0 DYSURIA: ICD-10-CM

## 2021-09-16 DIAGNOSIS — N30.01 ACUTE CYSTITIS WITH HEMATURIA: ICD-10-CM

## 2021-09-16 LAB
ALBUMIN UR-MCNC: 50 MG/DL
APPEARANCE UR: ABNORMAL
BACTERIA #/AREA URNS HPF: ABNORMAL /HPF
BILIRUB UR QL STRIP: NEGATIVE
COLOR UR AUTO: ABNORMAL
GLUCOSE UR STRIP-MCNC: NEGATIVE MG/DL
HGB UR QL STRIP: ABNORMAL
KETONES UR STRIP-MCNC: NEGATIVE MG/DL
LEUKOCYTE ESTERASE UR QL STRIP: ABNORMAL
MUCOUS THREADS #/AREA URNS LPF: PRESENT /LPF
NITRATE UR QL: NEGATIVE
PH UR STRIP: 5.5 [PH] (ref 4.7–8)
RBC URINE: 93 /HPF
SP GR UR STRIP: 1.01 (ref 1–1.03)
SQUAMOUS EPITHELIAL: 3 /HPF
TRANSITIONAL EPI: <1 /HPF
UROBILINOGEN UR STRIP-MCNC: NORMAL MG/DL
WBC CLUMPS #/AREA URNS HPF: PRESENT /HPF
WBC URINE: >182 /HPF

## 2021-09-16 PROCEDURE — 87086 URINE CULTURE/COLONY COUNT: CPT | Mod: ZL

## 2021-09-16 PROCEDURE — 81001 URINALYSIS AUTO W/SCOPE: CPT | Mod: ZL

## 2021-09-16 PROCEDURE — 99213 OFFICE O/P EST LOW 20 MIN: CPT | Performed by: FAMILY MEDICINE

## 2021-09-16 PROCEDURE — G0463 HOSPITAL OUTPT CLINIC VISIT: HCPCS

## 2021-09-16 RX ORDER — SULFAMETHOXAZOLE/TRIMETHOPRIM 800-160 MG
1 TABLET ORAL 2 TIMES DAILY
Qty: 10 TABLET | Refills: 0 | Status: SHIPPED | OUTPATIENT
Start: 2021-09-16 | End: 2021-11-23

## 2021-09-16 RX ORDER — FLUCONAZOLE 150 MG/1
150 TABLET ORAL ONCE
Qty: 1 TABLET | Refills: 0 | Status: SHIPPED | OUTPATIENT
Start: 2021-09-16 | End: 2021-09-16

## 2021-09-16 ASSESSMENT — MIFFLIN-ST. JEOR: SCORE: 1217.34

## 2021-09-16 ASSESSMENT — PAIN SCALES - GENERAL: PAINLEVEL: NO PAIN (0)

## 2021-09-16 NOTE — PROGRESS NOTES
"    Assessment & Plan       ICD-10-CM    1. Dysuria  R30.0 UA with Microscopic reflex to Culture - MICHELA     sulfamethoxazole-trimethoprim (BACTRIM DS) 800-160 MG tablet   2. Acute cystitis with hematuria  N30.01 sulfamethoxazole-trimethoprim (BACTRIM DS) 800-160 MG tablet   3. Yeast infection of the vagina  B37.3 fluconazole (DIFLUCAN) 150 MG tablet     UA positive for UTI, will be sent to culture. Discussed results with patient as well as antibiotic dosing. Due to current vaginal pruritis and antibiotic order, will order diflucan as well. Encouraged patient to increase fluid intake. Contact clinic if symptoms persist or worsen. Will contact patient once culture returns if sensitivities differ from what is ordered. Symptomatic treatment was discussed along when patient should call and/or come back into the clinic or go to ER/Urgent care. All questions answered.       BMI:   Estimated body mass index is 29.07 kg/m  as calculated from the following:    Height as of this encounter: 1.58 m (5' 2.21\").    Weight as of this encounter: 72.6 kg (160 lb).       Robert Blakely MD  [facility] - MICHELA Zepeda is a 66 year old who presents for the following health issues     HPI     Genitourinary - Female  Onset/Duration: 3 days  Description:   Painful urination (Dysuria): YES           Frequency: YES  Blood in urine (Hematuria): no  Delay in urine (Hesitency): no  Intensity: moderate  Progression of Symptoms:  worsening  Accompanying Signs & Symptoms:  Fever/chills: no  Flank pain: no  Nausea and vomiting: no  Vaginal symptoms: mild genital area pruritis   Abdominal/Pelvic Pain: YES  History:   History of frequent UTI s: YES  History of kidney stones: no  Sexually Active: no  Possibility of pregnancy: No  Precipitating or alleviating factors: None  Therapies tried and outcome:  none     Denies F/C/N/V/C/D. Fredo anything new or changed in her hygiene routine or in regard to products being " "used. Has had a UTI in the remote past and reports symptoms are identical.     Review of Systems   Constitutional, HEENT, cardiovascular, pulmonary, gi and gu systems are negative, except as otherwise noted.      Objective    /68   Pulse 78   Temp 97.6  F (36.4  C)   Ht 1.58 m (5' 2.21\")   Wt 72.6 kg (160 lb)   SpO2 97%   BMI 29.07 kg/m    Body mass index is 29.07 kg/m .  Physical Exam   GENERAL: healthy, alert and no distress  NECK: no adenopathy, no asymmetry, masses, or scars and thyroid normal to palpation  RESP: lungs clear to auscultation - no rales, rhonchi or wheezes  CV: regular rate and rhythm, normal S1 S2, no S3 or S4, no murmur, click or rub, no peripheral edema and peripheral pulses strong  ABDOMEN: soft, slight tenderness LLQ on palpation, no hepatosplenomegaly, no masses and bowel sounds normal  MS: no gross musculoskeletal defects noted, no edema    Results for orders placed or performed in visit on 09/16/21   UA with Microscopic reflex to Culture - [place]     Status: Abnormal    Specimen: Urine, Clean Catch   Result Value Ref Range    Color Urine Orange (A) Colorless, Straw, Light Yellow, Yellow    Appearance Urine Slightly Cloudy (A) Clear    Glucose Urine Negative Negative mg/dL    Bilirubin Urine Negative Negative    Ketones Urine Negative Negative mg/dL    Specific Gravity Urine 1.010 1.003 - 1.035    Blood Urine Large (A) Negative    pH Urine 5.5 4.7 - 8.0    Protein Albumin Urine 50  (A) Negative mg/dL    Urobilinogen Urine Normal Normal, 2.0 mg/dL    Nitrite Urine Negative Negative    Leukocyte Esterase Urine Large (A) Negative    Bacteria Urine Few (A) None Seen /HPF    WBC Clumps Urine Present (A) None Seen /HPF    Mucus Urine Present (A) None Seen /LPF    RBC Urine 93 (H) <=2 /HPF    WBC Urine >182 (H) <=5 /HPF    Squamous Epithelials Urine 3 (H) <=1 /HPF    Transitional Epithelials Urine <1 <=1 /HPF    Narrative    Urine Culture ordered based on laboratory criteria         "

## 2021-09-16 NOTE — NURSING NOTE
"Chief Complaint   Patient presents with     UTI       Initial /68   Pulse 78   Temp 97.6  F (36.4  C)   Ht 1.58 m (5' 2.21\")   Wt 72.6 kg (160 lb)   SpO2 97%   BMI 29.07 kg/m   Estimated body mass index is 29.07 kg/m  as calculated from the following:    Height as of this encounter: 1.58 m (5' 2.21\").    Weight as of this encounter: 72.6 kg (160 lb).  Medication Reconciliation: complete  Klaus Gillette LPN  "

## 2021-09-18 LAB — BACTERIA UR CULT: ABNORMAL

## 2021-10-03 ENCOUNTER — HEALTH MAINTENANCE LETTER (OUTPATIENT)
Age: 66
End: 2021-10-03

## 2021-10-11 ENCOUNTER — TELEPHONE (OUTPATIENT)
Dept: FAMILY MEDICINE | Facility: OTHER | Age: 66
End: 2021-10-11

## 2021-10-11 DIAGNOSIS — E78.5 DYSLIPIDEMIA: ICD-10-CM

## 2021-10-11 DIAGNOSIS — Z00.00 ROUTINE GENERAL MEDICAL EXAMINATION AT A HEALTH CARE FACILITY: Primary | ICD-10-CM

## 2021-10-11 DIAGNOSIS — R73.03 PRE-DIABETES: ICD-10-CM

## 2021-10-11 DIAGNOSIS — Z12.31 VISIT FOR SCREENING MAMMOGRAM: Primary | ICD-10-CM

## 2021-10-11 NOTE — TELEPHONE ENCOUNTER
4:02 PM    Reason for Call: Phone Call    Description: Patient called and scheduled her yearly physical with Dr. Blakely for 11/23/2021 and she is requesting to have her lab work done prior to her appt -- if lab orders are put in for her, please reach out so she can get a lab appt scheduled     Was an appointment offered for this call? Yes  If yes : Appointment type PHYSICAL              Date 11/23/2021    Preferred method for responding to this message: Telephone Call  What is your phone number ?[phone]    If we cannot reach you directly, may we leave a detailed response at the number you provided? Yes    Can this message wait until your PCP/provider returns, if available today? Not applicable    Rajwinder Cruz

## 2021-11-11 ENCOUNTER — LAB (OUTPATIENT)
Dept: LAB | Facility: OTHER | Age: 66
End: 2021-11-11
Attending: FAMILY MEDICINE
Payer: COMMERCIAL

## 2021-11-11 DIAGNOSIS — E78.5 DYSLIPIDEMIA: ICD-10-CM

## 2021-11-11 DIAGNOSIS — R73.03 PRE-DIABETES: ICD-10-CM

## 2021-11-11 DIAGNOSIS — Z00.00 ROUTINE GENERAL MEDICAL EXAMINATION AT A HEALTH CARE FACILITY: ICD-10-CM

## 2021-11-11 LAB
ALBUMIN SERPL-MCNC: 3.6 G/DL (ref 3.4–5)
ALP SERPL-CCNC: 88 U/L (ref 40–150)
ALT SERPL W P-5'-P-CCNC: 25 U/L (ref 0–50)
ANION GAP SERPL CALCULATED.3IONS-SCNC: 4 MMOL/L (ref 3–14)
AST SERPL W P-5'-P-CCNC: 20 U/L (ref 0–45)
BASOPHILS # BLD AUTO: 0.1 10E3/UL (ref 0–0.2)
BASOPHILS NFR BLD AUTO: 1 %
BILIRUB SERPL-MCNC: 0.5 MG/DL (ref 0.2–1.3)
BUN SERPL-MCNC: 21 MG/DL (ref 7–30)
CALCIUM SERPL-MCNC: 8.5 MG/DL (ref 8.5–10.1)
CHLORIDE BLD-SCNC: 107 MMOL/L (ref 94–109)
CHOLEST SERPL-MCNC: 213 MG/DL
CO2 SERPL-SCNC: 24 MMOL/L (ref 20–32)
CREAT SERPL-MCNC: 1.04 MG/DL (ref 0.52–1.04)
EOSINOPHIL # BLD AUTO: 0.4 10E3/UL (ref 0–0.7)
EOSINOPHIL NFR BLD AUTO: 4 %
ERYTHROCYTE [DISTWIDTH] IN BLOOD BY AUTOMATED COUNT: 13.4 % (ref 10–15)
FASTING STATUS PATIENT QL REPORTED: YES
GFR SERPL CREATININE-BSD FRML MDRD: 56 ML/MIN/1.73M2
GLUCOSE BLD-MCNC: 99 MG/DL (ref 70–99)
HCT VFR BLD AUTO: 40.1 % (ref 35–47)
HDLC SERPL-MCNC: 47 MG/DL
HGB BLD-MCNC: 13.5 G/DL (ref 11.7–15.7)
IMM GRANULOCYTES # BLD: 0 10E3/UL
IMM GRANULOCYTES NFR BLD: 0 %
LDLC SERPL CALC-MCNC: 127 MG/DL
LYMPHOCYTES # BLD AUTO: 3.9 10E3/UL (ref 0.8–5.3)
LYMPHOCYTES NFR BLD AUTO: 46 %
MCH RBC QN AUTO: 28.8 PG (ref 26.5–33)
MCHC RBC AUTO-ENTMCNC: 33.7 G/DL (ref 31.5–36.5)
MCV RBC AUTO: 86 FL (ref 78–100)
MONOCYTES # BLD AUTO: 0.5 10E3/UL (ref 0–1.3)
MONOCYTES NFR BLD AUTO: 6 %
NEUTROPHILS # BLD AUTO: 3.6 10E3/UL (ref 1.6–8.3)
NEUTROPHILS NFR BLD AUTO: 43 %
NONHDLC SERPL-MCNC: 166 MG/DL
NRBC # BLD AUTO: 0 10E3/UL
NRBC BLD AUTO-RTO: 0 /100
PLATELET # BLD AUTO: 301 10E3/UL (ref 150–450)
POTASSIUM BLD-SCNC: 4.1 MMOL/L (ref 3.4–5.3)
PROT SERPL-MCNC: 8.1 G/DL (ref 6.8–8.8)
RBC # BLD AUTO: 4.69 10E6/UL (ref 3.8–5.2)
SODIUM SERPL-SCNC: 135 MMOL/L (ref 133–144)
TRIGL SERPL-MCNC: 194 MG/DL
TSH SERPL DL<=0.005 MIU/L-ACNC: 2.39 MU/L (ref 0.4–4)
WBC # BLD AUTO: 8.5 10E3/UL (ref 4–11)

## 2021-11-11 PROCEDURE — 82040 ASSAY OF SERUM ALBUMIN: CPT | Mod: ZL

## 2021-11-11 PROCEDURE — 36415 COLL VENOUS BLD VENIPUNCTURE: CPT | Mod: ZL

## 2021-11-11 PROCEDURE — 82465 ASSAY BLD/SERUM CHOLESTEROL: CPT | Mod: ZL

## 2021-11-11 PROCEDURE — 84443 ASSAY THYROID STIM HORMONE: CPT | Mod: ZL

## 2021-11-11 PROCEDURE — 85025 COMPLETE CBC W/AUTO DIFF WBC: CPT | Mod: ZL

## 2021-11-16 NOTE — PROGRESS NOTES
"SUBJECTIVE:   Katelyn Manley is a 66 year old female who presents for Preventive Visit.      Pt is here for comprehensive yearly follow up.       Patient has been advised of split billing requirements and indicates understanding: No   Are you in the first 12 months of your Medicare coverage?  No    Healthy Habits:    In general, how would you rate your overall health?  Excellent    Frequency of exercise:  1 day/week    Duration of exercise:  Less than 15 minutes    Do you usually eat at least 4 servings of fruit and vegetables a day, include whole grains    & fiber and avoid regularly eating high fat or \"junk\" foods?  No    Taking medications regularly:  Yes    Medication side effects:  None    Ability to successfully perform activities of daily living:  No assistance needed    Home Safety:  No safety concerns identified    Hearing Impairment:  Difficulty following a conversation in a noisy restaurant or crowded room and difficulty understanding soft or whispered speech    In the past 6 months, have you been bothered by leaking of urine? Yes    In general, how would you rate your overall mental or emotional health?  Good      PHQ-2 Total Score:    Additional concerns today:  Yes    Do you feel safe in your environment? Yes    Have you ever done Advance Care Planning? (For example, a Health Directive, POLST, or a discussion with a medical provider or your loved ones about your wishes): Yes, patient states has an Advance Care Planning document and will bring a copy to the clinic.       Fall risk  Fallen 2 or more times in the past year?: No  Any fall with injury in the past year?: No    Cognitive Screening   1) Repeat 3 items (Leader, Season, Table)    2) Clock draw: NORMAL  3) 3 item recall: Recalls 3 objects  Results: 3 items recalled: COGNITIVE IMPAIRMENT LESS LIKELY    Mini-CogTM Copyright CARMEN Anderson. Licensed by the author for use in [facility]; reprinted with permission (madi@.[facility]). All rights " reserved.      Do you have sleep apnea, excessive snoring or daytime drowsiness?: no    Reviewed and updated as needed this visit by clinical staff                Reviewed and updated as needed this visit by Provider               Social History     Tobacco Use     Smoking status: Never Smoker     Smokeless tobacco: Never Used   Substance Use Topics     Alcohol use: No     Alcohol/week: 0.0 standard drinks     If you drink alcohol do you typically have >3 drinks per day or >7 drinks per week? No    Alcohol Use 10/25/2020   Prescreen: >3 drinks/day or >7 drinks/week? No           Hyperlipidemia Follow-Up      Are you regularly taking any medication or supplement to lower your cholesterol?   No    Are you having muscle aches or other side effects that you think could be caused by your cholesterol lowering medication?  No    Depression Followup    How are you doing with your depression since your last visit? No change    Are you having other symptoms that might be associated with depression? No    Have you had a significant life event?  No     Are you feeling anxious or having panic attacks?   No    Do you have any concerns with your use of alcohol or other drugs? No    Social History     Tobacco Use     Smoking status: Never Smoker     Smokeless tobacco: Never Used   Substance Use Topics     Alcohol use: No     Alcohol/week: 0.0 standard drinks     Drug use: No     PHQ 10/26/2020 5/5/2021 11/23/2021   PHQ-9 Total Score 0 2 0   Q9: Thoughts of better off dead/self-harm past 2 weeks Not at all Not at all Not at all     PENELOPE-7 SCORE 10/26/2020 5/5/2021 11/23/2021   Total Score 0 0 1     Last PHQ-9 11/23/2021   1.  Little interest or pleasure in doing things 0   2.  Feeling down, depressed, or hopeless 0   3.  Trouble falling or staying asleep, or sleeping too much 0   4.  Feeling tired or having little energy 0   5.  Poor appetite or overeating 0   6.  Feeling bad about yourself 0   7.  Trouble concentrating 0   8.   "Moving slowly or restless 0   Q9: Thoughts of better off dead/self-harm past 2 weeks 0   PHQ-9 Total Score 0   Difficulty at work, home, or with people -     PENELOPE-7  11/23/2021   1. Feeling nervous, anxious, or on edge 0   2. Not being able to stop or control worrying 0   3. Worrying too much about different things 0   4. Trouble relaxing 0   5. Being so restless that it is hard to sit still 0   6. Becoming easily annoyed or irritable 1   7. Feeling afraid, as if something awful might happen 0   PENELOPE-7 Total Score 1   If you checked any problems, how difficult have they made it for you to do your work, take care of things at home, or get along with other people? Not difficult at all       Suicide Assessment Five-step Evaluation and Treatment (SAFE-T)      Current providers sharing in care for this patient include:   Patient Care Team:  Robert Blakely MD as PCP - General  Robert Blakely MD as Assigned PCP    The following health maintenance items are reviewed in Epic and correct as of today:  Health Maintenance Due   Topic Date Due     STOOL GUIA  09/28/2018     DTAP/TDAP/TD IMMUNIZATION (9 - Td or Tdap) 07/21/2020     INFLUENZA VACCINE (1) 09/01/2021     PENELOPE ASSESSMENT  11/05/2021     MEDICARE ANNUAL WELLNESS VISIT  10/26/2021     Pneumococcal Vaccine: 65+ Years (2 of 2 - PPSV23) 10/26/2021     PHQ-9  11/05/2021     Labs reviewed in EPIC    Any new diagnosis of family breast, ovarian, or bowel cancer?     FHS-7: No flowsheet data found.      Pertinent mammograms are reviewed under the imaging tab.    Review of Systems  Constitutional, HEENT, cardiovascular, pulmonary, GI, , musculoskeletal, neuro, skin, endocrine and psych systems are negative, except as otherwise noted.    OBJECTIVE:   /70   Pulse 63   Temp 97.4  F (36.3  C)   Ht 1.594 m (5' 2.75\")   Wt 72.1 kg (159 lb)   SpO2 96%   BMI 28.39 kg/m   Estimated body mass index is 29.07 kg/m  as calculated from the following:    Height as of 9/16/21: " "1.58 m (5' 2.21\").    Weight as of 9/16/21: 72.6 kg (160 lb).  Physical Exam  GENERAL: healthy, alert and no distress  EYES: Eyes grossly normal to inspection, PERRL and conjunctivae and sclerae normal  HENT: ear canals and TM's normal, nose and mouth without ulcers or lesions  NECK: no adenopathy, no asymmetry, masses, or scars and thyroid normal to palpation  RESP: lungs clear to auscultation - no rales, rhonchi or wheezes  BREAST: normal without masses, tenderness or nipple discharge and no palpable axillary masses or adenopathy  CV: regular rate and rhythm, normal S1 S2, no S3 or S4, no murmur, click or rub, no peripheral edema and peripheral pulses strong  ABDOMEN: soft, nontender, no hepatosplenomegaly, no masses and bowel sounds normal  MS: no gross musculoskeletal defects noted, no edema  SKIN: no suspicious lesions or rashes  NEURO: Normal strength and tone, mentation intact and speech normal  PSYCH: mentation appears normal, affect normal/bright  LYMPH: no cervical, supraclavicular, axillary, or inguinal adenopathy    No results found for this or any previous visit (from the past 48 hour(s)).    Lab on 11/11/2021   Component Date Value Ref Range Status     Sodium 11/11/2021 135  133 - 144 mmol/L Final     Potassium 11/11/2021 4.1  3.4 - 5.3 mmol/L Final     Chloride 11/11/2021 107  94 - 109 mmol/L Final     Carbon Dioxide (CO2) 11/11/2021 24  20 - 32 mmol/L Final     Anion Gap 11/11/2021 4  3 - 14 mmol/L Final     Urea Nitrogen 11/11/2021 21  7 - 30 mg/dL Final     Creatinine 11/11/2021 1.04  0.52 - 1.04 mg/dL Final     Calcium 11/11/2021 8.5  8.5 - 10.1 mg/dL Final     Glucose 11/11/2021 99  70 - 99 mg/dL Final     Alkaline Phosphatase 11/11/2021 88  40 - 150 U/L Final     AST 11/11/2021 20  0 - 45 U/L Final     ALT 11/11/2021 25  0 - 50 U/L Final     Protein Total 11/11/2021 8.1  6.8 - 8.8 g/dL Final     Albumin 11/11/2021 3.6  3.4 - 5.0 g/dL Final     Bilirubin Total 11/11/2021 0.5  0.2 - 1.3 mg/dL " Final     GFR Estimate 11/11/2021 56* >60 mL/min/1.73m2 Final    As of July 11, 2021, eGFR is calculated by the CKD-EPI creatinine equation, without race adjustment. eGFR can be influenced by muscle mass, exercise, and diet. The reported eGFR is an estimation only and is only applicable if the renal function is stable.     Cholesterol 11/11/2021 213* <200 mg/dL Final     Triglycerides 11/11/2021 194* <150 mg/dL Final     Direct Measure HDL 11/11/2021 47* >=50 mg/dL Final     LDL Cholesterol Calculated 11/11/2021 127* <=100 mg/dL Final     Non HDL Cholesterol 11/11/2021 166* <130 mg/dL Final     Patient Fasting > 8hrs? 11/11/2021 Yes   Final     TSH 11/11/2021 2.39  0.40 - 4.00 mU/L Final     WBC Count 11/11/2021 8.5  4.0 - 11.0 10e3/uL Final     RBC Count 11/11/2021 4.69  3.80 - 5.20 10e6/uL Final     Hemoglobin 11/11/2021 13.5  11.7 - 15.7 g/dL Final     Hematocrit 11/11/2021 40.1  35.0 - 47.0 % Final     MCV 11/11/2021 86  78 - 100 fL Final     MCH 11/11/2021 28.8  26.5 - 33.0 pg Final     MCHC 11/11/2021 33.7  31.5 - 36.5 g/dL Final     RDW 11/11/2021 13.4  10.0 - 15.0 % Final     Platelet Count 11/11/2021 301  150 - 450 10e3/uL Final     % Neutrophils 11/11/2021 43  % Final     % Lymphocytes 11/11/2021 46  % Final     % Monocytes 11/11/2021 6  % Final     % Eosinophils 11/11/2021 4  % Final     % Basophils 11/11/2021 1  % Final     % Immature Granulocytes 11/11/2021 0  % Final     NRBCs per 100 WBC 11/11/2021 0  <1 /100 Final     Absolute Neutrophils 11/11/2021 3.6  1.6 - 8.3 10e3/uL Final     Absolute Lymphocytes 11/11/2021 3.9  0.8 - 5.3 10e3/uL Final     Absolute Monocytes 11/11/2021 0.5  0.0 - 1.3 10e3/uL Final     Absolute Eosinophils 11/11/2021 0.4  0.0 - 0.7 10e3/uL Final     Absolute Basophils 11/11/2021 0.1  0.0 - 0.2 10e3/uL Final     Absolute Immature Granulocytes 11/11/2021 0.0  <=0.0 10e3/uL Final     Absolute NRBCs 11/11/2021 0.0  10e3/uL Final         ASSESSMENT / PLAN:   (Z00.00) Routine general  "medical examination at a health care facility  (primary encounter diagnosis)  Comment: overall doing well.   Plan: keep up on exercise and keeping wt off.   Mammogram in 2 weeks    (E78.5) Dyslipidemia  Comment: discussed. Does have fh of cad. After long discussion - will start low dose statin   Plan: atorvastatin (LIPITOR) 10 MG tablet, Lipid         Profile, Hepatic function panel        Risk and benefits of statin  was discussed and verbal consent to proceed was given.     (R73.03) Pre-diabetes  Comment: in past   Plan: doing better with labs today     (Z12.11) Special screening for malignant neoplasms, colon  Comment: discussed. Needs in Feb. Will be in [place] til March. Will refer- wants to see Priscilla and have it here. Used to see Dr Sanchez  Plan: Adult Gastro Ref - Procedure Only            (N18.31) Chronic kidney disease, stage 3a (H)  Comment: very mild discussed  Plan: will watch. Discussed to be careful with NSAIDS    (F43.23) Adjustment disorder with mixed anxiety and depressed mood  Comment: stable   Plan: continue meds. Continue current medications and behavioral changes.     (Z23) Need for prophylactic vaccination and inoculation against influenza  Comment: shot given   Plan: INFLUENZA, QUAD, HIGH DOSE, PF, 65YR + (FLUZONE        HD)              Patient has been advised of split billing requirements and indicates understanding:   COUNSELING:  Reviewed preventive health counseling, as reflected in patient instructions       Regular exercise       Healthy diet/nutrition       Colon cancer screening    Estimated body mass index is 29.07 kg/m  as calculated from the following:    Height as of 9/16/21: 1.58 m (5' 2.21\").    Weight as of 9/16/21: 72.6 kg (160 lb).        She reports that she has never smoked. She has never used smokeless tobacco.      Appropriate preventive services were discussed with this patient, including applicable screening as appropriate for cardiovascular disease, diabetes, " osteopenia/osteoporosis, and glaucoma.  As appropriate for age/gender, discussed screening for colorectal cancer, prostate cancer, breast cancer, and cervical cancer. Checklist reviewing preventive services available has been given to the patient.    Reviewed patients plan of care and provided an AVS. The Basic Care Plan (routine screening as documented in Health Maintenance) for Katelyn meets the Care Plan requirement. This Care Plan has been established and reviewed with the Patient.    Counseling Resources:  ATP IV Guidelines  Pooled Cohorts Equation Calculator  Breast Cancer Risk Calculator  Breast Cancer: Medication to Reduce Risk  FRAX Risk Assessment  ICSI Preventive Guidelines  Dietary Guidelines for Americans, 2010  USDA's MyPlate  ASA Prophylaxis  Lung CA Screening    Robert Blakely MD  [facility] - [place]    Identified Health Risks:

## 2021-11-16 NOTE — PATIENT INSTRUCTIONS
Preventive Health Recommendations    See your health care provider every year to    Review health changes.     Discuss preventive care.      Review your medicines if your doctor has prescribed any.      You no longer need a yearly Pap test unless you've had an abnormal Pap test in the past 10 years. If you have vaginal symptoms, such as bleeding or discharge, be sure to talk with your provider about a Pap test.      Every 1 to 2 years, have a mammogram.  If you are over 69, talk with your health care provider about whether or not you want to continue having screening mammograms.      Every 10 years, have a colonoscopy. Or, have a yearly FIT test (stool test). These exams will check for colon cancer.       Have a cholesterol test every 5 years, or more often if your doctor advises it.       Have a diabetes test (fasting glucose) every three years. If you are at risk for diabetes, you should have this test more often.       At age 65, have a bone density scan (DEXA) to check for osteoporosis (brittle bone disease).    Shots:    Get a flu shot each year.    Get a tetanus shot every 10 years.    Talk to your doctor about your pneumonia vaccines. There are now two you should receive - Pneumovax (PPSV 23) and Prevnar (PCV 13).    Talk to your pharmacist about the shingles vaccine.    Talk to your doctor about the hepatitis B vaccine.    Nutrition:     Eat at least 5 servings of fruits and vegetables each day.      Eat whole-grain bread, whole-wheat pasta and brown rice instead of white grains and rice.      Get adequate about Calcium and Vitamin D.     Lifestyle    Exercise at least 150 minutes a week (30 minutes a day, 5 days a week). This will help you control your weight and prevent disease.      Limit alcohol to one drink per day.      No smoking.       Wear sunscreen to prevent skin cancer.       See your dentist twice a year for an exam and cleaning.      See your eye doctor every 1 to 2 years to screen for  conditions such as glaucoma, macular degeneration, cataracts, etc.    Personalized Prevention Plan  You are due for the preventive services outlined below.  Your care team is available to assist you in scheduling these services.  If you have already completed any of these items, please share that information with your care team to update in your medical record.    Health Maintenance Due   Topic Date Due     Stool [facility] - yearly  09/28/2018     Diptheria Tetanus Pertussis (DTAP/TDAP/TD) Vaccine (9 - Td or Tdap) 07/21/2020     Flu Vaccine (1) 09/01/2021     Anxiety Assessment  11/05/2021     Annual Wellness Visit  10/26/2021     Pneumococcal Vaccine (2 of 2 - PPSV23) 10/26/2021     Depression Assessment  11/05/2021

## 2021-11-17 ENCOUNTER — OFFICE VISIT (OUTPATIENT)
Dept: CHIROPRACTIC MEDICINE | Facility: OTHER | Age: 66
End: 2021-11-17
Attending: CHIROPRACTOR
Payer: COMMERCIAL

## 2021-11-17 DIAGNOSIS — M99.02 SEGMENTAL AND SOMATIC DYSFUNCTION OF THORACIC REGION: ICD-10-CM

## 2021-11-17 DIAGNOSIS — M99.01 SEGMENTAL AND SOMATIC DYSFUNCTION OF CERVICAL REGION: ICD-10-CM

## 2021-11-17 DIAGNOSIS — M54.50 ACUTE BILATERAL LOW BACK PAIN WITHOUT SCIATICA: ICD-10-CM

## 2021-11-17 DIAGNOSIS — M99.03 SEGMENTAL AND SOMATIC DYSFUNCTION OF LUMBAR REGION: Primary | ICD-10-CM

## 2021-11-17 PROCEDURE — 98941 CHIROPRACT MANJ 3-4 REGIONS: CPT | Mod: AT | Performed by: CHIROPRACTOR

## 2021-11-22 ASSESSMENT — ACTIVITIES OF DAILY LIVING (ADL): CURRENT_FUNCTION: NO ASSISTANCE NEEDED

## 2021-11-23 ENCOUNTER — OFFICE VISIT (OUTPATIENT)
Dept: FAMILY MEDICINE | Facility: OTHER | Age: 66
End: 2021-11-23
Attending: FAMILY MEDICINE
Payer: COMMERCIAL

## 2021-11-23 VITALS
TEMPERATURE: 97.4 F | BODY MASS INDEX: 28.17 KG/M2 | HEIGHT: 63 IN | WEIGHT: 159 LBS | OXYGEN SATURATION: 96 % | HEART RATE: 63 BPM | SYSTOLIC BLOOD PRESSURE: 106 MMHG | DIASTOLIC BLOOD PRESSURE: 70 MMHG

## 2021-11-23 DIAGNOSIS — N18.31 CHRONIC KIDNEY DISEASE, STAGE 3A (H): ICD-10-CM

## 2021-11-23 DIAGNOSIS — Z12.11 SPECIAL SCREENING FOR MALIGNANT NEOPLASMS, COLON: ICD-10-CM

## 2021-11-23 DIAGNOSIS — E78.5 DYSLIPIDEMIA: ICD-10-CM

## 2021-11-23 DIAGNOSIS — R73.03 PRE-DIABETES: ICD-10-CM

## 2021-11-23 DIAGNOSIS — F43.23 ADJUSTMENT DISORDER WITH MIXED ANXIETY AND DEPRESSED MOOD: ICD-10-CM

## 2021-11-23 DIAGNOSIS — Z00.00 ROUTINE GENERAL MEDICAL EXAMINATION AT A HEALTH CARE FACILITY: Primary | ICD-10-CM

## 2021-11-23 DIAGNOSIS — Z23 NEED FOR PROPHYLACTIC VACCINATION AND INOCULATION AGAINST INFLUENZA: ICD-10-CM

## 2021-11-23 PROCEDURE — G0008 ADMIN INFLUENZA VIRUS VAC: HCPCS

## 2021-11-23 PROCEDURE — 99397 PER PM REEVAL EST PAT 65+ YR: CPT | Performed by: FAMILY MEDICINE

## 2021-11-23 PROCEDURE — G0463 HOSPITAL OUTPT CLINIC VISIT: HCPCS | Mod: 25

## 2021-11-23 PROCEDURE — 99212 OFFICE O/P EST SF 10 MIN: CPT | Mod: 25 | Performed by: FAMILY MEDICINE

## 2021-11-23 PROCEDURE — 90662 IIV NO PRSV INCREASED AG IM: CPT

## 2021-11-23 RX ORDER — ATORVASTATIN CALCIUM 10 MG/1
10 TABLET, FILM COATED ORAL DAILY
Qty: 60 TABLET | Refills: 0 | Status: SHIPPED | OUTPATIENT
Start: 2021-11-23 | End: 2021-12-27

## 2021-11-23 ASSESSMENT — ANXIETY QUESTIONNAIRES
5. BEING SO RESTLESS THAT IT IS HARD TO SIT STILL: NOT AT ALL
IF YOU CHECKED OFF ANY PROBLEMS ON THIS QUESTIONNAIRE, HOW DIFFICULT HAVE THESE PROBLEMS MADE IT FOR YOU TO DO YOUR WORK, TAKE CARE OF THINGS AT HOME, OR GET ALONG WITH OTHER PEOPLE: NOT DIFFICULT AT ALL
7. FEELING AFRAID AS IF SOMETHING AWFUL MIGHT HAPPEN: NOT AT ALL
2. NOT BEING ABLE TO STOP OR CONTROL WORRYING: NOT AT ALL
GAD7 TOTAL SCORE: 1
3. WORRYING TOO MUCH ABOUT DIFFERENT THINGS: NOT AT ALL
4. TROUBLE RELAXING: NOT AT ALL
6. BECOMING EASILY ANNOYED OR IRRITABLE: SEVERAL DAYS
1. FEELING NERVOUS, ANXIOUS, OR ON EDGE: NOT AT ALL

## 2021-11-23 ASSESSMENT — ACTIVITIES OF DAILY LIVING (ADL): CURRENT_FUNCTION: NO ASSISTANCE NEEDED

## 2021-11-23 ASSESSMENT — MIFFLIN-ST. JEOR: SCORE: 1221.38

## 2021-11-23 ASSESSMENT — PAIN SCALES - GENERAL: PAINLEVEL: NO PAIN (0)

## 2021-11-23 NOTE — NURSING NOTE
"Chief Complaint   Patient presents with     Physical       Initial /70   Pulse 63   Temp 97.4  F (36.3  C)   Ht 1.594 m (5' 2.75\")   Wt 72.1 kg (159 lb)   SpO2 96%   BMI 28.39 kg/m   Estimated body mass index is 28.39 kg/m  as calculated from the following:    Height as of this encounter: 1.594 m (5' 2.75\").    Weight as of this encounter: 72.1 kg (159 lb).  Medication Reconciliation: complete  Klaus iGllette LPN  "

## 2021-11-24 ASSESSMENT — ANXIETY QUESTIONNAIRES: GAD7 TOTAL SCORE: 1

## 2021-11-24 ASSESSMENT — PATIENT HEALTH QUESTIONNAIRE - PHQ9: SUM OF ALL RESPONSES TO PHQ QUESTIONS 1-9: 0

## 2021-12-13 ENCOUNTER — TELEPHONE (OUTPATIENT)
Dept: FAMILY MEDICINE | Facility: OTHER | Age: 66
End: 2021-12-13
Payer: COMMERCIAL

## 2021-12-13 NOTE — TELEPHONE ENCOUNTER
1:51 PM    Reason for Call: Phone Call    Description: Patient would like to speak to Dr Blakely nurse regarding a new diagnoses that was put in her chart and it is upsetting her and now very worried.    Was an appointment offered for this call? No  If yes : Appointment type              Date    Preferred method for responding to this message: Telephone Call  What is your phone number ? [phone]    If we cannot reach you directly, may we leave a detailed response at the number you provided? Yes    Can this message wait until your PCP/provider returns, if available today?  Elayne Szymanski

## 2021-12-14 ENCOUNTER — ANCILLARY PROCEDURE (OUTPATIENT)
Dept: MAMMOGRAPHY | Facility: OTHER | Age: 66
End: 2021-12-14
Attending: FAMILY MEDICINE
Payer: COMMERCIAL

## 2021-12-14 ENCOUNTER — TELEPHONE (OUTPATIENT)
Dept: MAMMOGRAPHY | Facility: OTHER | Age: 66
End: 2021-12-14
Payer: COMMERCIAL

## 2021-12-14 DIAGNOSIS — Z12.31 VISIT FOR SCREENING MAMMOGRAM: ICD-10-CM

## 2021-12-14 PROCEDURE — 77063 BREAST TOMOSYNTHESIS BI: CPT | Mod: TC

## 2021-12-27 ENCOUNTER — TELEPHONE (OUTPATIENT)
Dept: SURGERY | Facility: OTHER | Age: 66
End: 2021-12-27

## 2021-12-27 ENCOUNTER — LAB (OUTPATIENT)
Dept: LAB | Facility: OTHER | Age: 66
End: 2021-12-27
Payer: COMMERCIAL

## 2021-12-27 DIAGNOSIS — E78.5 DYSLIPIDEMIA: ICD-10-CM

## 2021-12-27 LAB
ALBUMIN SERPL-MCNC: 3.5 G/DL (ref 3.4–5)
ALP SERPL-CCNC: 82 U/L (ref 40–150)
ALT SERPL W P-5'-P-CCNC: 23 U/L (ref 0–50)
AST SERPL W P-5'-P-CCNC: 20 U/L (ref 0–45)
BILIRUB DIRECT SERPL-MCNC: 0.1 MG/DL (ref 0–0.2)
BILIRUB SERPL-MCNC: 0.4 MG/DL (ref 0.2–1.3)
CHOLEST SERPL-MCNC: 155 MG/DL
FASTING STATUS PATIENT QL REPORTED: YES
HDLC SERPL-MCNC: 50 MG/DL
LDLC SERPL CALC-MCNC: 72 MG/DL
NONHDLC SERPL-MCNC: 105 MG/DL
PROT SERPL-MCNC: 7.6 G/DL (ref 6.8–8.8)
TRIGL SERPL-MCNC: 166 MG/DL

## 2021-12-27 PROCEDURE — 36415 COLL VENOUS BLD VENIPUNCTURE: CPT | Mod: ZL

## 2021-12-27 PROCEDURE — 80061 LIPID PANEL: CPT | Mod: ZL

## 2021-12-27 PROCEDURE — 82040 ASSAY OF SERUM ALBUMIN: CPT | Mod: ZL

## 2021-12-27 RX ORDER — ATORVASTATIN CALCIUM 10 MG/1
10 TABLET, FILM COATED ORAL DAILY
Qty: 90 TABLET | Refills: 1 | Status: SHIPPED | OUTPATIENT
Start: 2021-12-27 | End: 2022-07-18

## 2021-12-27 NOTE — TELEPHONE ENCOUNTER
Patient called to let us know that had, had a screening colonoscopy with Dr Sanchez @ [facility] in 2019 and said everything was normal- There is a recall for patient but it is not needed.

## 2021-12-27 NOTE — TELEPHONE ENCOUNTER
No record of colonoscopy from [facility] scanned to her chart.   Release of Information for op note and pathology report from 2019 colonoscopy with Dr. Sanchez at [facility] to her PCP mailed to patient for signature and return so her chart and health maintenance can be properly updated. Attempted to call patient to discuss, but she did not answer. Left message for patient to return call at [phone].

## 2021-12-29 NOTE — TELEPHONE ENCOUNTER
Patient notified by phone. She will be leaving soon for the winter returning in March. If she does not receive release in the mail before leaving, she will sign it upon her return.

## 2022-02-17 PROBLEM — J98.9 REACTIVE AIRWAY DISEASE WITHOUT ASTHMA: Status: ACTIVE | Noted: 2018-04-04

## 2022-04-06 ENCOUNTER — NURSE TRIAGE (OUTPATIENT)
Dept: FAMILY MEDICINE | Facility: OTHER | Age: 67
End: 2022-04-06
Payer: COMMERCIAL

## 2022-04-06 ENCOUNTER — OFFICE VISIT (OUTPATIENT)
Dept: FAMILY MEDICINE | Facility: OTHER | Age: 67
End: 2022-04-06
Attending: FAMILY MEDICINE
Payer: COMMERCIAL

## 2022-04-06 VITALS
WEIGHT: 160 LBS | RESPIRATION RATE: 16 BRPM | BODY MASS INDEX: 28.57 KG/M2 | TEMPERATURE: 97.9 F | DIASTOLIC BLOOD PRESSURE: 76 MMHG | HEART RATE: 83 BPM | OXYGEN SATURATION: 96 % | SYSTOLIC BLOOD PRESSURE: 112 MMHG

## 2022-04-06 DIAGNOSIS — G24.3 SPASMODIC TORTICOLLIS: Primary | ICD-10-CM

## 2022-04-06 DIAGNOSIS — M54.2 CERVICALGIA: ICD-10-CM

## 2022-04-06 PROCEDURE — 99213 OFFICE O/P EST LOW 20 MIN: CPT | Performed by: FAMILY MEDICINE

## 2022-04-06 PROCEDURE — 96372 THER/PROPH/DIAG INJ SC/IM: CPT

## 2022-04-06 PROCEDURE — G0463 HOSPITAL OUTPT CLINIC VISIT: HCPCS | Mod: 25

## 2022-04-06 RX ORDER — KETOROLAC TROMETHAMINE 30 MG/ML
60 INJECTION, SOLUTION INTRAMUSCULAR; INTRAVENOUS ONCE
Status: COMPLETED | OUTPATIENT
Start: 2022-04-06 | End: 2022-04-06

## 2022-04-06 RX ORDER — OXYCODONE HYDROCHLORIDE 5 MG/1
5 TABLET ORAL EVERY 6 HOURS PRN
Qty: 12 TABLET | Refills: 0 | Status: SHIPPED | OUTPATIENT
Start: 2022-04-06 | End: 2022-04-09

## 2022-04-06 RX ADMIN — KETOROLAC TROMETHAMINE 60 MG: 30 INJECTION, SOLUTION INTRAMUSCULAR at 13:42

## 2022-04-06 ASSESSMENT — PAIN SCALES - GENERAL: PAINLEVEL: MODERATE PAIN (5)

## 2022-04-06 NOTE — PROGRESS NOTES
Assessment & Plan     Spasmodic torticollis  Cervicalgia    Discussed. Very tight and lots of spasm.  No XR or labs needed. Time and pain/spasm control. Toradol 60 MG IM given.  Oxy sparingly. Start Zanaflex. ROM and stretching discussed.  Continue Motrin.  Consider PT if not better.  NO chiro.   - tiZANidine (ZANAFLEX) 4 MG tablet; 1-2 tabs orally every 6-8 hours as needed muscle spasms  - oxyCODONE (ROXICODONE) 5 MG tablet; Take 1 tablet (5 mg) by mouth every 6 hours as needed for pain  - ketorolac (TORADOL) injection 60 mg    Symptomatic treatment was discussed along when patient should call and/or come back into the clinic or go to ER/Urgent care. All questions answered.   Discussed in length conservative measures of OTC medications for pain, Ice/Heat, elevation and the concept of R.I.C.E.. Continue behavioral changes and proper body mechanics to allow for healing. Follow up as directed.                        No follow-ups on file.    Robert Blakely MD  [facility] - MICHELA Zepeda is a 67 year old who presents for the following health issues     HPI     Pain History:  When did you first notice your pain? - Less than 1 week   Have you seen anyone else for your pain? No  Where in your body do you have pain? Neck Pain  Onset/Duration: 4 days  Description:   Location: neck both sides  Radiation: back of head  Intensity: moderate, severe  Progression of Symptoms:  improving and constant  Accompanying Signs & Symptoms:  Burning, tingling, prickly sensation in arm(s): no  Numbness in arm(s): no  Weakness in arm(s):  no  Fever: no  Headache: no  Nausea and/or vomiting: no  History:   Trauma: no  Previous neck pain: YES  Previous surgery or injections: no  Previous Imaging (MRI,X ray): no  Precipitating or alleviating factors: moving, being bumped  Does movement impact the pain:  YES  Therapies tried and outcome: ice,oxycodone, Ibuprofen 800 TID , chiropractor     Was coughing hard  several days before  Was exercising several days prior     No pain down arms  Chiropractor not help    Some better today - worse yesterday or 24 hrs after chiro        Review of Systems   Constitutional, HEENT, cardiovascular, pulmonary, gi and gu systems are negative, except as otherwise noted.      Objective    /76 (BP Location: Right arm, Patient Position: Sitting, Cuff Size: Adult Regular)   Pulse 83   Temp 97.9  F (36.6  C) (Tympanic)   Resp 16   Wt 72.6 kg (160 lb)   SpO2 96%   BMI 28.57 kg/m    Body mass index is 28.57 kg/m .  Physical Exam   GENERAL: healthy, alert and no distress  NECK: no adenopathy, no asymmetry, masses, or scars and thyroid normal to palpation-- very tender in posterior neck  Very limited ROM asa in rotation and extension. Flexion some better. No memigismal signs   MS:BUE strength and ROM intact

## 2022-04-06 NOTE — NURSING NOTE
"Chief Complaint   Patient presents with     Neck Pain       Initial /76 (BP Location: Right arm, Patient Position: Sitting, Cuff Size: Adult Regular)   Pulse 83   Temp 97.9  F (36.6  C) (Tympanic)   Resp 16   Wt 72.6 kg (160 lb)   SpO2 96%   BMI 28.57 kg/m   Estimated body mass index is 28.57 kg/m  as calculated from the following:    Height as of 11/23/21: 1.594 m (5' 2.75\").    Weight as of this encounter: 72.6 kg (160 lb).  Medication Reconciliation: complete  Leah Hutton LPN  "

## 2022-04-06 NOTE — TELEPHONE ENCOUNTER
"Pt's call back number: [phone] (Mobile)     Pt reports moderate to severe neck pain requesting to be seen. Denies any injury. Held appointment tomorrow AM pending approval from PCP's nurse. Pt willing to come in today if possible. Further assessment below.     Pt's requesting call back.     Reason for Disposition    [1] SEVERE neck pain (e.g., excruciating, unable to do any normal activities) AND [2] not improved after 2 hours of pain medicine    Additional Information    Negative: Shock suspected (e.g., cold/pale/clammy skin, too weak to stand, low BP, rapid pulse)    Negative: Difficult to awaken or acting confused (e.g., disoriented, slurred speech)    Negative: [1] Similar pain previously AND [2] it was from \"heart attack\"    Negative: [1] Similar pain previously AND [2] it was from \"angina\" AND [3] not relieved by nitroglycerin    Negative: Sounds like a life-threatening emergency to the triager    Negative: Followed a neck injury (e.g., MVA, sports, impact or collision)    Negative: Chest pain    Negative: Lymph node in the neck is swollen or painful to the touch    Negative: Sore throat is main symptom    Negative: Difficulty breathing or unusual sweating (e.g., sweating without exertion)    Negative: [1] Stiff neck (can't put chin to chest) AND [2] headache    Negative: [1] Stiff neck (can't put chin to chest) AND [2] fever    Negative: Weakness of an arm or hand    Negative: Problems with bowel or bladder control    Negative: Head is twisting to one side (or ask \"is it turning against your will?\")    Negative: Patient sounds very sick or weak to the triager    Answer Assessment - Initial Assessment Questions  1. ONSET: \"When did the pain begin?\"       4/3/22  2. LOCATION: \"Where does it hurt?\"       Neck pain  3. PATTERN \"Does the pain come and go, or has it been constant since it started?\"       constant  4. SEVERITY: \"How bad is the pain?\"  (Scale 1-10; or mild, moderate, severe)    - MILD (1-3): " "doesn't interfere with normal activities     - MODERATE (4-7): interferes with normal activities or awakens from sleep     - SEVERE (8-10):  excruciating pain, unable to do any normal activities       Moderate to severe pain \"I went to the chiropractor on Monday\"  5. RADIATION: \"Does the pain go anywhere else, shoot into your arms?\"      no  6. CORD SYMPTOMS: \"Any weakness or numbness of the arms or legs?\"      no  7. CAUSE: \"What do you think is causing the neck pain?\"      Possible related to coughing due to cold pt reports  8. NECK OVERUSE: \"Any recent activities that involved turning or twisting the neck?\"      no  9. OTHER SYMPTOMS: \"Do you have any other symptoms?\" (e.g., headache, fever, chest pain, difficulty breathing, neck swelling)      no  10. PREGNANCY: \"Is there any chance you are pregnant?\" \"When was your last menstrual period?\"        no    Protocols used: NECK PAIN OR [id number]      "

## 2022-05-28 DIAGNOSIS — F43.23 ADJUSTMENT DISORDER WITH MIXED ANXIETY AND DEPRESSED MOOD: ICD-10-CM

## 2022-05-31 RX ORDER — CITALOPRAM HYDROBROMIDE 10 MG/1
TABLET ORAL
Qty: 90 TABLET | Refills: 3 | Status: SHIPPED | OUTPATIENT
Start: 2022-05-31 | End: 2023-02-21

## 2022-05-31 NOTE — TELEPHONE ENCOUNTER
Celexa      Last Written Prescription Date:  5.25.2021  Last Fill Quantity: 90,   # refills: 3  Last Office Visit: 4.6.2022  Future Office visit:       Routing refill request to provider for review/approval because:     SSRIs Protocol Failed 05/28/2022 12:25 PM   Protocol Details  PHQ-9 score less than 5 in past 6 months        Gabriella Waldrop RN

## 2022-06-28 ENCOUNTER — OFFICE VISIT (OUTPATIENT)
Dept: CHIROPRACTIC MEDICINE | Facility: OTHER | Age: 67
End: 2022-06-28
Attending: CHIROPRACTOR
Payer: COMMERCIAL

## 2022-06-28 DIAGNOSIS — M54.50 ACUTE RIGHT-SIDED LOW BACK PAIN WITHOUT SCIATICA: ICD-10-CM

## 2022-06-28 DIAGNOSIS — M99.03 SEGMENTAL AND SOMATIC DYSFUNCTION OF LUMBAR REGION: Primary | ICD-10-CM

## 2022-06-28 DIAGNOSIS — M99.02 SEGMENTAL AND SOMATIC DYSFUNCTION OF THORACIC REGION: ICD-10-CM

## 2022-06-28 PROCEDURE — 98941 CHIROPRACT MANJ 3-4 REGIONS: CPT | Mod: AT | Performed by: CHIROPRACTOR

## 2022-07-15 DIAGNOSIS — E78.5 DYSLIPIDEMIA: ICD-10-CM

## 2022-07-18 RX ORDER — ATORVASTATIN CALCIUM 10 MG/1
TABLET, FILM COATED ORAL
Qty: 90 TABLET | Refills: 1 | Status: SHIPPED | OUTPATIENT
Start: 2022-07-18 | End: 2023-01-20

## 2022-09-04 ENCOUNTER — HEALTH MAINTENANCE LETTER (OUTPATIENT)
Age: 67
End: 2022-09-04

## 2022-09-29 DIAGNOSIS — Z12.31 VISIT FOR SCREENING MAMMOGRAM: Primary | ICD-10-CM

## 2022-10-18 ENCOUNTER — TRANSFERRED RECORDS (OUTPATIENT)
Dept: HEALTH INFORMATION MANAGEMENT | Facility: CLINIC | Age: 67
End: 2022-10-18

## 2022-10-20 ENCOUNTER — TELEPHONE (OUTPATIENT)
Dept: FAMILY MEDICINE | Facility: OTHER | Age: 67
End: 2022-10-20

## 2022-10-20 NOTE — TELEPHONE ENCOUNTER
Next 5 appointments (look out 90 days)      Nov 08, 2022  1:15 PM  (Arrive by 1:00 PM)  Pre-Op physical with Robert Blakely MD  [facility] ([facility] [place] ) [address]  [place] [address]  [phone]

## 2022-10-20 NOTE — TELEPHONE ENCOUNTER
10:24 AM    Reason for Call: OVERBOOK    Patient is needing a preop / [facility] / 11-15-22/ R total knee replacement / Dr. Dubose    The patient is requesting an appointment for before surgery with Dr. Blakely.    Was an appointment offered for this call? No  If yes : Appointment type              Date    Preferred method for responding to this message: Telephone Call  What is your phone number ? [phone]    If we cannot reach you directly, may we leave a detailed response at the number you provided? Yes    Can this message wait until your PCP/provider returns, if unavailable today? Elayne Talbot

## 2022-10-31 ENCOUNTER — NURSE TRIAGE (OUTPATIENT)
Dept: OBGYN | Facility: OTHER | Age: 67
End: 2022-10-31

## 2022-10-31 ENCOUNTER — TELEPHONE (OUTPATIENT)
Dept: OBGYN | Facility: OTHER | Age: 67
End: 2022-10-31

## 2022-10-31 ENCOUNTER — OFFICE VISIT (OUTPATIENT)
Dept: OBGYN | Facility: OTHER | Age: 67
End: 2022-10-31
Attending: OBSTETRICS & GYNECOLOGY
Payer: COMMERCIAL

## 2022-10-31 ENCOUNTER — HOSPITAL ENCOUNTER (OUTPATIENT)
Dept: PHYSICAL THERAPY | Facility: HOSPITAL | Age: 67
Setting detail: THERAPIES SERIES
Discharge: HOME | End: 2022-10-31
Attending: ORTHOPAEDIC SURGERY
Payer: COMMERCIAL

## 2022-10-31 VITALS
SYSTOLIC BLOOD PRESSURE: 128 MMHG | BODY MASS INDEX: 28.93 KG/M2 | RESPIRATION RATE: 16 BRPM | HEART RATE: 64 BPM | WEIGHT: 162 LBS | TEMPERATURE: 98.4 F | DIASTOLIC BLOOD PRESSURE: 82 MMHG

## 2022-10-31 DIAGNOSIS — L90.0 LICHEN SCLEROSUS: Primary | ICD-10-CM

## 2022-10-31 PROBLEM — Z87.19 H/O DIVERTICULITIS OF COLON: Status: RESOLVED | Noted: 2018-10-23 | Resolved: 2022-10-31

## 2022-10-31 PROCEDURE — G0463 HOSPITAL OUTPT CLINIC VISIT: HCPCS | Mod: 25

## 2022-10-31 PROCEDURE — 999N000104 HC STATISTIC NO CHARGE

## 2022-10-31 PROCEDURE — 88305 TISSUE EXAM BY PATHOLOGIST: CPT | Mod: TC | Performed by: OBSTETRICS & GYNECOLOGY

## 2022-10-31 PROCEDURE — 56605 BIOPSY OF VULVA/PERINEUM: CPT | Performed by: OBSTETRICS & GYNECOLOGY

## 2022-10-31 PROCEDURE — 99202 OFFICE O/P NEW SF 15 MIN: CPT | Mod: 25 | Performed by: OBSTETRICS & GYNECOLOGY

## 2022-10-31 RX ORDER — CLOBETASOL PROPIONATE 0.5 MG/G
OINTMENT TOPICAL AT BEDTIME
Qty: 60 G | Refills: 4 | Status: SHIPPED | OUTPATIENT
Start: 2022-10-31 | End: 2023-03-07

## 2022-10-31 ASSESSMENT — ACTIVITIES OF DAILY LIVING (ADL)
HOW_WOULD_YOU_RATE_THE_OVERALL_FUNCTION_OF_YOUR_KNEE_DURING_YOUR_USUAL_DAILY_ACTIVITIES?: NEARLY NORMAL
LIMPING: I DO NOT HAVE THE SYMPTOM
KNEE_ACTIVITY_OF_DAILY_LIVING_SCORE: 68.57
KNEE_ACTIVITY_OF_DAILY_LIVING_SUM: 48
RAW_SCORE: 48
SWELLING: I HAVE THE SYMPTOM BUT IT DOES NOT AFFECT MY ACTIVITY
STIFFNESS: I DO NOT HAVE THE SYMPTOM
SIT WITH YOUR KNEE BENT: ACTIVITY IS NOT DIFFICULT
GO UP STAIRS: ACTIVITY IS MINIMALLY DIFFICULT
AS_A_RESULT_OF_YOUR_KNEE_INJURY,_HOW_WOULD_YOU_RATE_YOUR_CURRENT_LEVEL_OF_DAILY_ACTIVITY?: NEARLY NORMAL
RISE FROM A CHAIR: ACTIVITY IS MINIMALLY DIFFICULT
GIVING WAY, BUCKLING OR SHIFTING OF KNEE: THE SYMPTOM AFFECTS MY ACTIVITY MODERATELY
SQUAT: I AM UNABLE TO DO THE ACTIVITY
KNEEL ON THE FRONT OF YOUR KNEE: I AM UNABLE TO DO THE ACTIVITY
PAIN: THE SYMPTOM AFFECTS MY ACTIVITY SLIGHTLY
WEAKNESS: THE SYMPTOM AFFECTS MY ACTIVITY SLIGHTLY
STAND: ACTIVITY IS NOT DIFFICULT
GO DOWN STAIRS: ACTIVITY IS SOMEWHAT DIFFICULT
HOW_WOULD_YOU_RATE_THE_CURRENT_FUNCTION_OF_YOUR_KNEE_DURING_YOUR_USUAL_DAILY_ACTIVITIES_ON_A_SCALE_FROM_0_TO_100_WITH_100_BEING_YOUR_LEVEL_OF_KNEE_FUNCTION_PRIOR_TO_YOUR_INJURY_AND_0_BEING_THE_INABILITY_TO_PERFORM_ANY_OF_YOUR_USUAL_DAILY_ACTIVITIES?: 50
WALK: ACTIVITY IS NOT DIFFICULT

## 2022-10-31 ASSESSMENT — PAIN SCALES - GENERAL: PAINLEVEL: NO PAIN (0)

## 2022-10-31 NOTE — TELEPHONE ENCOUNTER
Patient left a message stating she needs to get in to see someone as soon as possible. She has a skin condition and needs to be seen. She did say Laura Urias but I know Dr. Robledo is here and might be able to get her in today. She would like a nurse to call her back.,    Thank you      [phone]      Saadia Rosario

## 2022-10-31 NOTE — PROGRESS NOTES
Total Joint Replacement Surgery Pre-Operative Education and Training      Name: Katelyn Manley MRN# [id number]   Age: 67 year old YOB: 1955     Date of Consultation: October 31, 2022  Primary care provider: Robert Blakely    Referring Physician:  Dr. Dubose  Orders: Eval and Treat  Medical Diagnosis: Status post revision of total knee, right [Z96.651] (Surgery date 11/15/2022)  Onset of Illness/Injury: 10/31/2022   Surgery Type: right TKA    HEP Education: Instruction in appropriate pre and post-op exercises based surgery type Completed  Edema Education: Expectations reviewed,monitoring and elevation): Completed  Gait Training (Walker/Crutches): Completed  Pain Education: Completed  Home Environment Education: Home Safety Checklist reviewed Completed   Current Limitations (ROM and Strength): R knee flexion 125 degrees (versus 135 left); R knee extension lacking 5 degrees (versus 0 degrees on left)  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 for post operative rehabilitation following surgery in [facility] location. Patient will perform prehab exercise program listed below prior to surgery.    Access Code: [email or web address]  URL: [email or web address]  Date: 10/31/2022  Prepared by: Monty West    Exercises  Supine Knee Extension Stretch on Towel Roll - 2 x daily - 6 x weekly - 3 sets - 10 reps  Supine Heel Slide with Strap - 2 x daily - 6 x weekly - 3 sets - 10 reps  Supine Short Arc Quad - 2 x daily - 6 x weekly - 3 sets - 10 reps  Hamstring Stretch with Strap - 2 x daily - 6 x weekly - 3 sets - 10 reps  Long Arc Quad - 2 x daily - 6 x weekly - 3 sets - 10 reps  Mini Squat - 2 x daily - 6 x weekly - 3 sets - 10 reps        Visit Time: 45mins  Date Competed: 10/31/2022

## 2022-10-31 NOTE — PATIENT INSTRUCTIONS
I recommend a trial of clobetasol ointment 0.05% to be applied to the area nightly for 4 weeks, then every other night for 4 weeks, then 2-3 times per week x 4 to 8 weeks   IUP; cholestasis of pregnancy,  x1, hypothyroid, PMH hypothyroid, no significant back/bleeding issues.

## 2022-10-31 NOTE — TELEPHONE ENCOUNTER
"Patient has a hx of lichen sclerosus. Patient reports vaginal itching and reports that when she looks down there she noticed a dark spot on her right labia. Patient is coming in this afternoon to see. Dr. Robledo. OB/GYN    Reason for Disposition    [1] Skin growth or mole AND [2] new    Additional Information    Negative: [1] Looks infected AND [2] large red area (> 2 in. or 5 cm)    Negative: [1] Fever AND [2] bump is tender to touch    Negative: [1] Fever AND [2] spreading red area or streak    Negative: [1] Looks infected (spreading redness, pus) AND [2] no fever    Negative: Looks like a boil, infected sore, or deep ulcer    Negative: Caller can't describe it clearly    Negative: Scar that is growing larger    Negative: Flat waxy-yellow patch near eyelids    Negative: Caller is uncertain what lesion is    Negative: [1] Skin growth or mole AND [2] bleeds or itches or is painful    Negative: [1] Skin growth or mole AND [2] sticks up out of the skin (elevated) AND [3] feels rough to the touch    Negative: [1] Skin growth or mole AND [2] larger than a pencil eraser, or increasing in size    Negative: [1] Skin growth or mole AND [2] border is irregular or blurry    Negative: [1] Skin growth or mole AND [2] changes color, or has more than one color    Negative: [1] Skin growth or mole AND [2] two sides do not look the same (i.e., asymmetric)    Answer Assessment - Initial Assessment Questions  1. APPEARANCE of LESION: \"What does it look like?\"       Patient reports that is a dark spot on her right labia.  2. SIZE: \"How big is it?\" (e.g., inches, cm; or compare to size of pinhead, tip of pen, eraser, coin, pea, grape, ping pong ball)       unknown  3. COLOR: \"What color is it?\" \"Is there more than one color?\"      dark  4. SHAPE: \"What shape is it?\" (e.g., round, irregular)      unknown  5. RAISED: \"Does it stick up above the skin or is it flat?\" (e.g., raised or elevated)      unknown  6. TENDER: \"Does it hurt when you " "touch it?\"  (Scale 1-10; or mild, moderate, severe)      no  7. LOCATION: \"Where is it located?\"       On right labia  8. ONSET: \"When did it first appear?\"       The other day   9. NUMBER: \"Is there just one?\" or \"Are there others?\"      One that she saw  10. CAUSE: \"What do you think it is?\"        unknown  11. OTHER SYMPTOMS: \"Do you have any other symptoms?\" (e.g., fever)        itching  12. PREGNANCY: \"Is there any chance you are pregnant?\" \"When was your last menstrual period?\"        Unknown.    Protocols used: SKIN LESION - MOLES OR GROWTHS-A-AH      "

## 2022-10-31 NOTE — NURSING NOTE
"Chief Complaint   Patient presents with     Vaginal Problem     Vaginal itching/hx of Lichen       Initial /82   Pulse 64   Wt 73.5 kg (162 lb)   BMI 28.93 kg/m   Estimated body mass index is 28.93 kg/m  as calculated from the following:    Height as of 11/23/21: 1.594 m (5' 2.75\").    Weight as of this encounter: 73.5 kg (162 lb).  Medication Reconciliation: complete  Ernestina Pedro LPN    "

## 2022-10-31 NOTE — PROGRESS NOTES
CHIEF COMPLAINT / REASON FOR VISIT  Patient presents for Gynecology visit for vulvar itching and irritation.    HISTORY OF PRESENT ILLNESS  Katelyn Manley is a 67 year old  with No LMP recorded. Patient is postmenopausal. who presents for vulvar itching and irritation.  The patient was diagnosed with lichen sclerosis in  and reports her symptoms resolved and she stopped the steroid ointment several years ago.  About 6 months ago she had return of vulvar itching and irritation and she noticed that her vulva was white appearance.  At times she will have a burning sensation in the vulva.  She started using Mycolog ointment (nystatin and triamcinolone) with minimal improvement in her vulvar itching and irritation.  She denies difficulty urinating, dysuria, hematuria, or flank pain.  No fever or chills.  No vaginal discharge, bleeding, or malodor.    MENSTRUAL HISTORY  Postmenopausal: Yes.  Menopause was at age 49.  She denies bleeding since menopause.  She is sexually active and denies issues with intercourse.   Dyspareunia: Denies.  Postcoital spotting: Denies.  Current contraception: Hysterectomy, postmenopausal.  STD History: No STD history.  Last Pap smear history: Normal.  Mammogram history: Normal.    ALLERGIES     Allergies   Allergen Reactions     Cats      Cat/feline product derivatives     Dogs      Horse-Derived Products      And cows       MEDICATIONS    Current Outpatient Medications:      albuterol (PROAIR HFA/PROVENTIL HFA/VENTOLIN HFA) 108 (90 Base) MCG/ACT inhaler, Inhale 2 puffs into the lungs every 6 hours as needed for shortness of breath / dyspnea or wheezing, Disp: 1 Inhaler, Rfl: 1     atorvastatin (LIPITOR) 10 MG tablet, TAKE 1 TABLET BY MOUTH DAILY, Disp: 90 tablet, Rfl: 1     citalopram (CELEXA) 10 MG tablet, TAKE 1 TABLET BY MOUTH DAILY, Disp: 90 tablet, Rfl: 3     clobetasol (TEMOVATE) 0.05 % external ointment, Apply topically At Bedtime, Disp: 60 g, Rfl: 4     GLUCOSAMINE SULFATE  PO, Take 2,000 mg by mouth daily, Disp: , Rfl:      Multiple Vitamins-Minerals (MULTIVITAMIN OR), Take 1 capsule by mouth daily., Disp: , Rfl:      Psyllium (METAMUCIL PO), , Disp: , Rfl:      Vitamin D, Cholecalciferol, 1000 units CAPS, Take 2 tablets by mouth daily, Disp: , Rfl:      aspirin 81 MG chewable tablet, Take 1 tablet (81 mg) by mouth daily (Patient not taking: Reported on 10/31/2022), Disp: 108 tablet, Rfl: 3    REVIEW OF SYSTEMS  As per HPI, otherwise negative    The patient's Medical Hx, Surgical Hx, Obstetrical Hx, Social Hx, and Family Hx have been reviewed and updated in the electronic medical record.    OBJECTIVE  /82   Pulse 64   Temp 98.4  F (36.9  C)   Resp 16   Wt 73.5 kg (162 lb)   BMI 28.93 kg/m      General:  Well-developed, well-nourished female in no apparent distress.  Neurological: Alert and oriented x3.  Lungs:  Clear to auscultation bilaterally with good inspiratory effort.  No wheezing rhonchi or rales noted. Breathing nonlabored.  Heart:  Regular rate and rhythm without murmur. No JVD.  No peripheral vascular disease.  Abdomen: Soft, nontender, nondistended, positive bowel sounds.  No organomegaly. No rebound, no guarding.  Pelvic exam: Atrophic external female genitalia with thin, white, parchment type paper appearance to labia majora and labia minora.  There is agglutination and thinning of the tissue with loss of architecture. No ulcerations, no abnormal vasculature.  Normal pubic hair distribution. Urethral meatus normal in appearance and without masses. Normal Bartholin, Urethral and Poston's glands. Vaginal mucosa is atrophic with a small amount of physiologic discharge. No vaginal lesions.  No cystocele or rectocele.  Cervix and uterus are absent. The bladder and urethra are nontender to palpation.  No adnexal masses or tenderness bilaterally.  Rectal exam:  No external hemorrhoids noted. No rectovaginal nodularity noted. Examination confirms the vaginal  exam.  Extremities:  No clubbing cyanosis or edema. Nontender bilaterally.     Chaperone: Ernestina Pedro N    DIAGNOSTICS  10/31/2022 Vulva biopsy pending    PROCEDURE  Vulvar biopsy is performed.  Please see separate note for details.    ASSESSMENT / PLAN  Katelyn Manley is a 67 year old  female who presents for vulvar itching and irritation.    1 Lichen sclerosus    - I discussed lichen sclerosis with the patient. Lichen sclerosis is a disease that is characterized by changes to the skin usually involving the vulva and occasionally around the anus. It can cause structural skin changes including tissue fusion of the labia majora and minora, tightening and change in caliber of the vaginal introitus. It usually presents with significant vaginal pruritis and occasionally excoriations from scratching the itchy region. Lichen Sclerosis can progress into vaginal intraepithelial neoplasia (REY) precancerous lesions and even squamous cell carcinoma if left untreated and unmonitored. The mainstay of treatment is topical steroid ointment or cream.  - I discussed the option of labia/ vulvar biopsy to confirm the diagnosis.  - I reviewed the risks, benefits, alternatives, and indication of labial/vulvar biopsy.  Vulvar biopsy is performed as mentioned above.  Please see separate note for details.  - I will contact the patient with the results of her biopsy.  - I recommend a trial of clobetasol ointment 0.05% to be applied to the area nightly for 4 weeks, then every other night for 4 weeks, then 2-3 times per week x 4 to 8 weeks.  I reviewed the expected outcome, risk, benefits, alternatives, and indication clobetasol ointment with the patient.  Topical corticosteroids are very effective at prohibiting progression of lichen sclerosis in most patients. The patient verbalizes understanding desires to proceed.  - I recommend the patient stopped using Mycolog ointment.  - Symptom improvement should occur by 4-6 weeks. If no  improvement of symptoms occurs, then a biopsy is warranted to ensure it is not another dermatologic change disguised as lichen sclerosis.  - Maintenance therapy will be needed throughout life. Maintenance is recommended to prevent progression of disease and any REY or SCC changes.  - After a 12 week course of clobetasol ointment then I recommend maintenance therapy with Mometasone Furoate ointment 0.1% twice weekly.  - I recommend follow up needed every 12 months once we reach to maintenance therapy stage.   - I discussed perineal hygiene recommendations and recommend cotton underwear.  - I recommend the patient avoid scratching her labia and perineum.  - I encouraged patient to use water based lubrication with every instance of intercourse.  - If the patient's symptoms have not improved in 10-12 weeks then I recommend reevaluation.  - I discussed the increased risk of malignancy in women with lichen sclerosis.  I discussed the need of self-examination for signs of malignancy and annual gynecological examination.    - Problem list reviewed and updated.  - Follow up in 8 weeks to assess for improvement or sooner as needed.  - After a 12 week course of clobetasol ointment then I recommend maintenance therapy with Mometasone Furoate ointment 0.1% twice weekly.    Arcenio Robledo MD  Obstetrics and Gynecology

## 2022-10-31 NOTE — PROCEDURES
GYN Procedure Note     PROCEDURE PERFORMED  Vulvar Biopsy    INDICATION  1 Lichen sclerosis.    OBJECTIVE  /82   Pulse 64   Temp 98.4  F (36.9  C)   Resp 16   Wt 73.5 kg (162 lb)   BMI 28.93 kg/m      Please see separate note for details of physical examination.    PROCEDURE  I reviewed the risks, benefits, alternatives, indication and expected outcome of vulvar biopsy with the patient. The patient verbalized understanding and desired to proceed. Consent form was signed.  After informed consent was obtained from the patient, the patient was prepped and draped in the usual fashion. The area was swabbed with betadine prep and infiltrated with 2 ml of 1% lidocaine for anesthesia. A 4 mm punch biopsy was used to biopsy the lesion. A 4-0 Vicryl suture was used to close the biopsy site. The area was hemostatic. The area was bandaged in the usual fashion. All instruments were removed. There were no complications. EBL was minimal. The patient tolerated the procedure well. The specimen was sent to pathology.    Chaperone: Ernestina Robledo MD  Obstetrics and Gynecology

## 2022-11-03 LAB
PATH REPORT.COMMENTS IMP SPEC: NORMAL
PATH REPORT.FINAL DX SPEC: NORMAL
PATH REPORT.GROSS SPEC: NORMAL
PATH REPORT.MICROSCOPIC SPEC OTHER STN: NORMAL
PATH REPORT.MICROSCOPIC SPEC OTHER STN: NORMAL
PATH REPORT.RELEVANT HX SPEC: NORMAL
PHOTO IMAGE: NORMAL

## 2022-11-03 PROCEDURE — 88312 SPECIAL STAINS GROUP 1: CPT | Mod: 26 | Performed by: PATHOLOGY

## 2022-11-03 PROCEDURE — 88305 TISSUE EXAM BY PATHOLOGIST: CPT | Mod: 26 | Performed by: PATHOLOGY

## 2022-11-08 ENCOUNTER — LAB (OUTPATIENT)
Dept: LAB | Facility: OTHER | Age: 67
End: 2022-11-08
Payer: COMMERCIAL

## 2022-11-08 ENCOUNTER — OFFICE VISIT (OUTPATIENT)
Dept: FAMILY MEDICINE | Facility: OTHER | Age: 67
End: 2022-11-08
Attending: FAMILY MEDICINE
Payer: COMMERCIAL

## 2022-11-08 VITALS
SYSTOLIC BLOOD PRESSURE: 116 MMHG | RESPIRATION RATE: 18 BRPM | TEMPERATURE: 97.8 F | HEART RATE: 76 BPM | BODY MASS INDEX: 29.28 KG/M2 | OXYGEN SATURATION: 98 % | DIASTOLIC BLOOD PRESSURE: 80 MMHG | WEIGHT: 164 LBS

## 2022-11-08 DIAGNOSIS — M17.11 PRIMARY OSTEOARTHRITIS OF RIGHT KNEE: ICD-10-CM

## 2022-11-08 DIAGNOSIS — Z01.810 PRE-OPERATIVE CARDIOVASCULAR EXAMINATION: ICD-10-CM

## 2022-11-08 DIAGNOSIS — J98.9 REACTIVE AIRWAY DISEASE WITHOUT ASTHMA: ICD-10-CM

## 2022-11-08 DIAGNOSIS — R73.03 PRE-DIABETES: ICD-10-CM

## 2022-11-08 DIAGNOSIS — E78.5 DYSLIPIDEMIA: ICD-10-CM

## 2022-11-08 DIAGNOSIS — F43.23 ADJUSTMENT DISORDER WITH MIXED ANXIETY AND DEPRESSED MOOD: ICD-10-CM

## 2022-11-08 DIAGNOSIS — N18.31 CHRONIC KIDNEY DISEASE, STAGE 3A (H): ICD-10-CM

## 2022-11-08 DIAGNOSIS — Z01.810 PRE-OPERATIVE CARDIOVASCULAR EXAMINATION: Primary | ICD-10-CM

## 2022-11-08 LAB
ANION GAP SERPL CALCULATED.3IONS-SCNC: 10 MMOL/L (ref 7–15)
BASOPHILS # BLD AUTO: 0.1 10E3/UL (ref 0–0.2)
BASOPHILS NFR BLD AUTO: 1 %
BUN SERPL-MCNC: 21.8 MG/DL (ref 8–23)
CALCIUM SERPL-MCNC: 8.9 MG/DL (ref 8.8–10.2)
CHLORIDE SERPL-SCNC: 104 MMOL/L (ref 98–107)
CREAT SERPL-MCNC: 1.11 MG/DL (ref 0.51–0.95)
CREAT UR-MCNC: 75 MG/DL
DEPRECATED HCO3 PLAS-SCNC: 24 MMOL/L (ref 22–29)
EOSINOPHIL # BLD AUTO: 0.3 10E3/UL (ref 0–0.7)
EOSINOPHIL NFR BLD AUTO: 4 %
ERYTHROCYTE [DISTWIDTH] IN BLOOD BY AUTOMATED COUNT: 13.2 % (ref 10–15)
EST. AVERAGE GLUCOSE BLD GHB EST-MCNC: 120 MG/DL
GFR SERPL CREATININE-BSD FRML MDRD: 54 ML/MIN/1.73M2
GLUCOSE SERPL-MCNC: 114 MG/DL (ref 70–99)
HBA1C MFR BLD: 5.8 %
HCT VFR BLD AUTO: 38.6 % (ref 35–47)
HGB BLD-MCNC: 13 G/DL (ref 11.7–15.7)
IMM GRANULOCYTES # BLD: 0 10E3/UL
IMM GRANULOCYTES NFR BLD: 0 %
LYMPHOCYTES # BLD AUTO: 4.1 10E3/UL (ref 0.8–5.3)
LYMPHOCYTES NFR BLD AUTO: 46 %
MCH RBC QN AUTO: 28.6 PG (ref 26.5–33)
MCHC RBC AUTO-ENTMCNC: 33.7 G/DL (ref 31.5–36.5)
MCV RBC AUTO: 85 FL (ref 78–100)
MICROALBUMIN UR-MCNC: <12 MG/L
MICROALBUMIN/CREAT UR: NORMAL MG/G{CREAT}
MONOCYTES # BLD AUTO: 0.5 10E3/UL (ref 0–1.3)
MONOCYTES NFR BLD AUTO: 6 %
NEUTROPHILS # BLD AUTO: 3.8 10E3/UL (ref 1.6–8.3)
NEUTROPHILS NFR BLD AUTO: 43 %
NRBC # BLD AUTO: 0 10E3/UL
NRBC BLD AUTO-RTO: 0 /100
PLATELET # BLD AUTO: 294 10E3/UL (ref 150–450)
POTASSIUM SERPL-SCNC: 4.4 MMOL/L (ref 3.4–5.3)
RBC # BLD AUTO: 4.54 10E6/UL (ref 3.8–5.2)
SODIUM SERPL-SCNC: 138 MMOL/L (ref 136–145)
WBC # BLD AUTO: 8.9 10E3/UL (ref 4–11)

## 2022-11-08 PROCEDURE — 83036 HEMOGLOBIN GLYCOSYLATED A1C: CPT | Mod: ZL

## 2022-11-08 PROCEDURE — G0463 HOSPITAL OUTPT CLINIC VISIT: HCPCS

## 2022-11-08 PROCEDURE — 80048 BASIC METABOLIC PNL TOTAL CA: CPT | Mod: ZL

## 2022-11-08 PROCEDURE — 85014 HEMATOCRIT: CPT | Mod: ZL

## 2022-11-08 PROCEDURE — 36415 COLL VENOUS BLD VENIPUNCTURE: CPT | Mod: ZL

## 2022-11-08 PROCEDURE — 82043 UR ALBUMIN QUANTITATIVE: CPT | Mod: ZL

## 2022-11-08 PROCEDURE — 99214 OFFICE O/P EST MOD 30 MIN: CPT | Performed by: FAMILY MEDICINE

## 2022-11-08 RX ORDER — VIT C/B6/B5/MAGNESIUM/HERB 173 50-5-6-5MG
CAPSULE ORAL
COMMUNITY
Start: 2021-12-18 | End: 2023-05-31

## 2022-11-08 ASSESSMENT — PAIN SCALES - GENERAL: PAINLEVEL: NO PAIN (0)

## 2022-11-08 NOTE — NURSING NOTE
"Chief Complaint   Patient presents with     Pre-Op Exam       Initial /80 (BP Location: Left arm, Patient Position: Sitting, Cuff Size: Adult Regular)   Pulse 76   Temp 97.8  F (36.6  C) (Tympanic)   Resp 18   Wt 74.4 kg (164 lb)   SpO2 98%   BMI 29.28 kg/m   Estimated body mass index is 29.28 kg/m  as calculated from the following:    Height as of 11/23/21: 1.594 m (5' 2.75\").    Weight as of this encounter: 74.4 kg (164 lb).  Medication Reconciliation: complete  Leah Moody LPN  "

## 2022-11-08 NOTE — PROGRESS NOTES
covid  [facility] [place]  [address]  [place] [address]  Phone: [phone]  Primary Provider: Raudel Simon  Pre-op Performing Provider: RAUDEL SIMON      PREOPERATIVE EVALUATION:  Today's date: 11/8/2022    Katelyn Manley is a 67 year old female who presents for a preoperative evaluation.    Surgical Information:  Surgery/Procedure: right total knee  Surgery Location: [facility]  Surgeon: Dr. woodard  Surgery Date: 11/15/2022  Time of Surgery: TBD  Where patient plans to recover: At home with family  Fax number for surgical facility: [phone]    Type of Anesthesia Anticipated: to be determined    Assessment & Plan     The proposed surgical procedure is considered INTERMEDIATE risk.      ICD-10-CM    1. Pre-operative cardiovascular examination  Z01.810 CBC with Platelets & Differential     Basic metabolic panel     Hemoglobin A1c     EKG 12-lead complete w/read - Clinics      2. Primary osteoarthritis of right knee  M17.11 CBC with Platelets & Differential     Basic metabolic panel     Hemoglobin A1c     EKG 12-lead complete w/read - Clinics      3. Chronic kidney disease, stage 3a (H)  N18.31 CBC with Platelets & Differential     Basic metabolic panel     Hemoglobin A1c     EKG 12-lead complete w/read - Clinics     Albumin Random Urine Quantitative with Creat Ratio      4. Adjustment disorder with mixed anxiety and depressed mood  F43.23 CBC with Platelets & Differential     Basic metabolic panel     Hemoglobin A1c     EKG 12-lead complete w/read - Clinics      5. Pre-diabetes  R73.03 CBC with Platelets & Differential     Basic metabolic panel     Hemoglobin A1c     EKG 12-lead complete w/read - Clinics      6. Dyslipidemia  E78.5 CBC with Platelets & Differential     Basic metabolic panel     Hemoglobin A1c     EKG 12-lead complete w/read - Clinics      7. Reactive airway disease that is not asthma  J98.9 CBC with Platelets & Differential     Basic metabolic  panel     Hemoglobin A1c     EKG 12-lead complete w/read - Clinics                       Medication Instructions:  Patient is to take all scheduled medications on the day of surgery EXCEPT for modifications listed below:   ASA - hold for a week / hold all supplements for a week     RECOMMENDATION:  APPROVAL GIVEN to proceed with proposed procedure, without further diagnostic evaluation.        COVID test to be done stat on Monday the 14th              Subjective     HPI related to upcoming procedure:   68 yO female  presents for cardiopulmonary/general clearance to undergo the above procedure.  His surgeon listed above has asked for this clearance to undergo anesthesia. Pt has had this condition for approximately several months .   Overall pt is of good health and has not  had any perioperative complications with previous surgeries.        Preop Questions 11/1/2022   1. Have you ever had a heart attack or stroke? No   2. Have you ever had surgery on your heart or blood vessels, such as a stent placement, a coronary artery bypass, or surgery on an artery in your head, neck, heart, or legs? No   3. Do you have chest pain with activity? No   4. Do you have a history of  heart failure? No   5. Do you currently have a cold, bronchitis or symptoms of other infection? No   6. Do you have a cough, shortness of breath, or wheezing? No   7. Do you or anyone in your family have previous history of blood clots? No   8. Do you or does anyone in your family have a serious bleeding problem such as prolonged bleeding following surgeries or cuts? No   9. Have you ever had problems with anemia or been told to take iron pills? No   10. Have you had any abnormal blood loss such as black, tarry or bloody stools, or abnormal vaginal bleeding? No   11. Have you ever had a blood transfusion? No   12. Are you willing to have a blood transfusion if it is medically needed before, during, or after your surgery? Yes   13. Have you or any of  your relatives ever had problems with anesthesia? No   14. Do you have sleep apnea, excessive snoring or daytime drowsiness? No   15. Do you have any artifical heart valves or other implanted medical devices like a pacemaker, defibrillator, or continuous glucose monitor? No   16. Do you have artificial joints? YES - left knee   17. Are you allergic to latex? No       Health Care Directive:  Patient does not have a Health Care Directive or Living Will: Patient states has Advance Directive and will bring in a copy to clinic.    Preoperative Review of :   reviewed - no record of controlled substances prescribed.      Status of Chronic Conditions:  HYPERLIPIDEMIA - Patient has a long history of significant Hyperlipidemia requiring medication for treatment with recent good control. Patient reports no problems or side effects with the medication.     RENAL INSUFFICIENCY - Patient has a longstanding history of moderate-severe chronic renal insufficiency. Last Cr today .       Review of Systems  Constitutional, neuro, ENT, endocrine, pulmonary, cardiac, gastrointestinal, genitourinary, musculoskeletal, integument and psychiatric systems are negative, except as otherwise noted.    Patient Active Problem List    Diagnosis Date Noted     Chronic kidney disease, stage 3a (H) 11/23/2021     Priority: Medium     Adjustment disorder with mixed anxiety and depressed mood 10/26/2020     Priority: Medium     Diverticulosis of large intestine without hemorrhage 10/23/2018     Priority: Medium     Reactive airway disease that is not asthma 04/04/2018     Priority: Medium     Replacing diagnoses that were inactivated after the 10/1/2021 regulatory import.       Status post vaginal hysterectomy 09/12/2016     Priority: Medium     Bso,sling,post repair, [id number]       Dyslipidemia 05/15/2014     Priority: Medium     Lichen sclerosus 09/16/2013     Priority: Medium     Leukoplakia 07/03/2013     Priority: Medium     Pre-diabetes  05/06/2013     Priority: Medium      Past Medical History:   Diagnosis Date     Adjustment disorder with mixed anxiety and depressed mood 10/26/2020     Chronic kidney disease, stage 3a (H) 11/23/2021     Cyst of Bartholin's gland 10/22/2007     Diverticulosis of large intestine without hemorrhage 10/23/2018     Dyslipidemia 05/15/2014     Endometrial hyperplasia without atypia, simple      Glucose intolerance (impaired glucose tolerance) 790.22     Hypercholesterolemia 08/23/2011     Lichen sclerosus 09/16/2013     Menopause 05/06/2013     Osteoarthrosis, unspecified whether generalized or localized, lower leg 07/09/2002     Pre-diabetes 05/06/2013     Reactive airway disease that is not asthma 04/04/2018    Replacing diagnoses that were inactivated after the 10/1/2021 regulatory import.     Special screening for malignant neoplasms, colon 10/15/2004     Past Surgical History:   Procedure Laterality Date     ARTHROSCOPY KNEE Left     x 2     COLONOSCOPY  2004     COLONOSCOPY  02/03/2012    Dr Sanchez/ sigmoid diverticular dz     COLPORRHAPHY POSTERIOR N/A 02/18/2015    Procedure: COLPORRHAPHY POSTERIOR;  Surgeon: Vasquez Sauer MD;  Location: [facility]     CYSTOSCOPY N/A 02/18/2015    Procedure: CYSTOSCOPY;  Surgeon: Vasquez Sauer MD;  Location: [facility]     DILATION AND CURETTAGE, HYSTEROSCOPY DIAGNOSTIC, COMBINED  05/07/2014    Procedure: COMBINED DILATION AND CURETTAGE, HYSTEROSCOPY DIAGNOSTIC;  Surgeon: Vasquez Sauer MD;  Location: [facility]     EYE SURGERY Left 08/2016    macular hole repair     HYSTERECTOMY VAGINAL, BILATERAL SALPINGO-OOPHERECTOMY, COMBINED N/A 02/18/2015    Procedure: COMBINED HYSTERECTOMY VAGINAL, SALPINGO-OOPHORECTOMY;  Surgeon: Vasquez Sauer MD;  Location: [facility]     PHACOEMULSIFICATION WITH STANDARD INTRAOCULAR LENS IMPLANT Left 04/24/2018    Procedure: PHACOEMULSIFICATION WITH STANDARD INTRAOCULAR LENS IMPLANT;  PHACOEMULSIFICATION CATARACT EXTRACTION POSTERIOR CHAMBER LENS LEFT/TECNIS-TORIC, 10%  CORINA;  Surgeon: Jerome Snow MD;  Location: [facility]     REPLACEMENT TOTAL KNEE Left 2004     SLING TRANSOBTURATOR N/A 02/18/2015    Procedure: SLING TRANSOBTURATOR;  Surgeon: Vasquez Sauer MD;  Location: [facility]     Current Outpatient Medications   Medication Sig Dispense Refill     Turmeric (QC TUMERIC COMPLEX) 500 MG CAPS        albuterol (PROAIR HFA/PROVENTIL HFA/VENTOLIN HFA) 108 (90 Base) MCG/ACT inhaler Inhale 2 puffs into the lungs every 6 hours as needed for shortness of breath / dyspnea or wheezing 1 Inhaler 1     atorvastatin (LIPITOR) 10 MG tablet TAKE 1 TABLET BY MOUTH DAILY 90 tablet 1     citalopram (CELEXA) 10 MG tablet TAKE 1 TABLET BY MOUTH DAILY 90 tablet 3     clobetasol (TEMOVATE) 0.05 % external ointment Apply topically At Bedtime 60 g 4     GLUCOSAMINE SULFATE PO Take 2,000 mg by mouth daily       Multiple Vitamins-Minerals (MULTIVITAMIN OR) Take 1 capsule by mouth daily.       Psyllium (METAMUCIL PO)        Vitamin D, Cholecalciferol, 1000 units CAPS Take 2 tablets by mouth daily         Allergies   Allergen Reactions     Cats      Cat/feline product derivatives     Dogs      Horse-Derived Products      And cows        Social History     Tobacco Use     Smoking status: Never     Smokeless tobacco: Never   Substance Use Topics     Alcohol use: No     Alcohol/week: 0.0 standard drinks     Family History   Problem Relation Age of Onset     Other - See Comments Mother         [place] Granulometosis - cause of death     Coronary Artery Disease Father      Cerebrovascular Disease Father 77        CVA - cause of death     Coronary Artery Disease Brother      Other - See Comments Brother         multiple sclerosis     History   Drug Use No         Objective     /80 (BP Location: Left arm, Patient Position: Sitting, Cuff Size: Adult Regular)   Pulse 76   Temp 97.8  F (36.6  C) (Tympanic)   Resp 18   Wt 74.4 kg (164 lb)   SpO2 98%   BMI 29.28 kg/m      Physical Exam    GENERAL APPEARANCE:  healthy, alert and no distress     EYES: EOMI, PERRL     HENT: ear canals and TM's normal and nose and mouth without ulcers or lesions     NECK: no adenopathy, no asymmetry, masses, or scars and thyroid normal to palpation     RESP: lungs clear to auscultation - no rales, rhonchi or wheezes     CV: regular rates and rhythm, normal S1 S2, no S3 or S4 and no murmur, click or rub     ABDOMEN:  soft, nontender, no HSM or masses and bowel sounds normal     MS: extremities normal- no gross deformities noted, no evidence of inflammation in joints, FROM in all extremities.     SKIN: no suspicious lesions or rashes     NEURO: Normal strength and tone, sensory exam grossly normal, mentation intact and speech normal     PSYCH: mentation appears normal. and affect normal/bright     LYMPHATICS: No cervical adenopathy    Recent Labs   Lab Test 11/08/22  1246 11/11/21  0938 11/09/20  0934   HGB 13.0 13.5 13.4    301 285   NA  --  135 136   POTASSIUM  --  4.1 4.1   CR  --  1.04 1.10*        Diagnostics:  Recent Results (from the past 24 hour(s))   Basic metabolic panel    Collection Time: 11/08/22 12:46 PM   Result Value Ref Range    Sodium 138 136 - 145 mmol/L    Potassium 4.4 3.4 - 5.3 mmol/L    Chloride 104 98 - 107 mmol/L    Carbon Dioxide (CO2) 24 22 - 29 mmol/L    Anion Gap 10 7 - 15 mmol/L    Urea Nitrogen 21.8 8.0 - 23.0 mg/dL    Creatinine 1.11 (H) 0.51 - 0.95 mg/dL    Calcium 8.9 8.8 - 10.2 mg/dL    Glucose 114 (H) 70 - 99 mg/dL    GFR Estimate 54 (L) >60 mL/min/1.73m2   Hemoglobin A1c    Collection Time: 11/08/22 12:46 PM   Result Value Ref Range    Estimated Average Glucose 120 mg/dL    Hemoglobin A1C 5.8 (H) <5.7 %   Albumin Random Urine Quantitative with Creat Ratio    Collection Time: 11/08/22 12:46 PM   Result Value Ref Range    Albumin Urine mg/L <12.0 mg/L    Albumin Urine mg/g Cr      Creatinine Urine mg/dL 75.0 mg/dL   CBC with platelets and differential    Collection Time: 11/08/22 12:46 PM   Result  Value Ref Range    WBC Count 8.9 4.0 - 11.0 10e3/uL    RBC Count 4.54 3.80 - 5.20 10e6/uL    Hemoglobin 13.0 11.7 - 15.7 g/dL    Hematocrit 38.6 35.0 - 47.0 %    MCV 85 78 - 100 fL    MCH 28.6 26.5 - 33.0 pg    MCHC 33.7 31.5 - 36.5 g/dL    RDW 13.2 10.0 - 15.0 %    Platelet Count 294 150 - 450 10e3/uL    % Neutrophils 43 %    % Lymphocytes 46 %    % Monocytes 6 %    % Eosinophils 4 %    % Basophils 1 %    % Immature Granulocytes 0 %    NRBCs per 100 WBC 0 <1 /100    Absolute Neutrophils 3.8 1.6 - 8.3 10e3/uL    Absolute Lymphocytes 4.1 0.8 - 5.3 10e3/uL    Absolute Monocytes 0.5 0.0 - 1.3 10e3/uL    Absolute Eosinophils 0.3 0.0 - 0.7 10e3/uL    Absolute Basophils 0.1 0.0 - 0.2 10e3/uL    Absolute Immature Granulocytes 0.0 <=0.4 10e3/uL    Absolute NRBCs 0.0 10e3/uL      EKG: appears normal, NSR, normal axis, normal intervals, no acute ST/T changes c/w ischemia, no LVH by voltage criteria, nonspecific ST-T changes, unchanged from previous tracings    Revised Cardiac Risk Index (RCRI):  The patient has the following serious cardiovascular risks for perioperative complications:   - No serious cardiac risks = 0 points     RCRI Interpretation: 0 points: Class I (very low risk - 0.4% complication rate)           Signed Electronically by: Robert Blakely MD  Copy of this evaluation report is provided to requesting physician.

## 2022-11-09 ENCOUNTER — TELEPHONE (OUTPATIENT)
Dept: FAMILY MEDICINE | Facility: OTHER | Age: 67
End: 2022-11-09

## 2022-11-14 ENCOUNTER — ALLIED HEALTH/NURSE VISIT (OUTPATIENT)
Dept: FAMILY MEDICINE | Facility: OTHER | Age: 67
End: 2022-11-14
Attending: FAMILY MEDICINE
Payer: COMMERCIAL

## 2022-11-14 DIAGNOSIS — Z01.810 PRE-OPERATIVE CARDIOVASCULAR EXAMINATION: ICD-10-CM

## 2022-11-14 LAB — SARS-COV-2 RNA RESP QL NAA+PROBE: NEGATIVE

## 2022-11-14 PROCEDURE — U0003 INFECTIOUS AGENT DETECTION BY NUCLEIC ACID (DNA OR RNA); SEVERE ACUTE RESPIRATORY SYNDROME CORONAVIRUS 2 (SARS-COV-2) (CORONAVIRUS DISEASE [COVID-19]), AMPLIFIED PROBE TECHNIQUE, MAKING USE OF HIGH THROUGHPUT TECHNOLOGIES AS DESCRIBED BY CMS-2020-01-R: HCPCS | Mod: ZL

## 2022-12-29 ENCOUNTER — ANCILLARY PROCEDURE (OUTPATIENT)
Dept: MAMMOGRAPHY | Facility: OTHER | Age: 67
End: 2022-12-29
Attending: FAMILY MEDICINE
Payer: COMMERCIAL

## 2022-12-29 DIAGNOSIS — Z12.31 VISIT FOR SCREENING MAMMOGRAM: ICD-10-CM

## 2022-12-29 PROCEDURE — 77067 SCR MAMMO BI INCL CAD: CPT | Mod: TC

## 2023-01-15 ENCOUNTER — HEALTH MAINTENANCE LETTER (OUTPATIENT)
Age: 68
End: 2023-01-15

## 2023-01-20 ENCOUNTER — MYC MEDICAL ADVICE (OUTPATIENT)
Dept: FAMILY MEDICINE | Facility: OTHER | Age: 68
End: 2023-01-20

## 2023-02-09 ENCOUNTER — MEDICAL CORRESPONDENCE (OUTPATIENT)
Dept: HEALTH INFORMATION MANAGEMENT | Facility: HOSPITAL | Age: 68
End: 2023-02-09

## 2023-02-21 ENCOUNTER — MYC MEDICAL ADVICE (OUTPATIENT)
Dept: FAMILY MEDICINE | Facility: OTHER | Age: 68
End: 2023-02-21

## 2023-02-21 DIAGNOSIS — F43.23 ADJUSTMENT DISORDER WITH MIXED ANXIETY AND DEPRESSED MOOD: ICD-10-CM

## 2023-02-21 RX ORDER — CITALOPRAM HYDROBROMIDE 10 MG/1
10 TABLET ORAL DAILY
Qty: 90 TABLET | Refills: 3 | Status: SHIPPED | OUTPATIENT
Start: 2023-02-21 | End: 2023-03-16

## 2023-02-21 RX ORDER — CITALOPRAM HYDROBROMIDE 10 MG/1
10 TABLET ORAL DAILY
Qty: 90 TABLET | Refills: 3 | Status: SHIPPED | OUTPATIENT
Start: 2023-02-21 | End: 2023-02-21

## 2023-02-21 NOTE — TELEPHONE ENCOUNTER
celexa      Last Written Prescription Date:  5/31/2022  Last Fill Quantity: 90,   # refills: 3  Last Office Visit: 11/8/2022  Future Office visit:    Next 5 appointments (look out 90 days)    Mar 16, 2023  9:45 AM  (Arrive by 9:30 AM)  Pre-Op physical with Robert Blakely MD  [facility] [place] ([facility] [place] ) [address]  [place] [address]  [phone]   Mar 20, 2023 10:30 AM  (Arrive by 10:15 AM)  SHORT with Arcenio Robledo MD  [facility] [place] ([facility] [place] ) [address]  [place] [address]  [phone]           Routing refill request to provider for review/approval because:

## 2023-02-27 RX ORDER — CITALOPRAM HYDROBROMIDE 10 MG/1
TABLET ORAL
Qty: 90 TABLET | Refills: 3 | Status: SHIPPED | OUTPATIENT
Start: 2023-02-27 | End: 2023-03-20

## 2023-03-07 ENCOUNTER — MYC MEDICAL ADVICE (OUTPATIENT)
Dept: OBGYN | Facility: OTHER | Age: 68
End: 2023-03-07

## 2023-03-07 DIAGNOSIS — L90.0 LICHEN SCLEROSUS: ICD-10-CM

## 2023-03-07 RX ORDER — CLOBETASOL PROPIONATE 0.5 MG/G
OINTMENT TOPICAL AT BEDTIME
Qty: 60 G | Refills: 4 | Status: SHIPPED | OUTPATIENT
Start: 2023-03-07 | End: 2023-03-20

## 2023-03-16 ENCOUNTER — LAB (OUTPATIENT)
Dept: LAB | Facility: OTHER | Age: 68
End: 2023-03-16
Attending: FAMILY MEDICINE
Payer: COMMERCIAL

## 2023-03-16 ENCOUNTER — TELEPHONE (OUTPATIENT)
Dept: FAMILY MEDICINE | Facility: OTHER | Age: 68
End: 2023-03-16

## 2023-03-16 ENCOUNTER — OFFICE VISIT (OUTPATIENT)
Dept: FAMILY MEDICINE | Facility: OTHER | Age: 68
End: 2023-03-16
Attending: FAMILY MEDICINE
Payer: COMMERCIAL

## 2023-03-16 VITALS
RESPIRATION RATE: 16 BRPM | BODY MASS INDEX: 29.48 KG/M2 | HEIGHT: 63 IN | HEART RATE: 81 BPM | DIASTOLIC BLOOD PRESSURE: 88 MMHG | OXYGEN SATURATION: 97 % | WEIGHT: 166.4 LBS | TEMPERATURE: 98.5 F | SYSTOLIC BLOOD PRESSURE: 129 MMHG

## 2023-03-16 DIAGNOSIS — Z01.810 PRE-OPERATIVE CARDIOVASCULAR EXAMINATION: ICD-10-CM

## 2023-03-16 DIAGNOSIS — J98.9 REACTIVE AIRWAY DISEASE WITHOUT ASTHMA: ICD-10-CM

## 2023-03-16 DIAGNOSIS — R73.03 PRE-DIABETES: ICD-10-CM

## 2023-03-16 DIAGNOSIS — N18.31 CHRONIC KIDNEY DISEASE, STAGE 3A (H): ICD-10-CM

## 2023-03-16 DIAGNOSIS — Z01.810 PRE-OPERATIVE CARDIOVASCULAR EXAMINATION: Primary | ICD-10-CM

## 2023-03-16 DIAGNOSIS — M17.11 PRIMARY OSTEOARTHRITIS OF RIGHT KNEE: ICD-10-CM

## 2023-03-16 LAB
ALBUMIN UR-MCNC: NEGATIVE MG/DL
ANION GAP SERPL CALCULATED.3IONS-SCNC: 10 MMOL/L (ref 7–15)
APPEARANCE UR: CLEAR
BASOPHILS # BLD AUTO: 0.1 10E3/UL (ref 0–0.2)
BASOPHILS NFR BLD AUTO: 1 %
BILIRUB UR QL STRIP: NEGATIVE
BUN SERPL-MCNC: 21.3 MG/DL (ref 8–23)
CALCIUM SERPL-MCNC: 9.9 MG/DL (ref 8.8–10.2)
CHLORIDE SERPL-SCNC: 100 MMOL/L (ref 98–107)
COLOR UR AUTO: NORMAL
CREAT SERPL-MCNC: 1.12 MG/DL (ref 0.51–0.95)
DEPRECATED HCO3 PLAS-SCNC: 27 MMOL/L (ref 22–29)
EOSINOPHIL # BLD AUTO: 0.7 10E3/UL (ref 0–0.7)
EOSINOPHIL NFR BLD AUTO: 6 %
ERYTHROCYTE [DISTWIDTH] IN BLOOD BY AUTOMATED COUNT: 13.5 % (ref 10–15)
ERYTHROCYTE [SEDIMENTATION RATE] IN BLOOD BY WESTERGREN METHOD: 17 MM/HR (ref 0–30)
GFR SERPL CREATININE-BSD FRML MDRD: 53 ML/MIN/1.73M2
GLUCOSE SERPL-MCNC: 78 MG/DL (ref 70–99)
GLUCOSE UR STRIP-MCNC: NEGATIVE MG/DL
HCT VFR BLD AUTO: 39.8 % (ref 35–47)
HGB BLD-MCNC: 13.4 G/DL (ref 11.7–15.7)
HGB UR QL STRIP: NEGATIVE
IMM GRANULOCYTES # BLD: 0 10E3/UL
IMM GRANULOCYTES NFR BLD: 0 %
KETONES UR STRIP-MCNC: NEGATIVE MG/DL
LEUKOCYTE ESTERASE UR QL STRIP: NEGATIVE
LYMPHOCYTES # BLD AUTO: 3.2 10E3/UL (ref 0.8–5.3)
LYMPHOCYTES NFR BLD AUTO: 28 %
MCH RBC QN AUTO: 28.8 PG (ref 26.5–33)
MCHC RBC AUTO-ENTMCNC: 33.7 G/DL (ref 31.5–36.5)
MCV RBC AUTO: 85 FL (ref 78–100)
MONOCYTES # BLD AUTO: 1 10E3/UL (ref 0–1.3)
MONOCYTES NFR BLD AUTO: 8 %
NEUTROPHILS # BLD AUTO: 6.5 10E3/UL (ref 1.6–8.3)
NEUTROPHILS NFR BLD AUTO: 57 %
NITRATE UR QL: NEGATIVE
NRBC # BLD AUTO: 0 10E3/UL
NRBC BLD AUTO-RTO: 0 /100
PH UR STRIP: 5.5 [PH] (ref 4.7–8)
PLATELET # BLD AUTO: 261 10E3/UL (ref 150–450)
POTASSIUM SERPL-SCNC: 4.3 MMOL/L (ref 3.4–5.3)
RBC # BLD AUTO: 4.66 10E6/UL (ref 3.8–5.2)
SODIUM SERPL-SCNC: 137 MMOL/L (ref 136–145)
SP GR UR STRIP: 1 (ref 1–1.03)
UROBILINOGEN UR STRIP-MCNC: NORMAL MG/DL
WBC # BLD AUTO: 11.5 10E3/UL (ref 4–11)

## 2023-03-16 PROCEDURE — 85004 AUTOMATED DIFF WBC COUNT: CPT | Mod: ZL

## 2023-03-16 PROCEDURE — 85652 RBC SED RATE AUTOMATED: CPT | Mod: ZL

## 2023-03-16 PROCEDURE — 36415 COLL VENOUS BLD VENIPUNCTURE: CPT | Mod: ZL

## 2023-03-16 PROCEDURE — 80048 BASIC METABOLIC PNL TOTAL CA: CPT | Mod: ZL

## 2023-03-16 PROCEDURE — 81003 URINALYSIS AUTO W/O SCOPE: CPT | Mod: ZL

## 2023-03-16 PROCEDURE — 99213 OFFICE O/P EST LOW 20 MIN: CPT | Performed by: FAMILY MEDICINE

## 2023-03-16 PROCEDURE — G0463 HOSPITAL OUTPT CLINIC VISIT: HCPCS

## 2023-03-16 RX ORDER — ALBUTEROL SULFATE 90 UG/1
2 AEROSOL, METERED RESPIRATORY (INHALATION) EVERY 6 HOURS PRN
Qty: 18 G | Refills: 3 | Status: SHIPPED | OUTPATIENT
Start: 2023-03-16

## 2023-03-16 ASSESSMENT — PAIN SCALES - GENERAL: PAINLEVEL: NO PAIN (0)

## 2023-03-16 NOTE — PROGRESS NOTES
[facility] - [place]  [address] [address]  Phone: [phone]  Primary Provider: Raudel Simon  Pre-op Performing Provider: RAUDEL SIMON      PREOPERATIVE EVALUATION:  Today's date: 3/16/2023    Katelyn Manley is a 67 year old female who presents for a preoperative evaluation.    Surgical Information:  Surgery/Procedure: Right Total Knee  Surgery Location: [facility]   Surgeon: Dr. Berny Emery  Surgery Date: 3/28/2023  Time of Surgery: TBD  Where patient plans to recover: At home with family  Fax number for surgical facility:     Type of Anesthesia Anticipated: to be determined    Assessment & Plan     The proposed surgical procedure is considered INTERMEDIATE risk.      ICD-10-CM    1. Pre-operative cardiovascular examination  Z01.810 CBC with Platelets & Differential     Basic metabolic panel     EKG 12-lead complete w/read - Clinics     Erythrocyte sedimentation rate auto     Urinalysis Macroscopic      2. Primary osteoarthritis of right knee  M17.11 CBC with Platelets & Differential     Basic metabolic panel     EKG 12-lead complete w/read - Clinics     Erythrocyte sedimentation rate auto     Urinalysis Macroscopic      3. Reactive airway disease that is not asthma  J98.9 CBC with Platelets & Differential     Basic metabolic panel     EKG 12-lead complete w/read - Clinics     Erythrocyte sedimentation rate auto     Urinalysis Macroscopic     albuterol (PROAIR HFA/PROVENTIL HFA/VENTOLIN HFA) 108 (90 Base) MCG/ACT inhaler      4. Chronic kidney disease, stage 3a (H)  N18.31 CBC with Platelets & Differential     Basic metabolic panel     EKG 12-lead complete w/read - Clinics     Erythrocyte sedimentation rate auto     Urinalysis Macroscopic      5. Pre-diabetes  R73.03 CBC with Platelets & Differential     Basic metabolic panel     EKG 12-lead complete w/read - Clinics     Erythrocyte sedimentation rate auto     Urinalysis Macroscopic                        Medication Instructions:  Patient is to take all scheduled medications on the day of surgery EXCEPT for modifications listed below:  Hold all supplements and NSAIDS 10 days before surgery     RECOMMENDATION:  APPROVAL GIVEN to proceed with proposed procedure, without further diagnostic evaluation.            Subjective     HPI related to upcoming procedure:   66 yO female presents for cardiopulmonary/general clearance to undergo the above procedure.  His surgeon listed above has asked for this clearance to undergo anesthesia. Pt has had this condition for approximately a year - end stage right knee djd .   Overall pt is of good health and has not  had any perioperative complications with previous surgeries.        Preop Questions 3/9/2023   1. Have you ever had a heart attack or stroke? No   2. Have you ever had surgery on your heart or blood vessels, such as a stent placement, a coronary artery bypass, or surgery on an artery in your head, neck, heart, or legs? No   3. Do you have chest pain with activity? No   4. Do you have a history of  heart failure? No   5. Do you currently have a cold, bronchitis or symptoms of other infection? UNKNOWN - has a cold   6. Do you have a cough, shortness of breath, or wheezing? YES - mild nasal congestion /cough-- minimal - improving - no other sx.    7. Do you or anyone in your family have previous history of blood clots? No   8. Do you or does anyone in your family have a serious bleeding problem such as prolonged bleeding following surgeries or cuts? No   9. Have you ever had problems with anemia or been told to take iron pills? No   10. Have you had any abnormal blood loss such as black, tarry or bloody stools, or abnormal vaginal bleeding? No   11. Have you ever had a blood transfusion? No   12. Are you willing to have a blood transfusion if it is medically needed before, during, or after your surgery? Yes   13. Have you or any of your relatives ever had problems  with anesthesia? No   14. Do you have sleep apnea, excessive snoring or daytime drowsiness? No   15. Do you have any artifical heart valves or other implanted medical devices like a pacemaker, defibrillator, or continuous glucose monitor? No   16. Do you have artificial joints? YES - both knees   17. Are you allergic to latex? No       Health Care Directive:  Patient does not have a Health Care Directive or Living Will: Patient states has Advance Directive and will bring in a copy to clinic.    Preoperative Review of :   reviewed - no record of controlled substances prescribed.      Status of Chronic Conditions:  DEPRESSION - Patient has a long history of Depression of moderate severity requiring medication for control with recent symptoms being stable..Current symptoms of depression include none.     HYPERLIPIDEMIA - Patient has a long history of significant Hyperlipidemia requiring medication for treatment with recent good control. Patient reports no problems or side effects with the medication.       Review of Systems  Constitutional, neuro, ENT, endocrine, pulmonary, cardiac, gastrointestinal, genitourinary, musculoskeletal, integument and psychiatric systems are negative, except as otherwise noted.    Patient Active Problem List    Diagnosis Date Noted     Chronic kidney disease, stage 3a (H) 11/23/2021     Priority: Medium     Adjustment disorder with mixed anxiety and depressed mood 10/26/2020     Priority: Medium     Diverticulosis of large intestine without hemorrhage 10/23/2018     Priority: Medium     Reactive airway disease that is not asthma 04/04/2018     Priority: Medium     Replacing diagnoses that were inactivated after the 10/1/2021 regulatory import.       Status post vaginal hysterectomy 09/12/2016     Priority: Medium     Bso,sling,post repair, [id number]       Dyslipidemia 05/15/2014     Priority: Medium     Lichen sclerosus 09/16/2013     Priority: Medium     Leukoplakia 07/03/2013      Priority: Medium     Pre-diabetes 05/06/2013     Priority: Medium      Past Medical History:   Diagnosis Date     Adjustment disorder with mixed anxiety and depressed mood 10/26/2020     Chronic kidney disease, stage 3a (H) 11/23/2021     Cyst of Bartholin's gland 10/22/2007     Diverticulosis of large intestine without hemorrhage 10/23/2018     Dyslipidemia 05/15/2014     Endometrial hyperplasia without atypia, simple      Glucose intolerance (impaired glucose tolerance) 790.22     Hypercholesterolemia 08/23/2011     Lichen sclerosus 09/16/2013     Menopause 05/06/2013     Osteoarthrosis, unspecified whether generalized or localized, lower leg 07/09/2002     Pre-diabetes 05/06/2013     Reactive airway disease that is not asthma 04/04/2018    Replacing diagnoses that were inactivated after the 10/1/2021 regulatory import.     Special screening for malignant neoplasms, colon 10/15/2004     Past Surgical History:   Procedure Laterality Date     ARTHROSCOPY KNEE Left     x 2     COLONOSCOPY  2004     COLONOSCOPY  02/03/2012    Dr Sanchez/ sigmoid diverticular dz     COLPORRHAPHY POSTERIOR N/A 02/18/2015    Procedure: COLPORRHAPHY POSTERIOR;  Surgeon: Vasquez Sauer MD;  Location: [facility]     CYSTOSCOPY N/A 02/18/2015    Procedure: CYSTOSCOPY;  Surgeon: Vasquez Sauer MD;  Location: [facility]     DILATION AND CURETTAGE, HYSTEROSCOPY DIAGNOSTIC, COMBINED  05/07/2014    Procedure: COMBINED DILATION AND CURETTAGE, HYSTEROSCOPY DIAGNOSTIC;  Surgeon: Vasquez Sauer MD;  Location: [facility]     EYE SURGERY Left 08/2016    macular hole repair     HYSTERECTOMY VAGINAL, BILATERAL SALPINGO-OOPHERECTOMY, COMBINED N/A 02/18/2015    Procedure: COMBINED HYSTERECTOMY VAGINAL, SALPINGO-OOPHORECTOMY;  Surgeon: Vasquez Sauer MD;  Location: [facility]     PHACOEMULSIFICATION WITH STANDARD INTRAOCULAR LENS IMPLANT Left 04/24/2018    Procedure: PHACOEMULSIFICATION WITH STANDARD INTRAOCULAR LENS IMPLANT;  PHACOEMULSIFICATION CATARACT EXTRACTION POSTERIOR  CHAMBER LENS LEFT/TECNIS-TORIC, 10% CORINA;  Surgeon: Jerome Snow MD;  Location: [facility]     REPLACEMENT TOTAL KNEE Left 2004     SLING TRANSOBTURATOR N/A 02/18/2015    Procedure: SLING TRANSOBTURATOR;  Surgeon: Vasquez Sauer MD;  Location: [facility]     Current Outpatient Medications   Medication Sig Dispense Refill     albuterol (PROAIR HFA/PROVENTIL HFA/VENTOLIN HFA) 108 (90 Base) MCG/ACT inhaler Inhale 2 puffs into the lungs every 6 hours as needed for shortness of breath / dyspnea or wheezing 1 Inhaler 1     atorvastatin (LIPITOR) 10 MG tablet TAKE 1 TABLET BY MOUTH DAILY 90 tablet 3     citalopram (CELEXA) 10 MG tablet Take 1 tablet (10 mg) by mouth daily 90 tablet 3     clobetasol (TEMOVATE) 0.05 % external ointment Apply topically At Bedtime 60 g 4     GLUCOSAMINE SULFATE PO Take 2,000 mg by mouth daily       Multiple Vitamins-Minerals (MULTIVITAMIN OR) Take 1 capsule by mouth daily.       Psyllium (METAMUCIL PO)        Turmeric 500 MG CAPS        Vitamin D, Cholecalciferol, 1000 units CAPS Take 2 tablets by mouth daily       citalopram (CELEXA) 10 MG tablet TAKE 1 TABLET BY MOUTH DAILY 90 tablet 3       Allergies   Allergen Reactions     Cats      Cat/feline product derivatives     Dogs      Horse-Derived Products      And cows        Social History     Tobacco Use     Smoking status: Never     Smokeless tobacco: Never   Substance Use Topics     Alcohol use: No     Alcohol/week: 0.0 standard drinks     Family History   Problem Relation Age of Onset     Other - See Comments Mother         [place] Granulometosis - cause of death     Coronary Artery Disease Father      Cerebrovascular Disease Father 77        CVA - cause of death     Coronary Artery Disease Brother      Other - See Comments Brother         multiple sclerosis     History   Drug Use No         Objective     /88 (BP Location: Left arm, Patient Position: Sitting, Cuff Size: Adult Regular)   Pulse 81   Temp 98.5  F (36.9  C) (Temporal)    "Resp 16   Ht 1.6 m (5' 3\")   Wt 75.5 kg (166 lb 6.4 oz)   SpO2 97%   BMI 29.48 kg/m      Physical Exam    GENERAL APPEARANCE: healthy, alert and no distress     EYES: EOMI, PERRL     HENT: ear canals and TM's normal and nose and mouth without ulcers or lesions     NECK: no adenopathy, no asymmetry, masses, or scars and thyroid normal to palpation     RESP: lungs clear to auscultation - no rales, rhonchi or wheezes     CV: regular rates and rhythm, normal S1 S2, no S3 or S4 and no murmur, click or rub     ABDOMEN:  soft, nontender, no HSM or masses and bowel sounds normal     MS: extremities normal- no gross deformities noted, no evidence of inflammation in joints, FROM in all extremities.     SKIN: no suspicious lesions or rashes     NEURO: Normal strength and tone, sensory exam grossly normal, mentation intact and speech normal     PSYCH: mentation appears normal. and affect normal/bright     LYMPHATICS: No cervical adenopathy    Recent Labs   Lab Test 11/08/22  1246 11/11/21  0938   HGB 13.0 13.5    301    135   POTASSIUM 4.4 4.1   CR 1.11* 1.04   A1C 5.8*  --         Diagnostics:  Recent Results (from the past 24 hour(s))   Basic metabolic panel    Collection Time: 03/16/23  9:28 AM   Result Value Ref Range    Sodium 137 136 - 145 mmol/L    Potassium 4.3 3.4 - 5.3 mmol/L    Chloride 100 98 - 107 mmol/L    Carbon Dioxide (CO2) 27 22 - 29 mmol/L    Anion Gap 10 7 - 15 mmol/L    Urea Nitrogen 21.3 8.0 - 23.0 mg/dL    Creatinine 1.12 (H) 0.51 - 0.95 mg/dL    Calcium 9.9 8.8 - 10.2 mg/dL    Glucose 78 70 - 99 mg/dL    GFR Estimate 53 (L) >60 mL/min/1.73m2   Erythrocyte sedimentation rate auto    Collection Time: 03/16/23  9:28 AM   Result Value Ref Range    Erythrocyte Sedimentation Rate 17 0 - 30 mm/hr   Urinalysis Macroscopic    Collection Time: 03/16/23  9:28 AM   Result Value Ref Range    Color Urine Straw Colorless, Straw, Light Yellow, Yellow    Appearance Urine Clear Clear    Glucose Urine " Negative Negative mg/dL    Bilirubin Urine Negative Negative    Ketones Urine Negative Negative mg/dL    Specific Gravity Urine 1.004 1.003 - 1.035    Blood Urine Negative Negative    pH Urine 5.5 4.7 - 8.0    Protein Albumin Urine Negative Negative mg/dL    Urobilinogen Urine Normal Normal, 2.0 mg/dL    Nitrite Urine Negative Negative    Leukocyte Esterase Urine Negative Negative   CBC with platelets and differential    Collection Time: 03/16/23  9:28 AM   Result Value Ref Range    WBC Count 11.5 (H) 4.0 - 11.0 10e3/uL    RBC Count 4.66 3.80 - 5.20 10e6/uL    Hemoglobin 13.4 11.7 - 15.7 g/dL    Hematocrit 39.8 35.0 - 47.0 %    MCV 85 78 - 100 fL    MCH 28.8 26.5 - 33.0 pg    MCHC 33.7 31.5 - 36.5 g/dL    RDW 13.5 10.0 - 15.0 %    Platelet Count 261 150 - 450 10e3/uL    % Neutrophils 57 %    % Lymphocytes 28 %    % Monocytes 8 %    % Eosinophils 6 %    % Basophils 1 %    % Immature Granulocytes 0 %    NRBCs per 100 WBC 0 <1 /100    Absolute Neutrophils 6.5 1.6 - 8.3 10e3/uL    Absolute Lymphocytes 3.2 0.8 - 5.3 10e3/uL    Absolute Monocytes 1.0 0.0 - 1.3 10e3/uL    Absolute Eosinophils 0.7 0.0 - 0.7 10e3/uL    Absolute Basophils 0.1 0.0 - 0.2 10e3/uL    Absolute Immature Granulocytes 0.0 <=0.4 10e3/uL    Absolute NRBCs 0.0 10e3/uL      EKG: appears normal, NSR, normal axis, normal intervals, no acute ST/T changes c/w ischemia, no LVH by voltage criteria, nonspecific ST-T changes, unchanged from previous tracings    Revised Cardiac Risk Index (RCRI):  The patient has the following serious cardiovascular risks for perioperative complications:   - No serious cardiac risks = 0 points     RCRI Interpretation: 0 points: Class I (very low risk - 0.4% complication rate)           Signed Electronically by: Robert Blakely MD  Copy of this evaluation report is provided to requesting physician.

## 2023-03-20 ENCOUNTER — OFFICE VISIT (OUTPATIENT)
Dept: OBGYN | Facility: OTHER | Age: 68
End: 2023-03-20
Attending: OBSTETRICS & GYNECOLOGY
Payer: COMMERCIAL

## 2023-03-20 VITALS
BODY MASS INDEX: 29.41 KG/M2 | HEIGHT: 63 IN | SYSTOLIC BLOOD PRESSURE: 118 MMHG | DIASTOLIC BLOOD PRESSURE: 76 MMHG | HEART RATE: 64 BPM | WEIGHT: 166 LBS

## 2023-03-20 DIAGNOSIS — L90.0 LICHEN SCLEROSUS: Primary | ICD-10-CM

## 2023-03-20 PROCEDURE — G0463 HOSPITAL OUTPT CLINIC VISIT: HCPCS | Mod: 25

## 2023-03-20 PROCEDURE — G0463 HOSPITAL OUTPT CLINIC VISIT: HCPCS

## 2023-03-20 PROCEDURE — 99213 OFFICE O/P EST LOW 20 MIN: CPT | Performed by: OBSTETRICS & GYNECOLOGY

## 2023-03-20 RX ORDER — MOMETASONE FUROATE 1 MG/G
OINTMENT TOPICAL
Qty: 45 G | Refills: 3 | Status: SHIPPED | OUTPATIENT
Start: 2023-03-20 | End: 2024-08-20

## 2023-03-20 RX ORDER — ATORVASTATIN CALCIUM 10 MG/1
10 TABLET, FILM COATED ORAL
COMMUNITY
Start: 2023-02-21 | End: 2023-03-20

## 2023-03-20 ASSESSMENT — PAIN SCALES - GENERAL: PAINLEVEL: NO PAIN (0)

## 2023-03-20 NOTE — PROGRESS NOTES
CHIEF COMPLAINT / REASON FOR VISIT  Patient presents for Gynecology visit for lichen sclerosis follow up.    HISTORY OF PRESENT ILLNESS  Katelyn Manley is a 67 year old  with No LMP recorded. Patient is postmenopausal. who presents for lichen sclerosis follow up.  She is postmenopausal and denies HRT. She had a vulvar biopsy in  confirming lichen sclerosis and was started on clobetasol ointment.  Her vulvar itching and irritation and burning symptoms have resolved on clobetasol.  She is currently taking clobetasol ointment 2-3 times per week. She denies difficulty urinating, dysuria, hematuria, or flank pain.  No fever or chills.  No vaginal discharge, bleeding, or malodor.  She is sexually active and denies issues with intercourse.    MENSTRUAL HISTORY  Postmenopausal: Yes.  Menopause was at age 49.  She denies bleeding since menopause.  She is sexually active and denies issues with intercourse.   Dyspareunia: Denies.  Postcoital spotting: Denies.  Current contraception: Hysterectomy, postmenopausal.  STD History: No STD history.  Last Pap smear history: Normal.  Mammogram history: Normal.    ALLERGIES     Allergies   Allergen Reactions     Cats      Cat/feline product derivatives     Dogs      Horse-Derived Products      And cows     Pollen Extract Cough       MEDICATIONS    Current Outpatient Medications:      albuterol (PROAIR HFA/PROVENTIL HFA/VENTOLIN HFA) 108 (90 Base) MCG/ACT inhaler, Inhale 2 puffs into the lungs every 6 hours as needed for shortness of breath or wheezing, Disp: 18 g, Rfl: 3     atorvastatin (LIPITOR) 10 MG tablet, TAKE 1 TABLET BY MOUTH DAILY, Disp: 90 tablet, Rfl: 3     clobetasol (TEMOVATE) 0.05 % external ointment, Apply topically At Bedtime, Disp: 60 g, Rfl: 4     GLUCOSAMINE SULFATE PO, Take 2,000 mg by mouth daily, Disp: , Rfl:      mometasone (ELOCON) 0.1 % external ointment, Apply topically three times a week Use as directed at bed time, Disp: 45 g, Rfl: 3     Multiple  "Vitamins-Minerals (MULTIVITAMIN OR), Take 1 capsule by mouth daily., Disp: , Rfl:      Psyllium (METAMUCIL PO), , Disp: , Rfl:      Turmeric 500 MG CAPS, , Disp: , Rfl:      Vitamin D, Cholecalciferol, 1000 units CAPS, Take 2 tablets by mouth daily, Disp: , Rfl:     REVIEW OF SYSTEMS  As per HPI, otherwise negative    The patient's Medical Hx, Surgical Hx, Obstetrical Hx, Social Hx, and Family Hx have been reviewed and updated in the electronic medical record.    OBJECTIVE  /76   Pulse 64   Ht 1.6 m (5' 3\")   Wt 75.3 kg (166 lb)   BMI 29.41 kg/m      General:  Well-developed, well-nourished female in no apparent distress.  Neurological: Alert and oriented x3.  Lungs:  Clear to auscultation bilaterally with good inspiratory effort.  No wheezing rhonchi or rales noted. Breathing nonlabored.  Heart:  Regular rate and rhythm without murmur. No JVD.  No peripheral vascular disease.  Abdomen: Soft, nontender, nondistended, positive bowel sounds.  No organomegaly. No rebound, no guarding.  Pelvic exam: Atrophic external female genitalia with agglutination and thinning of the labia majora and minora with loss of architecture.  No white parchment paper appearance, no ulcerations, no abnormal vasculature. Normal pubic hair distribution. Urethral meatus normal in appearance and without masses. Normal Bartholin, Urethral and [place]'s glands. Vaginal mucosa is atrophic. No vaginal lesions.  Well estrogenized vaginal mucosa.  No cystocele or rectocele.  Cervix and uterus are absent.  No adnexal masses or tenderness bilaterally.  Rectal exam:  No external hemorrhoids noted. No rectovaginal nodularity noted. Examination confirms the vaginal exam.  Extremities:  No clubbing cyanosis or edema. Nontender bilaterally.     Chaperone: Ernestina Pedro N    DIAGNOSTICS  10/31/2022 Vulvar biopsy: Lichen sclerosus    ASSESSMENT / PLAN  Katelyn Manley is a 67 year old  female who presents for lichen sclerosus follow-up.    1 " Lichen sclerosus    - I discussed lichen sclerosis with the patient. Lichen sclerosis is a disease that is characterized by changes to the skin usually involving the vulva and occasionally around the anus. It can cause structural skin changes including tissue fusion of the labia majora and minora, tightening and change in caliber of the vaginal introitus. It usually presents with significant vaginal pruritis and occasionally excoriations from scratching the itchy region. Lichen Sclerosis can progress into vaginal intraepithelial neoplasia (REY) precancerous lesions and even squamous cell carcinoma if left untreated and unmonitored. The mainstay of treatment is topical steroid ointment or cream.  - The patient's symptoms have resolved with a 3 months course of clobetasol ointment.  - I recommend stopping clobetasol ointment and changing to maintenance therapy with Mometasone Furoate ointment 0.1% two to three times weekly.  I gave the patient a prescription for Mometasone Furoate ointment 0.1% with refills.  - I discussed perineal hygiene recommendations and I recommend cotton underwear.  - I recommend the patient avoid scratching her labia and perineum.  - I encouraged patient to use water based lubrication with every instance of intercourse.  - I discussed the increased risk of malignancy in women with lichen sclerosis.  I discussed the need of self-examination for signs of malignancy and annual gynecological examination.  - I recommend follow-up every 12 months.    - Problem list reviewed and updated.  - Follow up in 1 year or sooner as needed.      Arcenio Robledo MD  Obstetrics and Gynecology

## 2023-03-22 RX ORDER — CITALOPRAM HYDROBROMIDE 10 MG/1
10 TABLET ORAL DAILY
COMMUNITY
End: 2023-12-27

## 2023-03-28 ENCOUNTER — TRANSFERRED RECORDS (OUTPATIENT)
Dept: HEALTH INFORMATION MANAGEMENT | Facility: CLINIC | Age: 68
End: 2023-03-28

## 2023-03-31 ENCOUNTER — HOSPITAL ENCOUNTER (OUTPATIENT)
Dept: PHYSICAL THERAPY | Facility: HOSPITAL | Age: 68
Setting detail: THERAPIES SERIES
Discharge: HOME | End: 2023-03-31
Attending: FAMILY MEDICINE
Payer: COMMERCIAL

## 2023-03-31 PROCEDURE — 97161 PT EVAL LOW COMPLEX 20 MIN: CPT | Mod: GP

## 2023-03-31 PROCEDURE — 97110 THERAPEUTIC EXERCISES: CPT | Mod: GP

## 2023-03-31 PROCEDURE — 97016 VASOPNEUMATIC DEVICE THERAPY: CPT | Mod: GP

## 2023-03-31 ASSESSMENT — ACTIVITIES OF DAILY LIVING (ADL)
AS_A_RESULT_OF_YOUR_KNEE_INJURY,_HOW_WOULD_YOU_RATE_YOUR_CURRENT_LEVEL_OF_DAILY_ACTIVITY?: SEVERELY ABNORMAL
PAIN: THE SYMPTOM PREVENTS ME FROM ALL DAILY ACTIVITIES
GO DOWN STAIRS: ACTIVITY IS VERY DIFFICULT
STIFFNESS: THE SYMPTOM PREVENTS ME FROM ALL DAILY ACTIVITIES
RISE FROM A CHAIR: ACTIVITY IS FAIRLY DIFFICULT
GO UP STAIRS: ACTIVITY IS VERY DIFFICULT
KNEEL ON THE FRONT OF YOUR KNEE: I AM UNABLE TO DO THE ACTIVITY
LIMPING: THE SYMPTOM PREVENTS ME FROM ALL DAILY ACTIVITIES
SWELLING: THE SYMPTOM PREVENTS ME FROM ALL DAILY ACTIVITIES
WALK: ACTIVITY IS FAIRLY DIFFICULT
SQUAT: I AM UNABLE TO DO THE ACTIVITY
WEAKNESS: THE SYMPTOM PREVENTS ME FROM ALL DAILY ACTIVITIES
SIT WITH YOUR KNEE BENT: ACTIVITY IS FAIRLY DIFFICULT
HOW_WOULD_YOU_RATE_THE_CURRENT_FUNCTION_OF_YOUR_KNEE_DURING_YOUR_USUAL_DAILY_ACTIVITIES_ON_A_SCALE_FROM_0_TO_100_WITH_100_BEING_YOUR_LEVEL_OF_KNEE_FUNCTION_PRIOR_TO_YOUR_INJURY_AND_0_BEING_THE_INABILITY_TO_PERFORM_ANY_OF_YOUR_USUAL_DAILY_ACTIVITIES?: 0
STAND: ACTIVITY IS FAIRLY DIFFICULT
HOW_WOULD_YOU_RATE_THE_OVERALL_FUNCTION_OF_YOUR_KNEE_DURING_YOUR_USUAL_DAILY_ACTIVITIES?: NEARLY NORMAL

## 2023-03-31 NOTE — PROGRESS NOTES
03/31/23 1200   General Information   Type of Visit Initial OP Ortho PT Evaluation   Start of Care Date 03/31/23   Referring Physician Dr. Dubose   Patient/Family Goals Statement Pt. would like to decrease her pain and return to her PLOF, including recreational walking.   Orders Evaluate and Treat   Date of Order 02/09/23   Certification Required? Yes   Medical Diagnosis S/P RTKA   Surgical/Medical history reviewed Yes   Precautions/Limitations no known precautions/limitations   Weight-Bearing Status - RLE weight-bearing as tolerated       Present No   Body Part(s)   Body Part(s) Knee   Presentation and Etiology   Pertinent history of current problem (include personal factors and/or comorbidities that impact the POC) Pt. presents to therapy with a right TKA resulting as a revision of a previous unicompartmental. Per report and chart review, DOS is 3/28/23. She endorsees 6/10 pain in her right LE that is worse with weightbearing and activity. Pain and swelling has worsened since discharge per her report but is not concerning at this time. She denies any drainage from her incision site. She has begun working on a HEP that includes quad sets, ankle pumps, knee extension stretch, and heel slides. She has been unable to perform her LAQ or SLR due to pain thus far. She does reports some calf pain which began before discharge, however ultrasound imaging was negative. Pt. would like to return to her PLOF.   Impairments A. Pain;B. Decreased WB tolerance;C. Swelling;D. Decreased ROM;E. Decreased flexibility;F. Decreased strength and endurance;G. Impaired balance;H. Impaired gait   Functional Limitations perform activities of daily living;perform desired leisure / sports activities   Symptom Location Pt. reports pain in right knee secondary to TKA   How/Where did it occur Other  (Post op 3/28/23)   Onset date of current episode/exacerbation 03/28/23   Chronicity New   Pain rating (0-10 point  scale) Best (/10);Worst (/10)   Best (/10) 2   Worst (/10) 6   Pain quality A. Sharp;C. Aching;G. Cramping   Frequency of pain/symptoms C. With activity   Pain/symptoms are: The same all the time   Pain/symptoms exacerbated by A. Sitting;B. Walking;G. Certain positions   Pain/symptoms eased by E. Changing positions;H. Cold;I. OTC medication(s)   Progression of symptoms since onset: Worsened   Prior Level of Function   Prior Level of Function-Mobility Independent   Prior Level of Function-ADLs Independent   Current Level of Function   Patient role/employment history F. Retired   Living environment House/[facility]   Home/community accessibility 10-12 stairs in her house which she has been performing successfully thus far.   Current equipment-Gait/Locomotion Front wheeled walker;Standard cane   Current equipment-ADL Raised Toilet Seat   Fall Risk Screen   Fall screen completed by PT   Have you fallen 2 or more times in the past year? Yes  (Right knee gave out when pivoted on the stairs, 2nd one knee buckled when leaving friends.)   Have you fallen and had an injury in the past year? No   Timed Up and Go score (seconds) NT   Is patient a fall risk? No   Abuse Screen (yes response referral indicated)   Feels Unsafe at Home or Work/School no   Feels Threatened by Someone no   Does Anyone Try to Keep You From Having Contact with Others or Doing Things Outside Your Home? no   Physical Signs of Abuse Present no   Presenting problem Total knee replacement   Patient needs abuse support services and resources No   Knee Objective Findings   Gait/Locomotion Antalgic with flexed knee on right.   Observation Moderate edema on right side due to post surgical status.   Integumentary  Intact incision and bandage with no signs of drainage or infection.   Side (if bilateral, select both right and left) Right;Left   Right Knee Extension AROM Lacking 15 degrees   Right Knee Extension PROM Same   Left Knee Extension AROM 0   Left Knee  Extension PROM 0   Right Knee Flexion AROM 85   Right Knee Flexion PROM 85   Left Knee Flexion AROM 125   Left Knee Flexion PROM 125   Right Knee Flexion Strength NT pain   Left Knee Flexion Strength 5/5   Right Knee Extension Strength NT pain   Left Knee Extension Strength 5/5   Right Hip Abduction Strength NT   Left Hip Abduction Strength 4/5   Right Quad Set Strength Poor   Left Quad Set Strength Good   Right Gastrocnemius Flexibility Limited   Left Gastrocnemius Flexibility WNL   Planned Therapy Interventions   Planned Therapy Interventions balance training;joint mobilization;gait training;manual therapy;neuromuscular re-education;ROM;strengthening;stretching   Planned Modality Interventions   Planned Modality Interventions Cryotherapy;Electrical stimulation;Hot packs;Ultrasound   Clinical Impression   Criteria for Skilled Therapeutic Interventions Met yes, treatment indicated   PT Diagnosis S/P R TKA 3/28/23   Influenced by the following impairments Pain, ROM, flexibility. strength, WB tolerance, Activity tolerance, edema.   Functional limitations due to impairments Pt. reports pain and difficulty with daily and recreational activities.   Clinical Presentation Stable/Uncomplicated   Clinical Presentation Rationale Therapist discretion   Clinical Decision Making (Complexity) Low complexity   Therapy Frequency 2 times/Week   Predicted Duration of Therapy Intervention (days/wks) 8 weeks   Risk & Benefits of therapy have been explained Yes   Patient, Family & other staff in agreement with plan of care Yes   Clinical Impression Comments S/P R TKA 3/28/23. Skilled physical therapy is warranted to restore motion, strength, and function.   Education Assessment   Preferred Learning Style Listening;Reading;Demonstration;Pictures/video   Barriers to Learning No barriers   ORTHO GOALS   PT Ortho Eval Goals 1;2;3;4   Ortho Goal 1   Goal Identifier LT #1   Goal Description Pt. to be independent with correct performance of  HEP to progress to independent home management of her condition.   Target Date 05/26/23   Ortho Goal 2   Goal Identifier LTG #2   Goal Description Pt. to report minimal symptom reproduction with daily and recreational activities to return to her PLOF.   Target Date 05/26/23   Ortho Goal 3   Goal Identifier LTG #3   Goal Description Pt. to improve R LE strength and ROM to WNL   Target Date 05/26/23   Ortho Goal 4   Goal Identifier STG #1   Goal Description Pt. to improve knee ROM by 50% to improve ease and comfort with daily activities.   Target Date 04/28/23   Total Evaluation Time   PT Eval, Low Complexity Minutes (24767) 30   Therapy Certification   Certification date from 03/31/23   Certification date to 05/26/23   Medical Diagnosis S/P RTKA   I certify the need for these services furnished under this plan of treatment and while under my care. (Physician co-signature of this document indicates review and certification of the therapy plan).      _____________________________     __________________________    ____________  Physician's Signature                 Date               Time

## 2023-04-10 ENCOUNTER — HOSPITAL ENCOUNTER (OUTPATIENT)
Dept: PHYSICAL THERAPY | Facility: HOSPITAL | Age: 68
Setting detail: THERAPIES SERIES
Discharge: HOME | End: 2023-04-10
Attending: FAMILY MEDICINE
Payer: COMMERCIAL

## 2023-04-10 PROCEDURE — 97110 THERAPEUTIC EXERCISES: CPT | Mod: GP

## 2023-04-11 ENCOUNTER — TRANSFERRED RECORDS (OUTPATIENT)
Dept: HEALTH INFORMATION MANAGEMENT | Facility: CLINIC | Age: 68
End: 2023-04-11

## 2023-04-13 ENCOUNTER — HOSPITAL ENCOUNTER (OUTPATIENT)
Dept: PHYSICAL THERAPY | Facility: HOSPITAL | Age: 68
Setting detail: THERAPIES SERIES
Discharge: HOME | End: 2023-04-13
Attending: FAMILY MEDICINE
Payer: COMMERCIAL

## 2023-04-13 PROCEDURE — 97110 THERAPEUTIC EXERCISES: CPT | Mod: GP

## 2023-04-17 ENCOUNTER — HOSPITAL ENCOUNTER (OUTPATIENT)
Dept: PHYSICAL THERAPY | Facility: HOSPITAL | Age: 68
Setting detail: THERAPIES SERIES
Discharge: HOME | End: 2023-04-17
Attending: FAMILY MEDICINE
Payer: COMMERCIAL

## 2023-04-17 PROCEDURE — 97110 THERAPEUTIC EXERCISES: CPT | Mod: GP

## 2023-04-20 ENCOUNTER — HOSPITAL ENCOUNTER (OUTPATIENT)
Dept: PHYSICAL THERAPY | Facility: HOSPITAL | Age: 68
Setting detail: THERAPIES SERIES
Discharge: HOME | End: 2023-04-20
Attending: FAMILY MEDICINE
Payer: COMMERCIAL

## 2023-04-20 PROCEDURE — 97110 THERAPEUTIC EXERCISES: CPT | Mod: GP,CQ

## 2023-04-24 ENCOUNTER — HOSPITAL ENCOUNTER (OUTPATIENT)
Dept: PHYSICAL THERAPY | Facility: HOSPITAL | Age: 68
Setting detail: THERAPIES SERIES
Discharge: HOME | End: 2023-04-24
Attending: FAMILY MEDICINE
Payer: COMMERCIAL

## 2023-04-24 PROCEDURE — 97110 THERAPEUTIC EXERCISES: CPT | Mod: GP

## 2023-04-24 PROCEDURE — 97140 MANUAL THERAPY 1/> REGIONS: CPT | Mod: GP

## 2023-04-27 ENCOUNTER — HOSPITAL ENCOUNTER (OUTPATIENT)
Dept: PHYSICAL THERAPY | Facility: HOSPITAL | Age: 68
Setting detail: THERAPIES SERIES
Discharge: HOME | End: 2023-04-27
Attending: FAMILY MEDICINE
Payer: COMMERCIAL

## 2023-04-27 PROCEDURE — 97110 THERAPEUTIC EXERCISES: CPT | Mod: GP,CQ

## 2023-04-27 PROCEDURE — 97140 MANUAL THERAPY 1/> REGIONS: CPT | Mod: GP,CQ

## 2023-05-01 ENCOUNTER — HOSPITAL ENCOUNTER (OUTPATIENT)
Dept: PHYSICAL THERAPY | Facility: HOSPITAL | Age: 68
Setting detail: THERAPIES SERIES
Discharge: HOME | End: 2023-05-01
Attending: FAMILY MEDICINE
Payer: COMMERCIAL

## 2023-05-01 PROCEDURE — 97110 THERAPEUTIC EXERCISES: CPT | Mod: GP

## 2023-05-01 PROCEDURE — 97140 MANUAL THERAPY 1/> REGIONS: CPT | Mod: GP

## 2023-05-04 ENCOUNTER — HOSPITAL ENCOUNTER (OUTPATIENT)
Dept: PHYSICAL THERAPY | Facility: HOSPITAL | Age: 68
Setting detail: THERAPIES SERIES
Discharge: HOME | End: 2023-05-04
Attending: FAMILY MEDICINE
Payer: COMMERCIAL

## 2023-05-04 PROCEDURE — 97110 THERAPEUTIC EXERCISES: CPT | Mod: GP,CQ

## 2023-05-08 ENCOUNTER — HOSPITAL ENCOUNTER (OUTPATIENT)
Dept: PHYSICAL THERAPY | Facility: HOSPITAL | Age: 68
Setting detail: THERAPIES SERIES
Discharge: HOME | End: 2023-05-08
Attending: FAMILY MEDICINE
Payer: COMMERCIAL

## 2023-05-08 PROCEDURE — 97110 THERAPEUTIC EXERCISES: CPT | Mod: GP,CQ

## 2023-05-11 ENCOUNTER — HOSPITAL ENCOUNTER (OUTPATIENT)
Dept: PHYSICAL THERAPY | Facility: HOSPITAL | Age: 68
Setting detail: THERAPIES SERIES
Discharge: HOME | End: 2023-05-11
Attending: FAMILY MEDICINE
Payer: COMMERCIAL

## 2023-05-11 PROCEDURE — 97110 THERAPEUTIC EXERCISES: CPT | Mod: GP

## 2023-05-16 ENCOUNTER — HOSPITAL ENCOUNTER (OUTPATIENT)
Dept: PHYSICAL THERAPY | Facility: HOSPITAL | Age: 68
Setting detail: THERAPIES SERIES
Discharge: HOME | End: 2023-05-16
Attending: FAMILY MEDICINE
Payer: COMMERCIAL

## 2023-05-16 PROCEDURE — 97110 THERAPEUTIC EXERCISES: CPT | Mod: GP,CQ

## 2023-05-18 ENCOUNTER — THERAPY VISIT (OUTPATIENT)
Dept: PHYSICAL THERAPY | Facility: HOSPITAL | Age: 68
End: 2023-05-18
Attending: FAMILY MEDICINE
Payer: COMMERCIAL

## 2023-05-18 DIAGNOSIS — Z96.651 STATUS POST TOTAL RIGHT KNEE REPLACEMENT: ICD-10-CM

## 2023-05-18 PROCEDURE — 97110 THERAPEUTIC EXERCISES: CPT | Mod: GP

## 2023-05-23 ENCOUNTER — THERAPY VISIT (OUTPATIENT)
Dept: PHYSICAL THERAPY | Facility: HOSPITAL | Age: 68
End: 2023-05-23
Attending: FAMILY MEDICINE
Payer: COMMERCIAL

## 2023-05-23 DIAGNOSIS — Z96.651 S/P REVISION OF TOTAL KNEE, RIGHT: Primary | ICD-10-CM

## 2023-05-23 PROCEDURE — 97110 THERAPEUTIC EXERCISES: CPT | Mod: GP,CQ

## 2023-05-25 ENCOUNTER — THERAPY VISIT (OUTPATIENT)
Dept: PHYSICAL THERAPY | Facility: HOSPITAL | Age: 68
End: 2023-05-25
Attending: FAMILY MEDICINE
Payer: COMMERCIAL

## 2023-05-25 DIAGNOSIS — Z96.651 S/P REVISION OF TOTAL KNEE, RIGHT: Primary | ICD-10-CM

## 2023-05-25 PROCEDURE — 97110 THERAPEUTIC EXERCISES: CPT | Mod: GP

## 2023-05-30 ENCOUNTER — MYC MEDICAL ADVICE (OUTPATIENT)
Dept: FAMILY MEDICINE | Facility: OTHER | Age: 68
End: 2023-05-30

## 2023-05-30 ENCOUNTER — TELEPHONE (OUTPATIENT)
Dept: FAMILY MEDICINE | Facility: OTHER | Age: 68
End: 2023-05-30

## 2023-05-30 NOTE — TELEPHONE ENCOUNTER
She sent me a My chart also. I do not know what is most recent. She said she has Diverticulitis.  IF not better - see one of my partners or go to ER. May need CT scan.

## 2023-05-30 NOTE — TELEPHONE ENCOUNTER
9:05 AM    Reason for Call: OVERBOOK    Patient is having the following symptoms:  Just not feeling well , lethargic, sometimes dizzy  for  a while    The patient is requesting an appointment for ASAP with Dr. Blakely    Was an appointment offered for this call? No  If yes : Appointment type              Date    Preferred method for responding to this message: Telephone Call  What is your phone number ?  [phone]    If we cannot reach you directly, may we leave a detailed response at the number you provided? Yes    Can this message wait until your PCP/provider returns, if unavailable today? YES,     Evelia Hogue

## 2023-05-30 NOTE — TELEPHONE ENCOUNTER
-- DO NOT REPLY / DO NOT REPLY ALL --  -- Message is from the [facility]--    COVID-19 Universal Screening: Negative    General Patient Message      Reason for Call: The patient received a letter about her medication metFORMIN (GLUCOPHAGE-XR) 500 MG and she is concern about the recall and wants to know should she continue to take these pills or not. Can you call the patient please with this information.     Caller Information       Type Contact Phone    06/26/2020 08:20 AM Phone (Incoming) Zulema Agustin (Self) [phone] (M)          Alternative phone number: n/a    Turnaround time given to caller:   \"This message will be sent to [state Provider's name]. The clinical team will fulfill your request as soon as they review your message.\"     I already scheduled her tomorrow, do you want me to send her to ER then?

## 2023-05-31 ENCOUNTER — OFFICE VISIT (OUTPATIENT)
Dept: FAMILY MEDICINE | Facility: OTHER | Age: 68
End: 2023-05-31
Attending: FAMILY MEDICINE
Payer: COMMERCIAL

## 2023-05-31 VITALS
HEART RATE: 88 BPM | SYSTOLIC BLOOD PRESSURE: 98 MMHG | TEMPERATURE: 98.2 F | OXYGEN SATURATION: 98 % | DIASTOLIC BLOOD PRESSURE: 72 MMHG | BODY MASS INDEX: 28.34 KG/M2 | WEIGHT: 160 LBS | RESPIRATION RATE: 18 BRPM

## 2023-05-31 DIAGNOSIS — R53.81 MALAISE: ICD-10-CM

## 2023-05-31 DIAGNOSIS — R73.03 PREDIABETES: ICD-10-CM

## 2023-05-31 DIAGNOSIS — K57.32 DIVERTICULITIS OF COLON: Primary | ICD-10-CM

## 2023-05-31 DIAGNOSIS — K59.00 CONSTIPATION, UNSPECIFIED CONSTIPATION TYPE: ICD-10-CM

## 2023-05-31 LAB
ALBUMIN SERPL BCG-MCNC: 3.8 G/DL (ref 3.5–5.2)
ALP SERPL-CCNC: 114 U/L (ref 35–104)
ALT SERPL W P-5'-P-CCNC: 17 U/L (ref 10–35)
ANION GAP SERPL CALCULATED.3IONS-SCNC: 13 MMOL/L (ref 7–15)
AST SERPL W P-5'-P-CCNC: 25 U/L (ref 10–35)
BASOPHILS # BLD AUTO: 0.1 10E3/UL (ref 0–0.2)
BASOPHILS NFR BLD AUTO: 1 %
BILIRUB SERPL-MCNC: 0.4 MG/DL
BUN SERPL-MCNC: 21.1 MG/DL (ref 8–23)
CALCIUM SERPL-MCNC: 9.6 MG/DL (ref 8.8–10.2)
CHLORIDE SERPL-SCNC: 101 MMOL/L (ref 98–107)
CREAT SERPL-MCNC: 1.14 MG/DL (ref 0.51–0.95)
DEPRECATED HCO3 PLAS-SCNC: 22 MMOL/L (ref 22–29)
EOSINOPHIL # BLD AUTO: 0.4 10E3/UL (ref 0–0.7)
EOSINOPHIL NFR BLD AUTO: 5 %
ERYTHROCYTE [DISTWIDTH] IN BLOOD BY AUTOMATED COUNT: 13.4 % (ref 10–15)
EST. AVERAGE GLUCOSE BLD GHB EST-MCNC: 117 MG/DL
GFR SERPL CREATININE-BSD FRML MDRD: 52 ML/MIN/1.73M2
GLUCOSE SERPL-MCNC: 121 MG/DL (ref 70–99)
HBA1C MFR BLD: 5.7 %
HCT VFR BLD AUTO: 37.6 % (ref 35–47)
HGB BLD-MCNC: 12.3 G/DL (ref 11.7–15.7)
IMM GRANULOCYTES # BLD: 0 10E3/UL
IMM GRANULOCYTES NFR BLD: 0 %
LYMPHOCYTES # BLD AUTO: 1.8 10E3/UL (ref 0.8–5.3)
LYMPHOCYTES NFR BLD AUTO: 20 %
MCH RBC QN AUTO: 27.8 PG (ref 26.5–33)
MCHC RBC AUTO-ENTMCNC: 32.7 G/DL (ref 31.5–36.5)
MCV RBC AUTO: 85 FL (ref 78–100)
MONOCYTES # BLD AUTO: 0.9 10E3/UL (ref 0–1.3)
MONOCYTES NFR BLD AUTO: 10 %
NEUTROPHILS # BLD AUTO: 5.6 10E3/UL (ref 1.6–8.3)
NEUTROPHILS NFR BLD AUTO: 64 %
NRBC # BLD AUTO: 0 10E3/UL
NRBC BLD AUTO-RTO: 0 /100
PLATELET # BLD AUTO: 277 10E3/UL (ref 150–450)
POTASSIUM SERPL-SCNC: 4 MMOL/L (ref 3.4–5.3)
PROT SERPL-MCNC: 7.8 G/DL (ref 6.4–8.3)
RBC # BLD AUTO: 4.42 10E6/UL (ref 3.8–5.2)
SODIUM SERPL-SCNC: 136 MMOL/L (ref 136–145)
TSH SERPL DL<=0.005 MIU/L-ACNC: 2.1 UIU/ML (ref 0.3–4.2)
WBC # BLD AUTO: 8.8 10E3/UL (ref 4–11)

## 2023-05-31 PROCEDURE — 85025 COMPLETE CBC W/AUTO DIFF WBC: CPT | Mod: ZL | Performed by: FAMILY MEDICINE

## 2023-05-31 PROCEDURE — 84443 ASSAY THYROID STIM HORMONE: CPT | Mod: ZL | Performed by: FAMILY MEDICINE

## 2023-05-31 PROCEDURE — 99214 OFFICE O/P EST MOD 30 MIN: CPT | Performed by: FAMILY MEDICINE

## 2023-05-31 PROCEDURE — 80053 COMPREHEN METABOLIC PANEL: CPT | Mod: ZL | Performed by: FAMILY MEDICINE

## 2023-05-31 PROCEDURE — 83036 HEMOGLOBIN GLYCOSYLATED A1C: CPT | Mod: ZL | Performed by: FAMILY MEDICINE

## 2023-05-31 PROCEDURE — G0463 HOSPITAL OUTPT CLINIC VISIT: HCPCS

## 2023-05-31 PROCEDURE — 36415 COLL VENOUS BLD VENIPUNCTURE: CPT | Mod: ZL | Performed by: FAMILY MEDICINE

## 2023-05-31 PROCEDURE — 82310 ASSAY OF CALCIUM: CPT | Mod: ZL | Performed by: FAMILY MEDICINE

## 2023-05-31 RX ORDER — CLOBETASOL PROPIONATE 0.5 MG/G
OINTMENT TOPICAL
COMMUNITY
End: 2023-10-30

## 2023-05-31 RX ORDER — OXYCODONE HYDROCHLORIDE 5 MG/1
5-10 TABLET ORAL
COMMUNITY
Start: 2023-03-29 | End: 2023-10-30

## 2023-05-31 ASSESSMENT — PAIN SCALES - GENERAL: PAINLEVEL: MILD PAIN (3)

## 2023-05-31 NOTE — PROGRESS NOTES
"  Assessment & Plan     1. Diverticulitis of colon  Patient notes that this episode feels the same as previous ones. Last colonoscopy in 2019 was unremarkable besides diverticulosis. Physical exam showed suprapubic and LLQ pain. She has no urinary symptoms. Discussed that this seems like another one of her episodes of diverticulitis with constipation. Discussed use of Augmentin that she had on hand from previous bout and will prescribe another in case she gets again. Also discussed bland foods and bowel rest along with stool softener/milk of magnesia/suppository to help with bowel movements, which may be contributing to her pain as well. Otherwise it seems like she has been improving on own and will not get labs/imaging at this time. Discussed to return if symptoms worsen or do not get better. She is comfortable with plan and has no other questions or concerns at this time.  - amoxicillin-clavulanate (AUGMENTIN) 875-125 MG tablet; Take 1 tablet by mouth 2 times daily  Dispense: 20 tablet; Refill: 0    2. Constipation, unspecified constipation type  See above    3. Malaise  She has had fatigue and not feeling well over last 2 months. She has had recent total right knee done and has been less active which may be contributing to this. She has no other symptoms. Post operative hemoglobin was lower, so will get a couple labs to check if anything else is going on and if hemoglobin is still low. Will reach out to her with results. She is comfortable with plan and has no other questions or concerns.  - CBC with Platelets & Differential  - Comprehensive metabolic panel  - TSH    4. Prediabetes  A1C previously elevate slightly. May be contributing to symptoms. Will reach out with results.  - Hemoglobin A1c               BMI:   Estimated body mass index is 28.34 kg/m  as calculated from the following:    Height as of 3/20/23: 1.6 m (5' 3\").    Weight as of this encounter: 72.6 kg (160 lb).     No follow-ups on file.    Michael " Katlyn MS4    I was present with the medical student for the service. I personally verified the history of present illness and  performed the physical examination and medical decision making. I have verified all of the medical student s  documentation for this encounter. Dr. Robert Blakely MD  [facility] - MICHELA Zepeda is a 68 year old, presenting for the following health issues:  Fatigue         View : No data to display.              HPI     Suprapubic stomach pain for past 5 days. Has gotten better starting yesterday. Was using oxycodone before that for pain. Used tylenol today.  Has had fever and chills  Vomited yesterday. Feels like she is constipated - last BM Saturday morning - no blood in stool. Uses metamucil and usually goes every day. Notes to not drink enough water. Maybe 4 cups a day. A couple glasses of milk. A couple cups of water.   Has been eating less. Hurts stomach.   Has been using Augmentin since Monday.    Last bout of diverticulitis a couple years ago she thinks.    Fatigue has been last 2 months. Had a recent right total knee done and has been less active.    Acute Illness  Acute illness concerns: diverticulitis, fatigue  Onset/Duration: 2 months  Symptoms:  Fever: No  Chills/Sweats: YES  Headache (location?): No  Sinus Pressure: No  Conjunctivitis:  No  Ear Pain: no  Rhinorrhea: No  Congestion: No  Sore Throat: No  Cough: no  Wheeze: No  Decreased Appetite: YES  Nausea: No  Vomiting: YES  Diarrhea: No  Dysuria/Freq.: No  Dysuria or Hematuria: No  Fatigue/Achiness: YES  Sick/Strep Exposure: No  Therapies tried and outcome: augmentin      Review of Systems   Constitutional, HEENT, cardiovascular, pulmonary, GI, , musculoskeletal, neuro, skin, endocrine and psych systems are negative, except as otherwise noted.      Objective    BP 98/72 (BP Location: Right arm, Patient Position: Sitting, Cuff Size: Adult Large)   Pulse 88   Temp 98.2  F  (36.8  C) (Tympanic)   Resp 18   Wt 72.6 kg (160 lb)   SpO2 98%   BMI 28.34 kg/m    Body mass index is 28.34 kg/m .  Physical Exam   GENERAL: healthy, alert and no distress  EYES: Eyes grossly normal to inspection, PERRL and conjunctivae and sclerae normal  RESP: lungs clear to auscultation - no rales, rhonchi or wheezes  CV: regular rate and rhythm, normal S1 S2, no S3 or S4, no murmur, click or rub, no peripheral edema and peripheral pulses strong  ABDOMEN: Tender over suprapubic area and LLQ.  RLQ and RUQ without tenderness to palpation.  MS: no gross musculoskeletal defects noted, no edema  SKIN: no suspicious lesions or rashes  NEURO: Normal strength and tone, mentation intact and speech normal  PSYCH: mentation appears normal, affect normal/bright    Results for orders placed or performed in visit on 05/31/23   CBC with Platelets & Differential     Status: None (In process)    Narrative    The following orders were created for panel order CBC with Platelets & Differential.  Procedure                               Abnormality         Status                     ---------                               -----------         ------                     CBC with platelets and d...[[id number]]                      In process                   Please view results for these tests on the individual orders.     Labs pending.

## 2023-06-05 ENCOUNTER — TRANSFERRED RECORDS (OUTPATIENT)
Dept: HEALTH INFORMATION MANAGEMENT | Facility: CLINIC | Age: 68
End: 2023-06-05

## 2023-10-23 ENCOUNTER — APPOINTMENT (OUTPATIENT)
Dept: CT IMAGING | Facility: HOSPITAL | Age: 68
End: 2023-10-23
Attending: STUDENT IN AN ORGANIZED HEALTH CARE EDUCATION/TRAINING PROGRAM
Payer: COMMERCIAL

## 2023-10-23 ENCOUNTER — TELEPHONE (OUTPATIENT)
Dept: FAMILY MEDICINE | Facility: OTHER | Age: 68
End: 2023-10-23

## 2023-10-23 ENCOUNTER — HOSPITAL ENCOUNTER (EMERGENCY)
Facility: HOSPITAL | Age: 68
Discharge: HOME | End: 2023-10-23
Attending: STUDENT IN AN ORGANIZED HEALTH CARE EDUCATION/TRAINING PROGRAM | Admitting: STUDENT IN AN ORGANIZED HEALTH CARE EDUCATION/TRAINING PROGRAM
Payer: COMMERCIAL

## 2023-10-23 VITALS
DIASTOLIC BLOOD PRESSURE: 91 MMHG | HEART RATE: 62 BPM | OXYGEN SATURATION: 98 % | TEMPERATURE: 98.3 F | RESPIRATION RATE: 18 BRPM | SYSTOLIC BLOOD PRESSURE: 132 MMHG

## 2023-10-23 DIAGNOSIS — S01.81XA FACIAL LACERATION, INITIAL ENCOUNTER: ICD-10-CM

## 2023-10-23 DIAGNOSIS — S09.90XA INJURY OF HEAD, INITIAL ENCOUNTER: ICD-10-CM

## 2023-10-23 DIAGNOSIS — R55 SYNCOPE, UNSPECIFIED SYNCOPE TYPE: ICD-10-CM

## 2023-10-23 PROBLEM — E78.00 HYPERCHOLESTEREMIA: Status: ACTIVE | Noted: 2023-10-23

## 2023-10-23 LAB
ANION GAP SERPL CALCULATED.3IONS-SCNC: 11 MMOL/L (ref 7–15)
BUN SERPL-MCNC: 23.4 MG/DL (ref 8–23)
CALCIUM SERPL-MCNC: 9.5 MG/DL (ref 8.8–10.2)
CHLORIDE SERPL-SCNC: 102 MMOL/L (ref 98–107)
CREAT SERPL-MCNC: 1.2 MG/DL (ref 0.51–0.95)
DEPRECATED HCO3 PLAS-SCNC: 23 MMOL/L (ref 22–29)
EGFRCR SERPLBLD CKD-EPI 2021: 49 ML/MIN/1.73M2
ERYTHROCYTE [DISTWIDTH] IN BLOOD BY AUTOMATED COUNT: 13.8 % (ref 10–15)
GLUCOSE SERPL-MCNC: 141 MG/DL (ref 70–99)
HCT VFR BLD AUTO: 37.6 % (ref 35–47)
HGB BLD-MCNC: 12.8 G/DL (ref 11.7–15.7)
HOLD SPECIMEN: NORMAL
MCH RBC QN AUTO: 28.8 PG (ref 26.5–33)
MCHC RBC AUTO-ENTMCNC: 34 G/DL (ref 31.5–36.5)
MCV RBC AUTO: 85 FL (ref 78–100)
PLATELET # BLD AUTO: 236 10E3/UL (ref 150–450)
POTASSIUM SERPL-SCNC: 4.1 MMOL/L (ref 3.4–5.3)
RBC # BLD AUTO: 4.45 10E6/UL (ref 3.8–5.2)
SODIUM SERPL-SCNC: 136 MMOL/L (ref 135–145)
TROPONIN T SERPL HS-MCNC: 7 NG/L
WBC # BLD AUTO: 6 10E3/UL (ref 4–11)

## 2023-10-23 PROCEDURE — 13132 CMPLX RPR F/C/C/M/N/AX/G/H/F: CPT | Performed by: STUDENT IN AN ORGANIZED HEALTH CARE EDUCATION/TRAINING PROGRAM

## 2023-10-23 PROCEDURE — 80048 BASIC METABOLIC PNL TOTAL CA: CPT | Performed by: STUDENT IN AN ORGANIZED HEALTH CARE EDUCATION/TRAINING PROGRAM

## 2023-10-23 PROCEDURE — 70450 CT HEAD/BRAIN W/O DYE: CPT

## 2023-10-23 PROCEDURE — 84484 ASSAY OF TROPONIN QUANT: CPT | Performed by: STUDENT IN AN ORGANIZED HEALTH CARE EDUCATION/TRAINING PROGRAM

## 2023-10-23 PROCEDURE — 250N000009 HC RX 250: Performed by: STUDENT IN AN ORGANIZED HEALTH CARE EDUCATION/TRAINING PROGRAM

## 2023-10-23 PROCEDURE — 99284 EMERGENCY DEPT VISIT MOD MDM: CPT | Mod: 25 | Performed by: STUDENT IN AN ORGANIZED HEALTH CARE EDUCATION/TRAINING PROGRAM

## 2023-10-23 PROCEDURE — 250N000013 HC RX MED GY IP 250 OP 250 PS 637: Performed by: STUDENT IN AN ORGANIZED HEALTH CARE EDUCATION/TRAINING PROGRAM

## 2023-10-23 PROCEDURE — 99285 EMERGENCY DEPT VISIT HI MDM: CPT | Mod: 25

## 2023-10-23 PROCEDURE — 36415 COLL VENOUS BLD VENIPUNCTURE: CPT | Performed by: STUDENT IN AN ORGANIZED HEALTH CARE EDUCATION/TRAINING PROGRAM

## 2023-10-23 PROCEDURE — 93010 ELECTROCARDIOGRAM REPORT: CPT | Mod: 77 | Performed by: INTERNAL MEDICINE

## 2023-10-23 PROCEDURE — 85027 COMPLETE CBC AUTOMATED: CPT | Performed by: STUDENT IN AN ORGANIZED HEALTH CARE EDUCATION/TRAINING PROGRAM

## 2023-10-23 PROCEDURE — 93005 ELECTROCARDIOGRAM TRACING: CPT

## 2023-10-23 PROCEDURE — 13132 CMPLX RPR F/C/C/M/N/AX/G/H/F: CPT

## 2023-10-23 PROCEDURE — 72125 CT NECK SPINE W/O DYE: CPT

## 2023-10-23 RX ORDER — LIDOCAINE HYDROCHLORIDE AND EPINEPHRINE 10; 10 MG/ML; UG/ML
5 INJECTION, SOLUTION INFILTRATION; PERINEURAL ONCE
Status: COMPLETED | OUTPATIENT
Start: 2023-10-23 | End: 2023-10-23

## 2023-10-23 RX ORDER — ACETAMINOPHEN 325 MG/1
975 TABLET ORAL ONCE
Status: COMPLETED | OUTPATIENT
Start: 2023-10-23 | End: 2023-10-23

## 2023-10-23 RX ADMIN — ACETAMINOPHEN 975 MG: 325 TABLET, FILM COATED ORAL at 09:36

## 2023-10-23 RX ADMIN — LIDOCAINE HYDROCHLORIDE,EPINEPHRINE BITARTRATE 5 ML: 10; .01 INJECTION, SOLUTION INFILTRATION; PERINEURAL at 12:08

## 2023-10-23 ASSESSMENT — ACTIVITIES OF DAILY LIVING (ADL): ADLS_ACUITY_SCORE: 37

## 2023-10-23 NOTE — ED PROVIDER NOTES
"  History     Chief Complaint   Patient presents with     Fall     HPI  Katelyn Manley is a 68 year old female with PMH hypercholesterolemia, CKD, presenting after a fall/head injury. She got up to go to the bathroom this morning and took a few steps towards the bathroom. The next thing she remembers she was on the ground waking up. She does not recollect any lightheadedness, shortness of breath, ear ringing, nausea. No hx of seizures. No one else was at home. She does not feel like she was on the ground very long. Her  was out hunting. She is not currently lightheaded. No difficulty with speech. No weakness in arms/legs. She does feel mildly \"queasy.\" Not on blood thinners.     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       Status post vaginal hysterectomy 09/12/2016     Priority: Medium     Bso,sling,post repair, [id number]       Dyslipidemia 05/15/2014     Priority: Medium     Lichen sclerosus 09/16/2013     Priority: Medium     Leukoplakia 07/03/2013     Priority: Medium     Pre-diabetes 05/06/2013     Priority: Medium        Past Medical History:    Past Medical History:   Diagnosis Date     Adjustment disorder with mixed anxiety and depressed mood 10/26/2020     Chronic " kidney disease, stage 3a (H) 11/23/2021     Cyst of Bartholin's gland 10/22/2007     Depressive disorder 2010     Diabetes (H) 2007     Diverticulosis of large intestine without hemorrhage 10/23/2018     Dyslipidemia 05/15/2014     Endometrial hyperplasia without atypia, simple      Glucose intolerance (impaired glucose tolerance) 790.22     Hypercholesterolemia 08/23/2011     Lichen sclerosus 09/16/2013     Menopause 05/06/2013     Osteoarthrosis, unspecified whether generalized or localized, lower leg 07/09/2002     Pre-diabetes 05/06/2013     Reactive airway disease that is not asthma 04/04/2018     Special screening for malignant neoplasms, colon 10/15/2004     Uncomplicated asthma 1990       Past Surgical History:    Past Surgical History:   Procedure Laterality Date     ARTHROSCOPY KNEE Left     x 2     BIOPSY  2013    For lichen sclerosis diagnosis     COLONOSCOPY  2004     COLONOSCOPY  02/03/2012    Dr Sanchez/ sigmoid diverticular dz     COLPORRHAPHY POSTERIOR N/A 02/18/2015    Procedure: COLPORRHAPHY POSTERIOR;  Surgeon: Vasquez Sauer MD;  Location: [facility]     CYSTOSCOPY N/A 02/18/2015    Procedure: CYSTOSCOPY;  Surgeon: Vasquez Sauer MD;  Location: [facility]     DILATION AND CURETTAGE, HYSTEROSCOPY DIAGNOSTIC, COMBINED  05/07/2014    Procedure: COMBINED DILATION AND CURETTAGE, HYSTEROSCOPY DIAGNOSTIC;  Surgeon: Vasquez Sauer MD;  Location: [facility]     EYE SURGERY Left 08/2016    macular hole repair     HYSTERECTOMY VAGINAL, BILATERAL SALPINGO-OOPHERECTOMY, COMBINED N/A 02/18/2015    Procedure: COMBINED HYSTERECTOMY VAGINAL, SALPINGO-OOPHORECTOMY;  Surgeon: Vasquez Sauer MD;  Location: [facility]     PHACOEMULSIFICATION WITH STANDARD INTRAOCULAR LENS IMPLANT Left 04/24/2018    Procedure: PHACOEMULSIFICATION WITH STANDARD INTRAOCULAR LENS IMPLANT;  PHACOEMULSIFICATION CATARACT EXTRACTION POSTERIOR CHAMBER LENS LEFT/TECNIS-TORIC, 10% CORINA;  Surgeon: Jerome Snow MD;  Location: [facility]     REPLACEMENT TOTAL KNEE Left  2004     SLING TRANSOBTURATOR N/A 02/18/2015    Procedure: SLING TRANSOBTURATOR;  Surgeon: Vasquez Sauer MD;  Location: [facility]       Family History:    Family History   Problem Relation Age of Onset     Other - See Comments Mother         Glenn Granulometosis - cause of death     Other Cancer Mother         Wegeners granulomatosis     Coronary Artery Disease Father         Heart attack age 55     Cerebrovascular Disease Father 77        CVA - cause of death     Coronary Artery Disease Brother      Other - See Comments Brother         multiple sclerosis       Social History:  Marital Status:   [2]  Social History     Tobacco Use     Smoking status: Never     Smokeless tobacco: Never   Vaping Use     Vaping Use: Never used   Substance Use Topics     Alcohol use: Yes     Comment: Occasionally never daily     Drug use: Never        Medications:    albuterol (PROAIR HFA/PROVENTIL HFA/VENTOLIN HFA) 108 (90 Base) MCG/ACT inhaler  amoxicillin-clavulanate (AUGMENTIN) 875-125 MG tablet  atorvastatin (LIPITOR) 10 MG tablet  citalopram (CELEXA) 10 MG tablet  clobetasol (TEMOVATE) 0.05 % external ointment  FP GLUCOSAMINE SULFATE OR  GLUCOSAMINE SULFATE PO  mometasone (ELOCON) 0.1 % external ointment  Multiple Vitamins-Minerals (MULTIVITAMIN OR)  oxyCODONE (ROXICODONE) 5 MG tablet  Psyllium (METAMUCIL PO)  Vitamin D, Cholecalciferol, 1000 units CAPS          Review of Systems   All other systems reviewed and are negative.      Physical Exam   BP: 156/99  Pulse: 71  Temp: 97.4  F (36.3  C)  Resp: 18  SpO2: 96 %      Physical Exam  Vitals and nursing note reviewed.   Constitutional:       General: She is not in acute distress.     Appearance: Normal appearance. She is not ill-appearing or toxic-appearing.   HENT:      Head: Normocephalic.      Comments: Large laceration above the left eyebrow.     Right Ear: External ear normal.      Left Ear: External ear normal.      Nose: Nose normal. No congestion or rhinorrhea.       Mouth/Throat:      Mouth: Mucous membranes are moist.      Pharynx: Oropharynx is clear.   Eyes:      Extraocular Movements: Extraocular movements intact.      Pupils: Pupils are equal, round, and reactive to light.   Cardiovascular:      Rate and Rhythm: Normal rate and regular rhythm.      Pulses: Normal pulses.      Heart sounds: Normal heart sounds.   Pulmonary:      Effort: Pulmonary effort is normal.      Breath sounds: Normal breath sounds.   Abdominal:      General: Abdomen is flat.      Palpations: Abdomen is soft.      Tenderness: There is no abdominal tenderness.   Musculoskeletal:         General: No swelling or tenderness. Normal range of motion.      Cervical back: Normal range of motion and neck supple. No tenderness.   Skin:     General: Skin is warm and dry.      Capillary Refill: Capillary refill takes less than 2 seconds.   Neurological:      General: No focal deficit present.      Mental Status: She is alert and oriented to person, place, and time.      Cranial Nerves: No cranial nerve deficit.      Sensory: No sensory deficit.      Motor: No weakness.      Coordination: Coordination normal.      Gait: Gait normal.   Psychiatric:         Mood and Affect: Mood normal.         Behavior: Behavior normal.         ED Course              ED Course as of 10/23/23 1204   Mon Oct 23, 2023   1158 Work-up unremarkable. No obvious etiology for syncope. Recommended admission for obsevation given the unknown circumstances and age as risk factors after syncope.   1159 Patient understands risks and benefits and refuses admission.   1159 Laceration repaired.    1159 Given that she won't be admitted by her own choice, will try to coordinate outpatient cardiac monitoring.     [facility]    -Laceration Repair    Date/Time: 10/23/2023 12:01 PM    Performed by: Koffi Barton MD  Authorized by: Koffi Barton MD    Risks, benefits and alternatives discussed.      ANESTHESIA (see MAR for exact dosages):      Anesthesia method:  Local infiltration    Local anesthetic:  Lidocaine 1% WITH epi  LACERATION DETAILS     Location:  Face    Face location:  L eyebrow    Length (cm):  3.5    REPAIR TYPE:     Repair type:  Complex    EXPLORATION:     Wound extended: debridement of non-viable tissue from upper lip of wound..      Hemostasis achieved with:  Epinephrine    Wound exploration: wound explored through full range of motion      Wound extent comment:  No subgaleal injury    TREATMENT:     Area cleansed with:  Saline    Amount of cleaning:  Extensive    Irrigation solution:  Sterile saline    Irrigation method:  Syringe    Debridement:  Minimal    Undermining:  None    SUBCUTANEOUS REPAIR     Suture size:  5-0    Suture material:  Fast-absorbing gut    Suture technique:  Simple interrupted    Number of sutures:  2    SKIN REPAIR     Repair method:  Sutures    Suture size:  5-0    Suture material:  Fast-absorbing gut    Suture technique:  Simple interrupted    Number of sutures:  7    APPROXIMATION     Approximation:  Close    POST-PROCEDURE DETAILS     Dressing:  Open (no dressing)      PROCEDURE    Patient Tolerance:  Patient tolerated the procedure well with no immediate complications                EKG Interpretation:      Interpreted by LUIS QUIROZ MD  Time reviewed: 0935  Symptoms at time of EKG: Syncope   Rhythm: normal sinus   Rate: normal  Axis: normal  Ectopy: none  Conduction: normal  ST Segments/ T Waves: No ST-T wave changes  Q Waves: none  Comparison to prior: Unchanged    Clinical Impression: normal EKG           Results for orders placed or performed during the hospital encounter of 10/23/23 (from the past 24 hour(s))   CBC with platelets   Result Value Ref Range    WBC Count 6.0 4.0 - 11.0 10e3/uL    RBC Count 4.45 3.80 - 5.20 10e6/uL    Hemoglobin 12.8 11.7 - 15.7 g/dL    Hematocrit 37.6 35.0 - 47.0 %    MCV 85 78 - 100 fL    MCH 28.8 26.5 - 33.0 pg    MCHC 34.0 31.5 - 36.5 g/dL    RDW 13.8 10.0 -  15.0 %    Platelet Count 236 150 - 450 10e3/uL   Basic metabolic panel   Result Value Ref Range    Sodium 136 135 - 145 mmol/L    Potassium 4.1 3.4 - 5.3 mmol/L    Chloride 102 98 - 107 mmol/L    Carbon Dioxide (CO2) 23 22 - 29 mmol/L    Anion Gap 11 7 - 15 mmol/L    Urea Nitrogen 23.4 (H) 8.0 - 23.0 mg/dL    Creatinine 1.20 (H) 0.51 - 0.95 mg/dL    GFR Estimate 49 (L) >60 mL/min/1.73m2    Calcium 9.5 8.8 - 10.2 mg/dL    Glucose 141 (H) 70 - 99 mg/dL   Troponin T, High Sensitivity   Result Value Ref Range    Troponin T, High Sensitivity 7 <=14 ng/L   Extra Tube    Narrative    The following orders were created for panel order Extra Tube.  Procedure                               Abnormality         Status                     ---------                               -----------         ------                     Extra Blue Top Tube[[id number]]                              Final result               Extra Red Top Tube[[id number]]                               Final result               Extra Heparinized Syringe[[id number]]                        Final result                 Please view results for these tests on the individual orders.   Extra Blue Top Tube   Result Value Ref Range    Hold Specimen JIC    Extra Red Top Tube   Result Value Ref Range    Hold Specimen JIC    Extra Heparinized Syringe   Result Value Ref Range    Hold Specimen OK    Head CT w/o contrast    Narrative    PROCEDURE: CT HEAD W/O CONTRAST 10/23/2023 9:58 AM    HISTORY: Fall, head injury. Abrasion above left eye.    COMPARISONS: None.    Meds/Dose Given: None.    TECHNIQUE: Axial noncontrast enhanced images with coronal and sagittal  reformatted images.    FINDINGS: Ventricles, sulci and basilar cisterns are normal in size  for patient of this age. No mass or midline shift is seen and there is  no extra-axial fluid collection or acute hemorrhage. Gray-white  differentiation is preserved.    Bone windows show no fracture. There is a laceration above  the left  orbit.    There is some mild mucoperiosteal thickening or fluid in left ethmoid  air cells. Paranasal sinuses are otherwise clear.         Impression    IMPRESSION: No intracranial mass effect or hemorrhage.    Left frontal skin laceration.    MORAIMA PFEIFFER MD         SYSTEM ID:  [id number]   CT Cervical Spine w/o Contrast    Narrative    PROCEDURE: CT CERVICAL SPINE W/O CONTRAST 10/23/2023 9:58 AM    HISTORY: Fall; Headache; Trauma, acute/subacute, without suspected  cervical artery trauma    COMPARISONS: None.    Meds/Dose Given: None.    TECHNIQUE: Axial noncontrast enhanced images with coronal and sagittal  reformatted images.    FINDINGS: No fracture is seen. There is no prevertebral soft tissue  swelling. There is some straightening of the normal cervical lordosis  with mild anterolisthesis of C4 on C5.    There is multilevel degenerative disc disease. This is most severe at  the C5-6 level where there is significant disc space narrowing,  subchondral sclerosis and anterior and posterior spur formation. There  is multilevel facet degenerative change throughout the mid and lower  cervical spine. This is worse on the right than on the left. There is  multilevel neural foraminal narrowing with probable exiting nerve root  impingement. This is most severe on the right at the C5-6 level.         Impression    IMPRESSION: No fracture.    Multilevel degenerative change.    MORAIMA PFEIFFER MD         SYSTEM ID:  [id number]       Medications   lidocaine 1% with EPINEPHrine 1:100,000 injection 5 mL (has no administration in time range)   acetaminophen (TYLENOL) tablet 975 mg (975 mg Oral $Given 10/23/23 0936)       Assessments & Plan (with Medical Decision Making)     I have reviewed the nursing notes.    68yoF presenting with head injury after a syncopal event. No prodromal symptoms but sounds like possible drop syncope. Not SOB. Plan on CT Head/neck to rule out trauma. No focal deficits to suggest stroke.  EKG is reassuring. Plan on labs, monitor here, will discuss option for observation given the patients age and hx regarding the syncopal event. No hx of seizures and no tongue laceration or other stigmata of a recent seizure. Disposition still pending at this time.    I have reviewed the findings, diagnosis, plan and need for follow up with the patient.    New Prescriptions    No medications on file       Final diagnoses:   Syncope, unspecified syncope type   Injury of head, initial encounter   Facial laceration, initial encounter       10/23/2023   [facility]       Koffi Barton MD  10/23/23 [id number]

## 2023-10-23 NOTE — DISCHARGE INSTRUCTIONS
- Please take Tylenol and Ibuprofen as needed for pain.  - Please return to the Emergency Room if you do not improve, feel worse, or have any new or concerning symptoms. We would especially want to see you back if you experience recurrent lightheadedness or syncope.  - Please follow up with a primary care physician in 3-4 days to discuss further testing or treatment.  - Please have the stitches removed in 5-6 days.

## 2023-10-23 NOTE — ED TRIAGE NOTES
Patient presents with c/o getting out of bed, was walking to bathroom, lost consciousness, fell and hit left side of head on ceramic bathroom floor. Patient unsure how long she was out for. Denies any blood thinners. Patient reports nausea and headache. Patient has laceration above left eye, active bleeding controlled at this time. Occurred around 0820.

## 2023-10-24 ENCOUNTER — HOSPITAL ENCOUNTER (OUTPATIENT)
Dept: CARDIOLOGY | Facility: HOSPITAL | Age: 68
Discharge: HOME | End: 2023-10-24
Attending: STUDENT IN AN ORGANIZED HEALTH CARE EDUCATION/TRAINING PROGRAM | Admitting: INTERNAL MEDICINE
Payer: COMMERCIAL

## 2023-10-24 DIAGNOSIS — R55 SYNCOPE, UNSPECIFIED SYNCOPE TYPE: ICD-10-CM

## 2023-10-24 PROCEDURE — 93242 EXT ECG>48HR<7D RECORDING: CPT

## 2023-10-24 PROCEDURE — 93244 EXT ECG>48HR<7D REV&INTERPJ: CPT | Performed by: INTERNAL MEDICINE

## 2023-10-24 NOTE — PATIENT INSTRUCTIONS
Zio patch placed on patient in outpatient appointment today. Patient was instructed to wear patch for 3 days. Skin was prepped and patch was placed following [employer] guidelines.     Patient was instructed to push button when symptomatic and fill out diary. Patient was instructed not to submerse in water and no swimming, saunas, or hot tubs. Showers may be taken keeping back towards the water. If the area DOES become wet pat it dry with a cloth. If skin irritation occurs patient was instructed to remove patch and contact [employer].    Patient understands we do not receive results until they mail patch back inside the box. Patient was made aware phone number is required for enrollment which automatically enrolls patient into text messaging program and they may receive automated phone calls. Patient can opt out at anytime. Staff reminded patient of outside billing notice which was explained to patient during the scheduling process of this monitor. Patient also instructed to contact [employer] with any questions [phone].    Patient had no further questions and agreed with plan. Patient was sent home with the box, information pamphlet and booklet to kim any incidents in.

## 2023-10-26 LAB
ATRIAL RATE - MUSE: 64 BPM
DIASTOLIC BLOOD PRESSURE - MUSE: NORMAL MMHG
INTERPRETATION ECG - MUSE: NORMAL
P AXIS - MUSE: 60 DEGREES
PR INTERVAL - MUSE: 180 MS
QRS DURATION - MUSE: 78 MS
QT - MUSE: 436 MS
QTC - MUSE: 449 MS
R AXIS - MUSE: 41 DEGREES
SYSTOLIC BLOOD PRESSURE - MUSE: NORMAL MMHG
T AXIS - MUSE: 50 DEGREES
VENTRICULAR RATE- MUSE: 64 BPM

## 2023-10-28 ENCOUNTER — HOSPITAL ENCOUNTER (EMERGENCY)
Facility: HOSPITAL | Age: 68
Discharge: HOME | End: 2023-10-28
Admitting: NURSE PRACTITIONER
Payer: COMMERCIAL

## 2023-10-28 VITALS
BODY MASS INDEX: 28.88 KG/M2 | SYSTOLIC BLOOD PRESSURE: 136 MMHG | WEIGHT: 163 LBS | OXYGEN SATURATION: 96 % | TEMPERATURE: 97.9 F | HEIGHT: 63 IN | RESPIRATION RATE: 18 BRPM | DIASTOLIC BLOOD PRESSURE: 79 MMHG | HEART RATE: 82 BPM

## 2023-10-28 PROCEDURE — 999N000104 HC STATISTIC NO CHARGE

## 2023-10-28 NOTE — ED NOTES
Upon viewing suture site, no sutures visualized. After viewing encounter from 10/23/23 it was seen that dissolvable sutures were placed.

## 2023-10-28 NOTE — ED TRIAGE NOTES
Pt presents with request for suture removal. 7 sutures placed on 10/23/23. Was told to have sutures removed in 5-6 days.

## 2023-10-30 ENCOUNTER — OFFICE VISIT (OUTPATIENT)
Dept: FAMILY MEDICINE | Facility: OTHER | Age: 68
End: 2023-10-30
Attending: FAMILY MEDICINE
Payer: COMMERCIAL

## 2023-10-30 VITALS
HEART RATE: 92 BPM | WEIGHT: 167 LBS | BODY MASS INDEX: 29.58 KG/M2 | DIASTOLIC BLOOD PRESSURE: 68 MMHG | TEMPERATURE: 98.1 F | SYSTOLIC BLOOD PRESSURE: 107 MMHG | OXYGEN SATURATION: 95 %

## 2023-10-30 DIAGNOSIS — R55 SYNCOPE, UNSPECIFIED SYNCOPE TYPE: Primary | ICD-10-CM

## 2023-10-30 PROBLEM — Z96.651 HISTORY OF REVISION OF TOTAL REPLACEMENT OF RIGHT KNEE JOINT: Status: ACTIVE | Noted: 2023-03-29

## 2023-10-30 PROCEDURE — G0463 HOSPITAL OUTPT CLINIC VISIT: HCPCS

## 2023-10-30 PROCEDURE — 99213 OFFICE O/P EST LOW 20 MIN: CPT | Performed by: FAMILY MEDICINE

## 2023-10-30 NOTE — PROGRESS NOTES
Assessment & Plan     Syncope, unspecified syncope type  ZioPatch ordered by ER/UC - pending.  Will add on an echo to complete workup assuming both are normal.  Orthostatics today - HR increases by 20+ bpm, BP stable, asymptomatic.  - Echocardiogram Complete; Future    Follow-up if issues persist.  Suspect the     DEJA NORIEGA DO  [facility] - MICHELA Zepeda is a 68 year old, presenting for the following health issues:  ER F/U        10/30/2023     1:58 PM   Additional Questions   Roomed by Henrietta Pineda   Accompanied by none         10/30/2023     1:58 PM   Patient Reported Additional Medications   Patient reports taking the following new medications none       HPI     69yo female with HLP, CKD, BMI 29.6 was seen in ER on 10/23/23 for syncope.  She passed out while walking to bathroom after waking up in the morning.  She reports no palpitations, chest pain, dizziness/lightheadedness, or any warning signs.  No bowel/bladder incontinence.  No previous.  Unwitnessed event as  was out in woods, she called him to come back to take her to ER.    She does recall that she usually takes her meds at night with a large glass of water, but forgot her meds/water the night before passing out that morning.  She's on atorvastatin and Celexa.    Sutures already removed.    ED/UC Followup:    Facility:  [facility] [place]  Date of visit: 10/23  Reason for visit: Fall/Laceration  Current Status: pt states is doing good, mailed heart monitor in        Review of Systems   Constitutional:  Negative for fatigue and fever.   Respiratory:  Negative for shortness of breath.    Cardiovascular:  Negative for chest pain, palpitations and peripheral edema.   Neurological:  Negative for headaches.            Objective    /68 (BP Location: Left arm, Patient Position: Sitting, Cuff Size: Adult Regular)   Pulse 92   Temp 98.1  F (36.7  C) (Tympanic)   Wt 75.8 kg (167 lb)   SpO2 95%   BMI 29.58 kg/m    Body mass  index is 29.58 kg/m .  Physical Exam  Constitutional:       General: She is not in acute distress.     Appearance: Normal appearance.   HENT:      Head:      Comments: Forehead laceration healing - left eyebrow area     Right Ear: Tympanic membrane normal.      Left Ear: Tympanic membrane normal.   Eyes:      Extraocular Movements: Extraocular movements intact.      Conjunctiva/sclera: Conjunctivae normal.      Pupils: Pupils are equal, round, and reactive to light.   Neck:      Vascular: No carotid bruit.   Cardiovascular:      Rate and Rhythm: Normal rate and regular rhythm.      Heart sounds: Normal heart sounds. No murmur heard.  Pulmonary:      Effort: Pulmonary effort is normal.      Breath sounds: Normal breath sounds. No wheezing.   Musculoskeletal:      Cervical back: Normal range of motion.      Comments: No C-T-L spine midline tenderness   Lymphadenopathy:      Cervical: No cervical adenopathy.   Neurological:      Mental Status: She is alert and oriented to person, place, and time.

## 2023-11-03 ENCOUNTER — HOSPITAL ENCOUNTER (OUTPATIENT)
Dept: CARDIOLOGY | Facility: HOSPITAL | Age: 68
Discharge: HOME | End: 2023-11-03
Attending: FAMILY MEDICINE | Admitting: INTERNAL MEDICINE
Payer: COMMERCIAL

## 2023-11-03 DIAGNOSIS — R55 SYNCOPE, UNSPECIFIED SYNCOPE TYPE: ICD-10-CM

## 2023-11-03 LAB — LVEF ECHO: NORMAL

## 2023-11-03 PROCEDURE — 93306 TTE W/DOPPLER COMPLETE: CPT | Mod: 26 | Performed by: INTERNAL MEDICINE

## 2023-11-03 PROCEDURE — 93306 TTE W/DOPPLER COMPLETE: CPT

## 2023-11-05 ASSESSMENT — ENCOUNTER SYMPTOMS
FATIGUE: 0
FEVER: 0
HEADACHES: 0
PALPITATIONS: 0
SHORTNESS OF BREATH: 0

## 2023-11-07 DIAGNOSIS — Z12.31 VISIT FOR SCREENING MAMMOGRAM: Primary | ICD-10-CM

## 2023-12-20 ENCOUNTER — MYC MEDICAL ADVICE (OUTPATIENT)
Dept: FAMILY MEDICINE | Facility: OTHER | Age: 68
End: 2023-12-20

## 2023-12-20 ASSESSMENT — ENCOUNTER SYMPTOMS
WEAKNESS: 0
CONSTIPATION: 0
MYALGIAS: 0
SORE THROAT: 0
HEMATURIA: 0
ABDOMINAL PAIN: 0
COUGH: 1
FREQUENCY: 0
FEVER: 0
NAUSEA: 0
JOINT SWELLING: 0
BREAST MASS: 0
PALPITATIONS: 0
DIARRHEA: 0
DYSURIA: 0
HEMATOCHEZIA: 0
CHILLS: 0
PARESTHESIAS: 0
HEADACHES: 0
NERVOUS/ANXIOUS: 0
EYE PAIN: 0
DIZZINESS: 1
HEARTBURN: 0
ARTHRALGIAS: 0
SHORTNESS OF BREATH: 0

## 2023-12-20 ASSESSMENT — ACTIVITIES OF DAILY LIVING (ADL): CURRENT_FUNCTION: NO ASSISTANCE NEEDED

## 2023-12-27 ENCOUNTER — LAB (OUTPATIENT)
Dept: LAB | Facility: OTHER | Age: 68
End: 2023-12-27
Payer: COMMERCIAL

## 2023-12-27 ENCOUNTER — OFFICE VISIT (OUTPATIENT)
Dept: FAMILY MEDICINE | Facility: OTHER | Age: 68
End: 2023-12-27
Attending: FAMILY MEDICINE
Payer: COMMERCIAL

## 2023-12-27 VITALS
RESPIRATION RATE: 17 BRPM | OXYGEN SATURATION: 98 % | HEART RATE: 71 BPM | WEIGHT: 168.6 LBS | TEMPERATURE: 96.5 F | BODY MASS INDEX: 29.88 KG/M2 | SYSTOLIC BLOOD PRESSURE: 102 MMHG | HEIGHT: 63 IN | DIASTOLIC BLOOD PRESSURE: 68 MMHG

## 2023-12-27 DIAGNOSIS — Z12.31 ENCOUNTER FOR SCREENING MAMMOGRAM FOR BREAST CANCER: ICD-10-CM

## 2023-12-27 DIAGNOSIS — N18.31 CHRONIC KIDNEY DISEASE, STAGE 3A (H): ICD-10-CM

## 2023-12-27 DIAGNOSIS — I95.1 ORTHOSTASIS: ICD-10-CM

## 2023-12-27 DIAGNOSIS — E78.5 DYSLIPIDEMIA: ICD-10-CM

## 2023-12-27 DIAGNOSIS — R73.03 PREDIABETES: ICD-10-CM

## 2023-12-27 DIAGNOSIS — F43.23 ADJUSTMENT DISORDER WITH MIXED ANXIETY AND DEPRESSED MOOD: ICD-10-CM

## 2023-12-27 DIAGNOSIS — R09.82 POST-NASAL DRIP: ICD-10-CM

## 2023-12-27 DIAGNOSIS — E78.5 DYSLIPIDEMIA: Primary | ICD-10-CM

## 2023-12-27 LAB
ALBUMIN SERPL BCG-MCNC: 4 G/DL (ref 3.5–5.2)
ALP SERPL-CCNC: 89 U/L (ref 40–150)
ALT SERPL W P-5'-P-CCNC: 18 U/L (ref 0–50)
ANION GAP SERPL CALCULATED.3IONS-SCNC: 14 MMOL/L (ref 7–15)
AST SERPL W P-5'-P-CCNC: 24 U/L (ref 0–45)
BASOPHILS # BLD AUTO: 0.1 10E3/UL (ref 0–0.2)
BASOPHILS NFR BLD AUTO: 1 %
BILIRUB SERPL-MCNC: 0.4 MG/DL
BUN SERPL-MCNC: 17.3 MG/DL (ref 8–23)
CALCIUM SERPL-MCNC: 9 MG/DL (ref 8.8–10.2)
CHLORIDE SERPL-SCNC: 101 MMOL/L (ref 98–107)
CHOLEST SERPL-MCNC: 157 MG/DL
CREAT SERPL-MCNC: 1.11 MG/DL (ref 0.51–0.95)
CREAT UR-MCNC: 18.9 MG/DL
DEPRECATED HCO3 PLAS-SCNC: 21 MMOL/L (ref 22–29)
EGFRCR SERPLBLD CKD-EPI 2021: 54 ML/MIN/1.73M2
EOSINOPHIL # BLD AUTO: 0.3 10E3/UL (ref 0–0.7)
EOSINOPHIL NFR BLD AUTO: 5 %
ERYTHROCYTE [DISTWIDTH] IN BLOOD BY AUTOMATED COUNT: 13.3 % (ref 10–15)
EST. AVERAGE GLUCOSE BLD GHB EST-MCNC: 123 MG/DL
FASTING STATUS PATIENT QL REPORTED: NO
GLUCOSE SERPL-MCNC: 121 MG/DL (ref 70–99)
HBA1C MFR BLD: 5.9 %
HCT VFR BLD AUTO: 36.6 % (ref 35–47)
HDLC SERPL-MCNC: 52 MG/DL
HGB BLD-MCNC: 12.7 G/DL (ref 11.7–15.7)
IMM GRANULOCYTES # BLD: 0 10E3/UL
IMM GRANULOCYTES NFR BLD: 0 %
LDLC SERPL CALC-MCNC: 79 MG/DL
LYMPHOCYTES # BLD AUTO: 2.1 10E3/UL (ref 0.8–5.3)
LYMPHOCYTES NFR BLD AUTO: 35 %
MCH RBC QN AUTO: 29.3 PG (ref 26.5–33)
MCHC RBC AUTO-ENTMCNC: 34.7 G/DL (ref 31.5–36.5)
MCV RBC AUTO: 85 FL (ref 78–100)
MICROALBUMIN UR-MCNC: <12 MG/L
MICROALBUMIN/CREAT UR: NORMAL MG/G{CREAT}
MONOCYTES # BLD AUTO: 0.5 10E3/UL (ref 0–1.3)
MONOCYTES NFR BLD AUTO: 8 %
NEUTROPHILS # BLD AUTO: 3 10E3/UL (ref 1.6–8.3)
NEUTROPHILS NFR BLD AUTO: 51 %
NONHDLC SERPL-MCNC: 105 MG/DL
NRBC # BLD AUTO: 0 10E3/UL
NRBC BLD AUTO-RTO: 0 /100
PLATELET # BLD AUTO: 236 10E3/UL (ref 150–450)
POTASSIUM SERPL-SCNC: 4.1 MMOL/L (ref 3.4–5.3)
PROT SERPL-MCNC: 7.3 G/DL (ref 6.4–8.3)
RBC # BLD AUTO: 4.33 10E6/UL (ref 3.8–5.2)
SODIUM SERPL-SCNC: 136 MMOL/L (ref 135–145)
TRIGL SERPL-MCNC: 130 MG/DL
TSH SERPL DL<=0.005 MIU/L-ACNC: 2.38 UIU/ML (ref 0.3–4.2)
WBC # BLD AUTO: 6 10E3/UL (ref 4–11)

## 2023-12-27 PROCEDURE — G0463 HOSPITAL OUTPT CLINIC VISIT: HCPCS

## 2023-12-27 PROCEDURE — 80061 LIPID PANEL: CPT | Mod: ZL

## 2023-12-27 PROCEDURE — 80053 COMPREHEN METABOLIC PANEL: CPT | Mod: ZL

## 2023-12-27 PROCEDURE — 84443 ASSAY THYROID STIM HORMONE: CPT | Mod: ZL

## 2023-12-27 PROCEDURE — 36415 COLL VENOUS BLD VENIPUNCTURE: CPT | Mod: ZL

## 2023-12-27 PROCEDURE — 99214 OFFICE O/P EST MOD 30 MIN: CPT | Performed by: FAMILY MEDICINE

## 2023-12-27 PROCEDURE — 85025 COMPLETE CBC W/AUTO DIFF WBC: CPT | Mod: ZL

## 2023-12-27 PROCEDURE — 83036 HEMOGLOBIN GLYCOSYLATED A1C: CPT | Mod: ZL

## 2023-12-27 PROCEDURE — 82043 UR ALBUMIN QUANTITATIVE: CPT | Mod: ZL

## 2023-12-27 RX ORDER — ATORVASTATIN CALCIUM 10 MG/1
10 TABLET, FILM COATED ORAL DAILY
Qty: 90 TABLET | Refills: 3 | Status: SHIPPED | OUTPATIENT
Start: 2023-12-27 | End: 2024-01-16

## 2023-12-27 RX ORDER — CITALOPRAM HYDROBROMIDE 10 MG/1
10 TABLET ORAL DAILY
Qty: 90 TABLET | Refills: 3 | Status: SHIPPED | OUTPATIENT
Start: 2023-12-27 | End: 2024-02-16

## 2023-12-27 ASSESSMENT — ENCOUNTER SYMPTOMS
HEMATOCHEZIA: 0
FREQUENCY: 0
EYE PAIN: 0
JOINT SWELLING: 0
DYSURIA: 0
HEMATURIA: 0
NAUSEA: 0
CHILLS: 0
ABDOMINAL PAIN: 0
DIARRHEA: 0
SHORTNESS OF BREATH: 0
HEADACHES: 0
BREAST MASS: 0
PARESTHESIAS: 0
HEARTBURN: 0
COUGH: 1
NERVOUS/ANXIOUS: 1
FEVER: 0
DIZZINESS: 1
SORE THROAT: 0
MYALGIAS: 0
ARTHRALGIAS: 0
CONSTIPATION: 0
WEAKNESS: 0
PALPITATIONS: 0

## 2023-12-27 ASSESSMENT — PATIENT HEALTH QUESTIONNAIRE - PHQ9
SUM OF ALL RESPONSES TO PHQ QUESTIONS 1-9: 2
SUM OF ALL RESPONSES TO PHQ QUESTIONS 1-9: 2
10. IF YOU CHECKED OFF ANY PROBLEMS, HOW DIFFICULT HAVE THESE PROBLEMS MADE IT FOR YOU TO DO YOUR WORK, TAKE CARE OF THINGS AT HOME, OR GET ALONG WITH OTHER PEOPLE: NOT DIFFICULT AT ALL

## 2023-12-27 ASSESSMENT — ACTIVITIES OF DAILY LIVING (ADL): CURRENT_FUNCTION: NO ASSISTANCE NEEDED

## 2023-12-27 ASSESSMENT — PAIN SCALES - GENERAL: PAINLEVEL: NO PAIN (0)

## 2023-12-27 NOTE — PROGRESS NOTES
Subjective Finding:    Chief compalint: Patient presents with:  Back Pain  , Pain Scale: 5/10, Intensity: sharp, Duration: 1 weeks, Change since last visit: , Radiating: no.    Date of injury:     Activities that the pain restricts:   Home/household activities: no.  Work duties: no.  Hobbies/social: no.  Sleep: no.  Makes symptoms better: rest.  Makes symptoms worse: activity, cervical extension and cervical flexion.  Have you seen anyone else for the symptoms? No.  Work related: no.  Automobile related injury: no.    Objective and Assessment:    Posture Analysis:   High shoulder: right.  Head tilt: .  High iliac crest: .  Head carriage: forward.  Thoracic Kyphosis: neutral.  Lumbar Lordosis: neutral.    Lumbar Range of Motion: flexion decreased.  Cervical Range of Motion: flexion decreased.  Thoracic Range of Motion: .  Extremity Range of Motion: .    Palpation:   C paraspine tightness with restricted ROM      Segmental dysfunction pre-treatment: T5   C7    L45    Assessment post-treatment:  Cervical: ROM increased and pain and tenderness decreased.  Thoracic: ROM increased.  Lumbar: ROM increased.    Comments: .      Complicating Factors: .    Plan / Procedure:    Expected release date: .  Treatment plan: PRN.  Instructed patient: ice 20 minutes every other hour as needed.  Short term goals: reduce pain.  Long term goals: restore normal function.  Prognosis: excellent.                                Resident

## 2023-12-27 NOTE — PROGRESS NOTES
"   SUBJECTIVE:   Katelyn is a 68 year old, presenting for the following:  Physical, Anxiety, Depression, and Lipids        12/27/2023     9:57 AM   Additional Questions   Roomed by KERRY Austin   Accompanied by self       Healthy Habits:     In general, how would you rate your overall health?  Excellent    Frequency of exercise:  2-3 days/week    Duration of exercise:  Less than 15 minutes    Do you usually eat at least 4 servings of fruit and vegetables a day, include whole grains    & fiber and avoid regularly eating high fat or \"junk\" foods?  No    Taking medications regularly:  Yes    Ability to successfully perform activities of daily living:  No assistance needed    Home Safety:  Throw rugs in the hallway    Hearing Impairment:  No hearing concerns    In the past 6 months, have you been bothered by leaking of urine? Yes    In general, how would you rate your overall mental or emotional health?  Good    Additional concerns today:  Yes    Dizziness - positional and very transient  Weight - several pounds   Sinus drainage-mild and decrease taste          Hyperlipidemia Follow-Up    Are you regularly taking any medication or supplement to lower your cholesterol?   Yes- atorvastatin   Are you having muscle aches or other side effects that you think could be caused by your cholesterol lowering medication?  No    Depression and Anxiety Follow-Up  How are you doing with your depression since your last visit? Improved   How are you doing with your anxiety since your last visit?  No change  Are you having other symptoms that might be associated with depression or anxiety? No  Have you had a significant life event? No   Do you have any concerns with your use of alcohol or other drugs? No    Social History     Tobacco Use    Smoking status: Never     Passive exposure: Never    Smokeless tobacco: Never   Vaping Use    Vaping Use: Never used   Substance Use Topics    Alcohol use: Yes     Comment: Occasionally never daily    " Drug use: Never         5/5/2021     1:00 PM 11/23/2021     2:00 PM 12/27/2023     9:48 AM   PHQ   PHQ-9 Total Score 2 0 2   Q9: Thoughts of better off dead/self-harm past 2 weeks Not at all Not at all Not at all         10/26/2020     2:00 PM 5/5/2021     1:00 PM 11/23/2021     2:00 PM   PENELOPE-7 SCORE   Total Score 0 0 1         12/27/2023     9:48 AM   Last PHQ-9   1.  Little interest or pleasure in doing things 0   2.  Feeling down, depressed, or hopeless 0   3.  Trouble falling or staying asleep, or sleeping too much 0   4.  Feeling tired or having little energy 1   5.  Poor appetite or overeating 1   6.  Feeling bad about yourself 0   7.  Trouble concentrating 0   8.  Moving slowly or restless 0   Q9: Thoughts of better off dead/self-harm past 2 weeks 0   PHQ-9 Total Score 2         11/23/2021     2:00 PM   PENELOPE-7    1. Feeling nervous, anxious, or on edge 0   2. Not being able to stop or control worrying 0   3. Worrying too much about different things 0   4. Trouble relaxing 0   5. Being so restless that it is hard to sit still 0   6. Becoming easily annoyed or irritable 1   7. Feeling afraid, as if something awful might happen 0   PENELOPE-7 Total Score 1   If you checked any problems, how difficult have they made it for you to do your work, take care of things at home, or get along with other people? Not difficult at all       Suicide Assessment Five-step Evaluation and Treatment (SAFE-T)    Chronic Kidney Disease Follow-up    Do you take any over the counter pain medicine?: Yes  What over the counter medicine are you taking for your pain?:  ibuprofen     How often do you take this medicine?:   rarely       Social History     Tobacco Use    Smoking status: Never     Passive exposure: Never    Smokeless tobacco: Never   Substance Use Topics    Alcohol use: Yes     Comment: Occasionally never daily             12/20/2023     9:32 AM   Alcohol Use   Prescreen: >3 drinks/day or >7 drinks/week? No     Reviewed orders  with patient.  Reviewed health maintenance and updated orders accordingly - Yes  Labs reviewed in EPIC    Breast Cancer Screenin/22/2021     9:57 PM 2021    10:38 AM   Breast CA Risk Assessment (FHS-7)   Do you have a family history of breast, colon, or ovarian cancer? No / Unknown No / Unknown         Mammogram Screening: Recommended annual mammography  Pertinent mammograms are reviewed under the imaging tab.    History of abnormal Pap smear:       2014    12:00 AM   PAP / HPV   PAP (Historical) NIL      Reviewed and updated as needed this visit by clinical staff   Tobacco  Allergies  Meds              Reviewed and updated as needed this visit by Provider                 Past Medical History:   Diagnosis Date    Adjustment disorder with mixed anxiety and depressed mood 10/26/2020    Chronic kidney disease, stage 3a (H) 2021    Cyst of Bartholin's gland 10/22/2007    Depressive disorder 2010    First time on meds    Diabetes (H)     Prediabetes    Diverticulosis of large intestine without hemorrhage 10/23/2018    Dyslipidemia 05/15/2014    Endometrial hyperplasia without atypia, simple     Glucose intolerance (impaired glucose tolerance) 790.22    Hypercholesterolemia 2011    Lichen sclerosus 2013    Menopause 2013    Osteoarthrosis, unspecified whether generalized or localized, lower leg 2002    Pre-diabetes 2013    Reactive airway disease that is not asthma 2018    Replacing diagnoses that were inactivated after the 10/1/2021 regulatory import.    Special screening for malignant neoplasms, colon 10/15/2004    Uncomplicated asthma       Past Surgical History:   Procedure Laterality Date    ARTHROSCOPY KNEE Left     x 2    BIOPSY      For lichen sclerosis diagnosis    COLONOSCOPY  2004    COLONOSCOPY  2012    Dr Sanchez/ sigmoid diverticular dz    COLPORRHAPHY POSTERIOR N/A 2015    Procedure: COLPORRHAPHY POSTERIOR;  Surgeon:  Vasquez Sauer MD;  Location: [facility]    CYSTOSCOPY N/A 02/18/2015    Procedure: CYSTOSCOPY;  Surgeon: Vasquez Sauer MD;  Location: [facility]    DILATION AND CURETTAGE, HYSTEROSCOPY DIAGNOSTIC, COMBINED  05/07/2014    Procedure: COMBINED DILATION AND CURETTAGE, HYSTEROSCOPY DIAGNOSTIC;  Surgeon: Vasquez Sauer MD;  Location: [facility]    EYE SURGERY Left 08/2016    macular hole repair    HYSTERECTOMY VAGINAL, BILATERAL SALPINGO-OOPHERECTOMY, COMBINED N/A 02/18/2015    Procedure: COMBINED HYSTERECTOMY VAGINAL, SALPINGO-OOPHORECTOMY;  Surgeon: Vasquez Sauer MD;  Location: [facility]    PHACOEMULSIFICATION WITH STANDARD INTRAOCULAR LENS IMPLANT Left 04/24/2018    Procedure: PHACOEMULSIFICATION WITH STANDARD INTRAOCULAR LENS IMPLANT;  PHACOEMULSIFICATION CATARACT EXTRACTION POSTERIOR CHAMBER LENS LEFT/TECNIS-TORIC, 10% CORINA;  Surgeon: Jerome Snow MD;  Location: [facility]    REPLACEMENT TOTAL KNEE Left 2004    SLING TRANSOBTURATOR N/A 02/18/2015    Procedure: SLING TRANSOBTURATOR;  Surgeon: Vasquez Sauer MD;  Location: [facility]       Review of Systems   Constitutional:  Negative for chills and fever.   HENT:  Positive for congestion. Negative for ear pain, hearing loss and sore throat.    Eyes:  Negative for pain and visual disturbance.   Respiratory:  Positive for cough. Negative for shortness of breath.    Cardiovascular:  Negative for chest pain, palpitations and peripheral edema.   Gastrointestinal:  Negative for abdominal pain, constipation, diarrhea, heartburn, hematochezia and nausea.   Breasts:  Negative for tenderness, breast mass and discharge.   Genitourinary:  Positive for urgency. Negative for dysuria, frequency, genital sores, hematuria, pelvic pain, vaginal bleeding and vaginal discharge.   Musculoskeletal:  Negative for arthralgias, joint swelling and myalgias.   Skin:  Negative for rash.   Neurological:  Positive for dizziness. Negative for weakness, headaches and paresthesias.   Psychiatric/Behavioral:  Positive for  "mood changes. The patient is nervous/anxious.      CONSTITUTIONAL: NEGATIVE for fever, chills, change in weight  INTEGUMENTARY/SKIN: NEGATIVE for worrisome rashes, moles or lesions  EYES: NEGATIVE for vision changes or irritation  ENT: NEGATIVE for ear, mouth and throat problems  RESP: NEGATIVE for significant cough or SOB  BREAST: NEGATIVE for masses, tenderness or discharge  CV: NEGATIVE for chest pain, palpitations or peripheral edema  GI: NEGATIVE for nausea, abdominal pain, heartburn, or change in bowel habits  : NEGATIVE for unusual urinary or vaginal symptoms. No vaginal bleeding.  MUSCULOSKELETAL: NEGATIVE for significant arthralgias or myalgia  NEURO: NEGATIVE for weakness, dizziness or paresthesias  PSYCHIATRIC: NEGATIVE for changes in mood or affect      OBJECTIVE:   /68 (BP Location: Left arm, Patient Position: Sitting, Cuff Size: Adult Large)   Pulse 71   Temp (!) 96.5  F (35.8  C) (Tympanic)   Resp 17   Ht 1.6 m (5' 3\")   Wt 76.5 kg (168 lb 9.6 oz)   SpO2 98%   BMI 29.87 kg/m    Physical Exam  GENERAL: healthy, alert and no distress  EYES: Eyes grossly normal to inspection, PERRL and conjunctivae and sclerae normal  HENT: ear canals and TM's normal, nose and mouth without ulcers or lesions  NECK: no adenopathy, no asymmetry, masses, or scars and thyroid normal to palpation  RESP: lungs clear to auscultation - no rales, rhonchi or wheezes  BREAST: normal without masses, tenderness or nipple discharge and no palpable axillary masses or adenopathy  CV: regular rate and rhythm, normal S1 S2, no S3 or S4, no murmur, click or rub, no peripheral edema and peripheral pulses strong  ABDOMEN: soft, nontender, no hepatosplenomegaly, no masses and bowel sounds normal  MS: no gross musculoskeletal defects noted, no edema  SKIN: no suspicious lesions or rashes  NEURO: Normal strength and tone, mentation intact and speech normal  PSYCH: mentation appears normal, affect normal/bright  LYMPH: no " cervical, supraclavicular, axillary, or inguinal adenopathy    Results for orders placed or performed in visit on 12/27/23   TSH     Status: Normal   Result Value Ref Range    TSH 2.38 0.30 - 4.20 uIU/mL   Hemoglobin A1c     Status: Abnormal   Result Value Ref Range    Estimated Average Glucose 123 mg/dL    Hemoglobin A1C 5.9 (H) <5.7 %   Lipid Profile     Status: None   Result Value Ref Range    Cholesterol 157 <200 mg/dL    Triglycerides 130 <150 mg/dL    Direct Measure HDL 52 >=50 mg/dL    LDL Cholesterol Calculated 79 <=100 mg/dL    Non HDL Cholesterol 105 <130 mg/dL    Patient Fasting > 8hrs? No     Narrative    Cholesterol  Desirable:  <200 mg/dL    Triglycerides  Normal:  Less than 150 mg/dL  Borderline High:  150-199 mg/dL  High:  200-499 mg/dL  Very High:  Greater than or equal to 500 mg/dL    Direct Measure HDL  Female:  Greater than or equal to 50 mg/dL   Male:  Greater than or equal to 40 mg/dL    LDL Cholesterol  Desirable:  <100mg/dL  Above Desirable:  100-129 mg/dL   Borderline High:  130-159 mg/dL   High:  160-189 mg/dL   Very High:  >= 190 mg/dL    Non HDL Cholesterol  Desirable:  130 mg/dL  Above Desirable:  130-159 mg/dL  Borderline High:  160-189 mg/dL  High:  190-219 mg/dL  Very High:  Greater than or equal to 220 mg/dL   Albumin Random Urine Quantitative with Creat Ratio     Status: None   Result Value Ref Range    Creatinine Urine mg/dL 18.9 mg/dL    Albumin Urine mg/L <12.0 mg/L    Albumin Urine mg/g Cr     Comprehensive metabolic panel     Status: Abnormal   Result Value Ref Range    Sodium 136 135 - 145 mmol/L    Potassium 4.1 3.4 - 5.3 mmol/L    Carbon Dioxide (CO2) 21 (L) 22 - 29 mmol/L    Anion Gap 14 7 - 15 mmol/L    Urea Nitrogen 17.3 8.0 - 23.0 mg/dL    Creatinine 1.11 (H) 0.51 - 0.95 mg/dL    GFR Estimate 54 (L) >60 mL/min/1.73m2    Calcium 9.0 8.8 - 10.2 mg/dL    Chloride 101 98 - 107 mmol/L    Glucose 121 (H) 70 - 99 mg/dL    Alkaline Phosphatase 89 40 - 150 U/L    AST 24 0 - 45  U/L    ALT 18 0 - 50 U/L    Protein Total 7.3 6.4 - 8.3 g/dL    Albumin 4.0 3.5 - 5.2 g/dL    Bilirubin Total 0.4 <=1.2 mg/dL   CBC with platelets and differential     Status: None   Result Value Ref Range    WBC Count 6.0 4.0 - 11.0 10e3/uL    RBC Count 4.33 3.80 - 5.20 10e6/uL    Hemoglobin 12.7 11.7 - 15.7 g/dL    Hematocrit 36.6 35.0 - 47.0 %    MCV 85 78 - 100 fL    MCH 29.3 26.5 - 33.0 pg    MCHC 34.7 31.5 - 36.5 g/dL    RDW 13.3 10.0 - 15.0 %    Platelet Count 236 150 - 450 10e3/uL    % Neutrophils 51 %    % Lymphocytes 35 %    % Monocytes 8 %    % Eosinophils 5 %    % Basophils 1 %    % Immature Granulocytes 0 %    NRBCs per 100 WBC 0 <1 /100    Absolute Neutrophils 3.0 1.6 - 8.3 10e3/uL    Absolute Lymphocytes 2.1 0.8 - 5.3 10e3/uL    Absolute Monocytes 0.5 0.0 - 1.3 10e3/uL    Absolute Eosinophils 0.3 0.0 - 0.7 10e3/uL    Absolute Basophils 0.1 0.0 - 0.2 10e3/uL    Absolute Immature Granulocytes 0.0 <=0.4 10e3/uL    Absolute NRBCs 0.0 10e3/uL   CBC with Platelets & Differential     Status: None    Narrative    The following orders were created for panel order CBC with Platelets & Differential.  Procedure                               Abnormality         Status                     ---------                               -----------         ------                     CBC with platelets and d...[[id number]]                      Final result                 Please view results for these tests on the individual orders.         ASSESSMENT/PLAN:   (E78.5) Dyslipidemia  (primary encounter diagnosis)  Comment: stable on meds. Continue current medications and behavioral changes.   Plan: CBC with Platelets & Differential,         Comprehensive metabolic panel, Albumin Random         Urine Quantitative with Creat Ratio, Lipid         Profile, Hemoglobin A1c, TSH, atorvastatin         (LIPITOR) 10 MG tablet            (R73.03) Prediabetes  Comment: pt is aware. Slight rise. Work on diet and exercise Plan: CBC with  "Platelets & Differential,         Comprehensive metabolic panel, Albumin Random         Urine Quantitative with Creat Ratio, Lipid         Profile, Hemoglobin A1c, TSH            (N18.31) Chronic kidney disease, stage 3a (H)  Comment: stable - reassurance given. Will watch Plan: CBC with Platelets & Differential,         Comprehensive metabolic panel, Albumin Random         Urine Quantitative with Creat Ratio, Lipid         Profile, Hemoglobin A1c, TSH            (F43.23) Adjustment disorder with mixed anxiety and depressed mood  Comment: stable with low dose meds. Needs long term. Continue current medications and behavioral changes.   Plan: CBC with Platelets & Differential,         Comprehensive metabolic panel, Albumin Random         Urine Quantitative with Creat Ratio, Lipid         Profile, Hemoglobin A1c, TSH, citalopram         (CELEXA) 10 MG tablet            (Z12.31) Encounter for screening mammogram for breast cancer  Comment: due soon  Plan: appt in march    (R09.82) Post-nasal drip  Comment: discussed.   Plan: can try otc antihist    (I95.1) Orthostasis  Comment: discussed.   Plan: possible try higher salt diet. Seems like bp issue and not so much of BPPV          COUNSELING:  Reviewed preventive health counseling, as reflected in patient instructions       Regular exercise       Healthy diet/nutrition      BMI:   Estimated body mass index is 29.87 kg/m  as calculated from the following:    Height as of this encounter: 1.6 m (5' 3\").    Weight as of this encounter: 76.5 kg (168 lb 9.6 oz).         She reports that she has never smoked. She has never been exposed to tobacco smoke. She has never used smokeless tobacco.          Robert Blakely MD  [facility] - [place]  "

## 2024-01-16 ENCOUNTER — MYC REFILL (OUTPATIENT)
Dept: FAMILY MEDICINE | Facility: OTHER | Age: 69
End: 2024-01-16

## 2024-01-16 DIAGNOSIS — E78.5 DYSLIPIDEMIA: ICD-10-CM

## 2024-01-17 RX ORDER — ATORVASTATIN CALCIUM 10 MG/1
10 TABLET, FILM COATED ORAL DAILY
Qty: 90 TABLET | Refills: 3 | Status: SHIPPED | OUTPATIENT
Start: 2024-01-17

## 2024-02-16 ENCOUNTER — MYC REFILL (OUTPATIENT)
Dept: FAMILY MEDICINE | Facility: OTHER | Age: 69
End: 2024-02-16

## 2024-02-16 DIAGNOSIS — F43.23 ADJUSTMENT DISORDER WITH MIXED ANXIETY AND DEPRESSED MOOD: ICD-10-CM

## 2024-02-18 ENCOUNTER — HEALTH MAINTENANCE LETTER (OUTPATIENT)
Age: 69
End: 2024-02-18

## 2024-02-19 RX ORDER — CITALOPRAM HYDROBROMIDE 10 MG/1
10 TABLET ORAL DAILY
Qty: 90 TABLET | Refills: 2 | Status: SHIPPED | OUTPATIENT
Start: 2024-02-19

## 2024-02-19 NOTE — TELEPHONE ENCOUNTER
CELEXA      Last Written Prescription Date:  12-27-23  Last Fill Quantity: 90,   # refills: 3  Last Office Visit: 12-27-23  Future Office visit:       Routing refill request to provider for review/approval because:  CHANGE OF PHARMACY

## 2024-03-21 ENCOUNTER — ANCILLARY PROCEDURE (OUTPATIENT)
Dept: MAMMOGRAPHY | Facility: OTHER | Age: 69
End: 2024-03-21
Attending: FAMILY MEDICINE
Payer: COMMERCIAL

## 2024-03-21 ENCOUNTER — TELEPHONE (OUTPATIENT)
Dept: MAMMOGRAPHY | Facility: OTHER | Age: 69
End: 2024-03-21

## 2024-03-21 DIAGNOSIS — Z12.31 VISIT FOR SCREENING MAMMOGRAM: ICD-10-CM

## 2024-03-21 PROCEDURE — 77063 BREAST TOMOSYNTHESIS BI: CPT | Mod: TC

## 2024-03-27 NOTE — PROGRESS NOTES
05/25/23 0500   Appointment Info   Signing clinician's name / credentials Ozzy Szymanski DPT   Visits Used 14 [employer]   Medical Diagnosis S/P RTKA   PT Tx Diagnosis S/P R TKA 3/28/23   Precautions/Limitations None   Progress Note/Certification   Onset of illness/injury or Date of Surgery 03/28/23   Therapy Frequency 2 x week   Predicted Duration 8 weels   Progress Note Due Date 05/11/23   Progress Note Completed Date 05/11/23       Present No   PT Goal 1   Goal Identifier LTG #1   Goal Description Pt. to be independent with correct performance of HEP to progress to independent home maagement of her condition.   Goal Progress MET   Target Date 05/26/23   Date Met 05/25/23   PT Goal 2   Goal Identifier LTG #2   Goal Description Pt. to report minimal symptom reproduction with daily and recreational activities to return to her PLOF.   Goal Progress MET   Target Date 05/26/23   Date Met 05/25/23   PT Goal 3   Goal Identifier LTG #3   Goal Description Pt. to impove R LE strength and ROM to WNL   Rationale to maximize safety and independence with performance of ADLs and functional tasks   Goal Progress MET   Target Date 05/26/23   Date Met 05/11/23   PT Goal 4   Goal Identifier STG #1   Goal Description Pt. to improve knee ROM by 50% to improve ease and comfort with daily activities.   Rationale to maximize safety and independence with performance of ADLs and functional tasks   Goal Progress MET   Target Date 04/28/23   Date Met 04/26/23   Subjective Report   Subjective Report Katelyn is doing very well today. She has improved her tolerance for daily activity. She is no longer bothered by pain with her preferred activities. She believes she is ready for independent management.   Objective Measure 1   Objective Measure Flexion   Details 124   Objective Measure 2   Objective Measure Extension   Details 0   Therapeutic Procedure/Exercise   Therapeutic Procedures: strength, endurance, ROM,  flexibility minutes (03687) 30   Ther Proc 1 R LE strengthening: Warm up   Ther Proc 1 - Details Recumbent Bike 5 minutes, Leg press on right 3 plates 3 x 10, leg extension 3 x 10, Leg curl 3 x 10 3 plates, LAst set 4 plates, forward step downs bottom stair 3 x 10, Sit to stand from standard height chair and Airex mat under left foot 3 x 10.   Skilled Intervention Pt. required proper grading of exercise resistance.   Patient Response/Progress No difficulty   PT Developmental Testing   Assessments Completed Katelyn is ready for independent management. Pain is minimal with daily and recreational activities. ROM restored to WNL   Education   Learner/Method Patient   Education Comments Discharge planning   Plan   Home program Exercises  - Sit to Stand Without Arm Support  - 2-3 x daily - 7 x weekly - 3 sets - 10 reps  - Forward Step Down Touch with Heel  - 2-3 x daily - 7 x weekly - 3 sets - 10 reps  - Hip Abduction with Resistance Loop  - 2-3 x daily - 7 x weekly - 3 sets - 10 reps  - Seated Hamstring Stretch  - 2-3 x daily - 7 x weekly - 3-5 reps - 30 sec hold   Plan for next session Independent management at this time.   Total Session Time   Timed Code Treatment Minutes 30   Total Treatment Time (sum of timed and untimed services) 30   Medicare Claim Information   Medical Diagnosis S/P RTKA   PT Diagnosis S/P R TKA 3/28/23   Start of Care Date 03/31/23   Onset date of current episode/exacerbation 03/28/23   Certification date from 03/31/23         DISCHARGE  Reason for Discharge: Patient has met all goals.  Patient chooses to discontinue therapy.    Equipment Issued: None    Discharge Plan: Patient to continue home program.    Referring Provider:  No ref. provider found

## 2024-05-06 ENCOUNTER — TRANSFERRED RECORDS (OUTPATIENT)
Dept: HEALTH INFORMATION MANAGEMENT | Facility: CLINIC | Age: 69
End: 2024-05-06

## 2024-05-15 ENCOUNTER — TELEPHONE (OUTPATIENT)
Dept: FAMILY MEDICINE | Facility: OTHER | Age: 69
End: 2024-05-15

## 2024-05-15 NOTE — TELEPHONE ENCOUNTER
Attempt # 1  Outcome: Left Message   Comment: Physical / Medicare Annual Wellness exam with Dr Blakely patient is due after 12/27/2024

## 2024-08-19 DIAGNOSIS — L90.0 LICHEN SCLEROSUS: ICD-10-CM

## 2024-08-19 NOTE — TELEPHONE ENCOUNTER
[employer]      Last Written Prescription Date:  3/20/23  Last Fill Quantity: 45g,   # refills: 3  Last Office Visit: 12/27/23  Future Office visit:       Routing refill request to provider for review/approval because:

## 2024-08-20 RX ORDER — MOMETASONE FUROATE 1 MG/G
OINTMENT TOPICAL
Qty: 45 G | Refills: 0 | Status: SHIPPED | OUTPATIENT
Start: 2024-08-20

## 2024-10-16 ENCOUNTER — TELEPHONE (OUTPATIENT)
Dept: FAMILY MEDICINE | Facility: OTHER | Age: 69
End: 2024-10-16

## 2024-10-16 ENCOUNTER — ANCILLARY PROCEDURE (OUTPATIENT)
Dept: GENERAL RADIOLOGY | Facility: OTHER | Age: 69
End: 2024-10-16
Attending: FAMILY MEDICINE
Payer: COMMERCIAL

## 2024-10-16 ENCOUNTER — OFFICE VISIT (OUTPATIENT)
Dept: FAMILY MEDICINE | Facility: OTHER | Age: 69
End: 2024-10-16
Attending: FAMILY MEDICINE
Payer: COMMERCIAL

## 2024-10-16 VITALS
TEMPERATURE: 97.9 F | HEIGHT: 63 IN | SYSTOLIC BLOOD PRESSURE: 122 MMHG | OXYGEN SATURATION: 98 % | HEART RATE: 74 BPM | WEIGHT: 169.5 LBS | BODY MASS INDEX: 30.03 KG/M2 | DIASTOLIC BLOOD PRESSURE: 82 MMHG

## 2024-10-16 DIAGNOSIS — R73.03 PREDIABETES: ICD-10-CM

## 2024-10-16 DIAGNOSIS — R07.9 CHEST PAIN, UNSPECIFIED TYPE: Primary | ICD-10-CM

## 2024-10-16 DIAGNOSIS — N18.31 CHRONIC KIDNEY DISEASE, STAGE 3A (H): ICD-10-CM

## 2024-10-16 DIAGNOSIS — J98.9 REACTIVE AIRWAY DISEASE WITHOUT ASTHMA: ICD-10-CM

## 2024-10-16 DIAGNOSIS — E78.5 DYSLIPIDEMIA: ICD-10-CM

## 2024-10-16 DIAGNOSIS — Z82.49 FH: CAD (CORONARY ARTERY DISEASE): ICD-10-CM

## 2024-10-16 DIAGNOSIS — R07.9 CHEST PAIN, UNSPECIFIED TYPE: ICD-10-CM

## 2024-10-16 PROBLEM — M77.51 BONE SPUR OF RIGHT FOOT: Status: ACTIVE | Noted: 2024-01-22

## 2024-10-16 LAB
ALBUMIN SERPL BCG-MCNC: 4.3 G/DL (ref 3.5–5.2)
ALP SERPL-CCNC: 91 U/L (ref 40–150)
ALT SERPL W P-5'-P-CCNC: 20 U/L (ref 0–50)
ANION GAP SERPL CALCULATED.3IONS-SCNC: 13 MMOL/L (ref 7–15)
AST SERPL W P-5'-P-CCNC: 28 U/L (ref 0–45)
BASOPHILS # BLD AUTO: 0.1 10E3/UL (ref 0–0.2)
BASOPHILS NFR BLD AUTO: 1 %
BILIRUB SERPL-MCNC: 0.3 MG/DL
BUN SERPL-MCNC: 26.7 MG/DL (ref 8–23)
CALCIUM SERPL-MCNC: 9.8 MG/DL (ref 8.8–10.4)
CHLORIDE SERPL-SCNC: 102 MMOL/L (ref 98–107)
CREAT SERPL-MCNC: 1.16 MG/DL (ref 0.51–0.95)
EGFRCR SERPLBLD CKD-EPI 2021: 50 ML/MIN/1.73M2
EOSINOPHIL # BLD AUTO: 0.7 10E3/UL (ref 0–0.7)
EOSINOPHIL NFR BLD AUTO: 7 %
ERYTHROCYTE [DISTWIDTH] IN BLOOD BY AUTOMATED COUNT: 13.7 % (ref 10–15)
EST. AVERAGE GLUCOSE BLD GHB EST-MCNC: 128 MG/DL
GLUCOSE SERPL-MCNC: 100 MG/DL (ref 70–99)
HBA1C MFR BLD: 6.1 %
HCO3 SERPL-SCNC: 21 MMOL/L (ref 22–29)
HCT VFR BLD AUTO: 38.1 % (ref 35–47)
HGB BLD-MCNC: 12.8 G/DL (ref 11.7–15.7)
IMM GRANULOCYTES # BLD: 0 10E3/UL
IMM GRANULOCYTES NFR BLD: 0 %
LYMPHOCYTES # BLD AUTO: 3.2 10E3/UL (ref 0.8–5.3)
LYMPHOCYTES NFR BLD AUTO: 33 %
MCH RBC QN AUTO: 28.3 PG (ref 26.5–33)
MCHC RBC AUTO-ENTMCNC: 33.6 G/DL (ref 31.5–36.5)
MCV RBC AUTO: 84 FL (ref 78–100)
MONOCYTES # BLD AUTO: 0.8 10E3/UL (ref 0–1.3)
MONOCYTES NFR BLD AUTO: 8 %
NEUTROPHILS # BLD AUTO: 5.1 10E3/UL (ref 1.6–8.3)
NEUTROPHILS NFR BLD AUTO: 51 %
NRBC # BLD AUTO: 0 10E3/UL
NRBC BLD AUTO-RTO: 0 /100
PLATELET # BLD AUTO: 280 10E3/UL (ref 150–450)
POTASSIUM SERPL-SCNC: 4.2 MMOL/L (ref 3.4–5.3)
PROT SERPL-MCNC: 7.8 G/DL (ref 6.4–8.3)
RBC # BLD AUTO: 4.53 10E6/UL (ref 3.8–5.2)
SODIUM SERPL-SCNC: 136 MMOL/L (ref 135–145)
TROPONIN T SERPL HS-MCNC: 9 NG/L
WBC # BLD AUTO: 10 10E3/UL (ref 4–11)

## 2024-10-16 PROCEDURE — 85004 AUTOMATED DIFF WBC COUNT: CPT | Mod: ZL | Performed by: FAMILY MEDICINE

## 2024-10-16 PROCEDURE — 80053 COMPREHEN METABOLIC PANEL: CPT | Mod: ZL | Performed by: FAMILY MEDICINE

## 2024-10-16 PROCEDURE — 84484 ASSAY OF TROPONIN QUANT: CPT | Mod: ZL | Performed by: FAMILY MEDICINE

## 2024-10-16 PROCEDURE — 36415 COLL VENOUS BLD VENIPUNCTURE: CPT | Mod: ZL | Performed by: FAMILY MEDICINE

## 2024-10-16 PROCEDURE — 99214 OFFICE O/P EST MOD 30 MIN: CPT | Performed by: FAMILY MEDICINE

## 2024-10-16 PROCEDURE — G2211 COMPLEX E/M VISIT ADD ON: HCPCS | Performed by: FAMILY MEDICINE

## 2024-10-16 PROCEDURE — 83036 HEMOGLOBIN GLYCOSYLATED A1C: CPT | Mod: ZL | Performed by: FAMILY MEDICINE

## 2024-10-16 PROCEDURE — G0463 HOSPITAL OUTPT CLINIC VISIT: HCPCS

## 2024-10-16 PROCEDURE — 93005 ELECTROCARDIOGRAM TRACING: CPT | Performed by: FAMILY MEDICINE

## 2024-10-16 PROCEDURE — 93010 ELECTROCARDIOGRAM REPORT: CPT | Performed by: INTERNAL MEDICINE

## 2024-10-16 PROCEDURE — 71046 X-RAY EXAM CHEST 2 VIEWS: CPT | Mod: TC

## 2024-10-16 RX ORDER — ALBUTEROL SULFATE 90 UG/1
2 INHALANT RESPIRATORY (INHALATION) EVERY 6 HOURS PRN
Qty: 18 G | Refills: 3 | Status: SHIPPED | OUTPATIENT
Start: 2024-10-16

## 2024-10-16 ASSESSMENT — PAIN SCALES - GENERAL: PAINLEVEL: NO PAIN (0)

## 2024-10-16 NOTE — PROGRESS NOTES
Assessment & Plan       ICD-10-CM    1. Chest pain, unspecified type  R07.9 EKG 12-lead complete w/read - Clinics     Comprehensive metabolic panel     CBC with Platelets & Differential     XR Chest 2 Views     Hemoglobin A1c     Troponin T, High Sensitivity     Comprehensive metabolic panel     CBC with Platelets & Differential     Hemoglobin A1c     Troponin T, High Sensitivity     NM MPI Treadmill      2. Chronic kidney disease, stage 3a (H)  N18.31 EKG 12-lead complete w/read - Clinics     Comprehensive metabolic panel     CBC with Platelets & Differential     XR Chest 2 Views     Hemoglobin A1c     Troponin T, High Sensitivity     Comprehensive metabolic panel     CBC with Platelets & Differential     Hemoglobin A1c     Troponin T, High Sensitivity      3. Prediabetes  R73.03 EKG 12-lead complete w/read - Clinics     Comprehensive metabolic panel     CBC with Platelets & Differential     XR Chest 2 Views     Hemoglobin A1c     Troponin T, High Sensitivity     Comprehensive metabolic panel     CBC with Platelets & Differential     Hemoglobin A1c     Troponin T, High Sensitivity     NM MPI Treadmill      4. Dyslipidemia  E78.5 EKG 12-lead complete w/read - Clinics     Comprehensive metabolic panel     CBC with Platelets & Differential     XR Chest 2 Views     Hemoglobin A1c     Troponin T, High Sensitivity     Comprehensive metabolic panel     CBC with Platelets & Differential     Hemoglobin A1c     Troponin T, High Sensitivity     NM MPI Treadmill      5. Reactive airway disease that is not asthma  J98.9 albuterol (PROAIR HFA/PROVENTIL HFA/VENTOLIN HFA) 108 (90 Base) MCG/ACT inhaler      6. FH: CAD (coronary artery disease)  Z82.49 NM MPI Treadmill      Probably low yield but due to risk factors and age along with her previous sx-- will order exercise stress test  Right now recommend baby asa daily   No strenuous activity - if reoccurs - go to ER  Continue current medications and behavioral changes.  "  Symptomatic treatment was discussed along when patient should call and/or come back into the clinic or go to ER/Urgent care. All questions answered.             BMI  Estimated body mass index is 30.03 kg/m  as calculated from the following:    Height as of this encounter: 1.6 m (5' 3\").    Weight as of this encounter: 76.9 kg (169 lb 8 oz).             No follow-ups on file.    Subjective   Katelny is a 69 year old, presenting for the following health issues:  Chest Pain        10/16/2024     3:43 PM   Additional Questions   Roomed by Edilia ZHOU   Accompanied by self     History of Present Illness       Reason for visit:  Chest pain  Symptom onset:  1-3 days ago She is missing 1 dose(s) of medications per week.       Chest Pain  Onset/Duration: Monday   Description:   Location: first pain was a sharp pain in back, then pain in middle of chest. Not crushing, low level pain. Burped and it went away   Character: heaviness feeling but not bad   Radiation: none   Duration: less than 5 minutes   Intensity: mild  Progression of Symptoms: improving- hasn't had the pain since Monday   Accompanying Signs & Symptoms:  Shortness of breath: YES- going from basement to upstairs but not with the pain.   Sweating: No  Nausea/vomiting: No  Lightheadedness: YES  Palpitations: No  Fever/Chills: YES- with COVID- had in September   Cough: YES- but better            Heartburn: YES  History:   Family history of heart disease: YES- father   Tobacco use: No  Previous similar symptoms: YES- few years ago no pain just didn't feel good   Precipitating factors:   Worse with exertion: No  Worse with deep breaths: No           Related to eating: No           Better with burping: YES  Alleviating factors: drinking milk helps with heartburn   Therapies tried and outcome: nothing    Walking and shopping   5 min  of pain in mid back / between scapula then left chest and then numb and tingling in 4-5 th left finger  Resolved in 5 min  No sweats no " "nausea no LA/SOB  Fh CAD - father   Has had some back / mid thoracic pain   Increase stress -- dtr getting divorce and living with her     Rare GERD   No msk pain in back         Review of Systems  Constitutional, neuro, ENT, endocrine, pulmonary, cardiac, gastrointestinal, genitourinary, musculoskeletal, integument and psychiatric systems are negative, except as otherwise noted.      Objective    /82 (BP Location: Left arm, Patient Position: Sitting, Cuff Size: Adult Regular)   Pulse 74   Temp 97.9  F (36.6  C) (Tympanic)   Ht 1.6 m (5' 3\")   Wt 76.9 kg (169 lb 8 oz)   SpO2 98%   BMI 30.03 kg/m    Body mass index is 30.03 kg/m .  Physical Exam   GENERAL: alert and no distress  HENT: ear canals and TM's normal, nose and mouth without ulcers or lesions  NECK: no adenopathy, no asymmetry, masses, or scars  RESP: lungs clear to auscultation - no rales, rhonchi or wheezes  CV: regular rate and rhythm, normal S1 S2, no S3 or S4, no murmur, click or rub, no peripheral edema  ABDOMEN: soft, nontender, no hepatosplenomegaly, no masses and bowel sounds normal  MS: no gross musculoskeletal defects noted, no edema  PSYCH: mentation appears normal, affect normal/bright    Results for orders placed or performed in visit on 10/16/24   Comprehensive metabolic panel     Status: Abnormal   Result Value Ref Range    Sodium 136 135 - 145 mmol/L    Potassium 4.2 3.4 - 5.3 mmol/L    Carbon Dioxide (CO2) 21 (L) 22 - 29 mmol/L    Anion Gap 13 7 - 15 mmol/L    Urea Nitrogen 26.7 (H) 8.0 - 23.0 mg/dL    Creatinine 1.16 (H) 0.51 - 0.95 mg/dL    GFR Estimate 50 (L) >60 mL/min/1.73m2    Calcium 9.8 8.8 - 10.4 mg/dL    Chloride 102 98 - 107 mmol/L    Glucose 100 (H) 70 - 99 mg/dL    Alkaline Phosphatase 91 40 - 150 U/L    AST 28 0 - 45 U/L    ALT 20 0 - 50 U/L    Protein Total 7.8 6.4 - 8.3 g/dL    Albumin 4.3 3.5 - 5.2 g/dL    Bilirubin Total 0.3 <=1.2 mg/dL   Hemoglobin A1c     Status: Abnormal   Result Value Ref Range    " Estimated Average Glucose 128 (H) <117 mg/dL    Hemoglobin A1C 6.1 (H) <5.7 %   Troponin T, High Sensitivity     Status: Normal   Result Value Ref Range    Troponin T, High Sensitivity 9 <=14 ng/L   CBC with platelets and differential     Status: None   Result Value Ref Range    WBC Count 10.0 4.0 - 11.0 10e3/uL    RBC Count 4.53 3.80 - 5.20 10e6/uL    Hemoglobin 12.8 11.7 - 15.7 g/dL    Hematocrit 38.1 35.0 - 47.0 %    MCV 84 78 - 100 fL    MCH 28.3 26.5 - 33.0 pg    MCHC 33.6 31.5 - 36.5 g/dL    RDW 13.7 10.0 - 15.0 %    Platelet Count 280 150 - 450 10e3/uL    % Neutrophils 51 %    % Lymphocytes 33 %    % Monocytes 8 %    % Eosinophils 7 %    % Basophils 1 %    % Immature Granulocytes 0 %    NRBCs per 100 WBC 0 <1 /100    Absolute Neutrophils 5.1 1.6 - 8.3 10e3/uL    Absolute Lymphocytes 3.2 0.8 - 5.3 10e3/uL    Absolute Monocytes 0.8 0.0 - 1.3 10e3/uL    Absolute Eosinophils 0.7 0.0 - 0.7 10e3/uL    Absolute Basophils 0.1 0.0 - 0.2 10e3/uL    Absolute Immature Granulocytes 0.0 <=0.4 10e3/uL    Absolute NRBCs 0.0 10e3/uL   CBC with Platelets & Differential     Status: None    Narrative    The following orders were created for panel order CBC with Platelets & Differential.  Procedure                               Abnormality         Status                     ---------                               -----------         ------                     CBC with platelets and d...[[id number]]                      Final result                 Please view results for these tests on the individual orders.   Results for orders placed or performed in visit on 10/16/24   XR Chest 2 Views     Status: None    Narrative    PROCEDURE:  XR CHEST 2 VIEWS    HISTORY: Chest pain, unspecified type; Chronic kidney disease, stage  3a (H); Prediabetes; Dyslipidemia, .    COMPARISON:  None.    FINDINGS:  The cardiomediastinal contours are normal. The trachea is midline.  No focal consolidation, effusion or pneumothorax.    No suspicious  osseous lesion or subdiaphragmatic free air.      Impression    IMPRESSION:      No acute cardiopulmonary process.      CASANDRA VARGAS MD         SYSTEM ID:  [id number]     EKG- unremarkable         Signed Electronically by: Robert Blakely MD

## 2024-10-16 NOTE — TELEPHONE ENCOUNTER
Spoke with patient she stated that mentioned to PCP a few years ago that she was having a feeling of chest pain, PCP told her to call back if she had these symptoms again. Patient called today stating that she had a light weight chest pain on Monday along with 2 fingers on her left hand were tingling. Walked around for a bit and it went away. More heartburn lately. Hasn't had those symptoms since Monday, last night she felt that she had heartburn while sleeping. Sent to [facility] as well in case it needs to be triaged.

## 2024-10-16 NOTE — TELEPHONE ENCOUNTER
Symptom or reason needing to speak to RN: Chest pain      Best number to return call: Try first [phone] 2nd if she doesn't answer [phone]    Best time to return call: whenever

## 2024-10-16 NOTE — TELEPHONE ENCOUNTER
I reviewed chart and do not recall our visit.  I can see her today but if can not do it today -- should see someone / colleague on Thursday or Friday to evaluate and discuss.

## 2024-10-25 ENCOUNTER — HOSPITAL ENCOUNTER (OUTPATIENT)
Dept: NUCLEAR MEDICINE | Facility: HOSPITAL | Age: 69
Setting detail: NUCLEAR MEDICINE
Discharge: HOME | End: 2024-10-25
Attending: FAMILY MEDICINE
Payer: COMMERCIAL

## 2024-10-25 ENCOUNTER — HOSPITAL ENCOUNTER (OUTPATIENT)
Dept: CARDIOLOGY | Facility: HOSPITAL | Age: 69
Setting detail: NUCLEAR MEDICINE
Discharge: HOME | End: 2024-10-25
Attending: FAMILY MEDICINE
Payer: COMMERCIAL

## 2024-10-25 DIAGNOSIS — R73.03 PREDIABETES: ICD-10-CM

## 2024-10-25 DIAGNOSIS — E78.5 DYSLIPIDEMIA: ICD-10-CM

## 2024-10-25 DIAGNOSIS — R07.9 CHEST PAIN, UNSPECIFIED TYPE: ICD-10-CM

## 2024-10-25 DIAGNOSIS — Z82.49 FH: CAD (CORONARY ARTERY DISEASE): ICD-10-CM

## 2024-10-25 PROCEDURE — A9500 TC99M SESTAMIBI: HCPCS | Performed by: RADIOLOGY

## 2024-10-25 PROCEDURE — 343N000001 HC RX 343 MED OP 636: Performed by: RADIOLOGY

## 2024-10-25 PROCEDURE — 258N000003 HC RX IP 258 OP 636: Performed by: INTERNAL MEDICINE

## 2024-10-25 PROCEDURE — 93016 CV STRESS TEST SUPVJ ONLY: CPT | Performed by: INTERNAL MEDICINE

## 2024-10-25 PROCEDURE — 78452 HT MUSCLE IMAGE SPECT MULT: CPT

## 2024-10-25 PROCEDURE — 93018 CV STRESS TEST I&R ONLY: CPT | Performed by: INTERNAL MEDICINE

## 2024-10-25 PROCEDURE — 93017 CV STRESS TEST TRACING ONLY: CPT

## 2024-10-25 RX ORDER — REGADENOSON 0.08 MG/ML
0.4 INJECTION, SOLUTION INTRAVENOUS ONCE
Status: DISCONTINUED | OUTPATIENT
Start: 2024-10-25 | End: 2024-10-25

## 2024-10-25 RX ORDER — SODIUM CHLORIDE 9 MG/ML
INJECTION, SOLUTION INTRAVENOUS ONCE
Status: COMPLETED | OUTPATIENT
Start: 2024-10-25 | End: 2024-10-25

## 2024-10-25 RX ADMIN — Medication 9.2 MILLICURIE: at 07:16

## 2024-10-25 RX ADMIN — SODIUM CHLORIDE: 9 INJECTION, SOLUTION INTRAVENOUS at 09:13

## 2024-10-25 RX ADMIN — Medication 27.2 MILLICURIE: at 09:36

## 2024-10-28 LAB
CV BLOOD PRESSURE: 87 MMHG
CV STRESS MAX HR HE: 137
NUC STRESS EJECTION FRACTION: 88 %
RATE PRESSURE PRODUCT: NORMAL
STRESS ECHO BASELINE DIASTOLIC HE: 82
STRESS ECHO BASELINE HR: 75 BPM
STRESS ECHO BASELINE SYSTOLIC BP: 128
STRESS ECHO CALCULATED PERCENT HR: 91 %
STRESS ECHO LAST STRESS DIASTOLIC BP: 94
STRESS ECHO LAST STRESS SYSTOLIC BP: 160
STRESS ECHO POST ESTIMATED WORKLOAD: 8.3 METS
STRESS ECHO POST EXERCISE DUR MIN: 6 MIN
STRESS ECHO POST EXERCISE DUR SEC: 45 SEC
STRESS ECHO TARGET HR: 150

## 2024-12-04 ENCOUNTER — MYC MEDICAL ADVICE (OUTPATIENT)
Dept: FAMILY MEDICINE | Facility: OTHER | Age: 69
End: 2024-12-04

## 2024-12-04 DIAGNOSIS — J01.00 ACUTE NON-RECURRENT MAXILLARY SINUSITIS: Primary | ICD-10-CM

## 2024-12-19 ENCOUNTER — MYC MEDICAL ADVICE (OUTPATIENT)
Dept: FAMILY MEDICINE | Facility: OTHER | Age: 69
End: 2024-12-19

## 2024-12-19 DIAGNOSIS — R05.2 SUBACUTE COUGH: Primary | ICD-10-CM

## 2024-12-19 RX ORDER — METHYLPREDNISOLONE 4 MG/1
TABLET ORAL
Qty: 21 TABLET | Refills: 0 | Status: SHIPPED | OUTPATIENT
Start: 2024-12-19

## 2025-01-06 ENCOUNTER — MYC MEDICAL ADVICE (OUTPATIENT)
Dept: FAMILY MEDICINE | Facility: OTHER | Age: 70
End: 2025-01-06

## 2025-01-07 ENCOUNTER — DOCUMENTATION ONLY (OUTPATIENT)
Dept: OTHER | Facility: CLINIC | Age: 70
End: 2025-01-07

## 2025-01-24 NOTE — PROGRESS NOTES
"  Assessment & Plan     Pre-diabetes / Hypercholesteremia  Reviewed labs, recheck at annual    Chronic kidney disease, stage 3a (H)  Stable over the last several years, discussed usual age related changes in renal function. At this point, no indication of progression that would lead to need for dialysis unless something additional occurred.     Reactive airway disease that is not asthma / Chronic cough  Noting ongoing cough, worsening reactive airway since COVID infection last fall. Update PFT.     Adjustment disorder with mixed anxiety and depressed mood  Stable mood.       BMI  Estimated body mass index is 29.76 kg/m  as calculated from the following:    Height as of this encounter: 1.6 m (5' 3\").    Weight as of this encounter: 76.2 kg (168 lb).     No follow-ups on file.    Katya Zepeda is a 69 year old, presenting for the following health issues:  Establish Care, Lipids, Diabetes, Hypertension, Depression, and Anxiety        1/27/2025    10:45 AM   Additional Questions   Roomed by Coral White     History of Present Illness     Asthma:  She presents for follow up of asthma.  She has some cough, some wheezing, and no shortness of breath.  She is using a relief medication a few times a week. She typically misses taking her controller medication 4 time(s) per week. Patient is aware of the following triggers: cold air. The patient has not had a visit to the Emergency Room, Urgent Care or Hospital due to asthma since the last clinic visit.     Reason for visit:  Follow up for continued coughing due to covid    She eats 2-3 servings of fruits and vegetables daily.She consumes 0 sweetened beverage(s) daily.She exercises with enough effort to increase her heart rate 9 or less minutes per day.  She exercises with enough effort to increase her heart rate 3 or less days per week. She is missing 1 dose(s) of medications per week.  She is not taking prescribed medications regularly due to remembering to take.   "   Diabetes Follow-up    How often are you checking your blood sugar? Not at all  What concerns do you have today about your diabetes? None   Do you have any of these symptoms? (Select all that apply)  No numbness or tingling in feet.  No redness, sores or blisters on feet.  No complaints of excessive thirst.  No reports of blurry vision.  No significant changes to weight.    Hyperlipidemia Follow-Up    Are you regularly taking any medication or supplement to lower your cholesterol?   Yes- Lipitor 10 mg  Are you having muscle aches or other side effects that you think could be caused by your cholesterol lowering medication?  No    Hypertension Follow-up    Do you check your blood pressure regularly outside of the clinic? No   Are you following a low salt diet? No  Are your blood pressures ever more than 140 on the top number (systolic) OR more   than 90 on the bottom number (diastolic), for example 140/90? No    BP Readings from Last 2 Encounters:   01/27/25 118/78   10/16/24 122/82     Hemoglobin A1C (%)   Date Value   10/16/2024 6.1 (H)   12/27/2023 5.9 (H)   09/29/2017 5.6   09/15/2016 5.9     LDL Cholesterol Calculated (mg/dL)   Date Value   12/27/2023 79   12/27/2021 72   11/09/2020 131 (H)   10/23/2018 128 (H)     Depression and Anxiety   How are you doing with your depression since your last visit? No change  How are you doing with your anxiety since your last visit?  No change  Are you having other symptoms that might be associated with depression or anxiety? No  Have you had a significant life event? OTHER: Family health concerns    Do you have any concerns with your use of alcohol or other drugs? No    Social History     Tobacco Use    Smoking status: Never     Passive exposure: Never    Smokeless tobacco: Never   Vaping Use    Vaping status: Never Used   Substance Use Topics    Alcohol use: Yes     Comment: Occasionally never daily    Drug use: Never         5/5/2021     1:00 PM 11/23/2021     2:00 PM  "12/27/2023     9:48 AM   PHQ   PHQ-9 Total Score 2 0 2   Q9: Thoughts of better off dead/self-harm past 2 weeks Not at all Not at all Not at all         10/26/2020     2:00 PM 5/5/2021     1:00 PM 11/23/2021     2:00 PM   PENELOPE-7 SCORE   Total Score 0 0 1         12/27/2023     9:48 AM   Last PHQ-9   1.  Little interest or pleasure in doing things 0   2.  Feeling down, depressed, or hopeless 0   3.  Trouble falling or staying asleep, or sleeping too much 0   4.  Feeling tired or having little energy 1   5.  Poor appetite or overeating 1   6.  Feeling bad about yourself 0   7.  Trouble concentrating 0   8.  Moving slowly or restless 0   Q9: Thoughts of better off dead/self-harm past 2 weeks 0   PHQ-9 Total Score 2         11/23/2021     2:00 PM   PENELOPE-7    1. Feeling nervous, anxious, or on edge 0   2. Not being able to stop or control worrying 0   3. Worrying too much about different things 0   4. Trouble relaxing 0   5. Being so restless that it is hard to sit still 0   6. Becoming easily annoyed or irritable 1   7. Feeling afraid, as if something awful might happen 0   PENELOPE-7 Total Score 1   If you checked any problems, how difficult have they made it for you to do your work, take care of things at home, or get along with other people? Not difficult at all     Chronic Kidney Disease Follow-up    Do you take any over the counter pain medicine?: Yes  What over the counter medicine are you taking for your pain?:  Ibuprofen    How often do you take this medicine?:   once a month    Review of Systems  Constitutional, neuro, ENT, endocrine, pulmonary, cardiac, gastrointestinal, genitourinary, musculoskeletal, integument and psychiatric systems are negative, except as otherwise noted.      Objective    /78 (BP Location: Left arm, Patient Position: Sitting, Cuff Size: Adult Regular)   Pulse 83   Temp 98.4  F (36.9  C) (Tympanic)   Resp 16   Ht 1.6 m (5' 3\")   Wt 76.2 kg (168 lb)   SpO2 98%   BMI 29.76 kg/m  "   Body mass index is 29.76 kg/m .    Physical Exam   GENERAL: alert and no distress  RESP: lungs clear to auscultation - no rales, rhonchi or wheezes  CV: regular rates and rhythm, normal S1 S2, no S3 or S4, no murmur, click or rub, and no peripheral edema  MS: no gross musculoskeletal defects noted, no edema  PSYCH: mentation appears normal, affect normal/bright    No results found for any visits on 01/27/25.        Signed Electronically by: Vera Peterson MD    The longitudinal plan of care for the diagnosis(es)/condition(s) as documented were addressed during this visit. Due to the added complexity in care, I will continue to support Katelyn in the subsequent management and with ongoing continuity of care.

## 2025-01-27 ENCOUNTER — OFFICE VISIT (OUTPATIENT)
Dept: FAMILY MEDICINE | Facility: OTHER | Age: 70
End: 2025-01-27
Attending: FAMILY MEDICINE
Payer: COMMERCIAL

## 2025-01-27 VITALS
BODY MASS INDEX: 29.77 KG/M2 | TEMPERATURE: 98.4 F | DIASTOLIC BLOOD PRESSURE: 78 MMHG | SYSTOLIC BLOOD PRESSURE: 118 MMHG | HEART RATE: 83 BPM | WEIGHT: 168 LBS | HEIGHT: 63 IN | RESPIRATION RATE: 16 BRPM | OXYGEN SATURATION: 98 %

## 2025-01-27 DIAGNOSIS — R05.3 CHRONIC COUGH: ICD-10-CM

## 2025-01-27 DIAGNOSIS — R73.03 PRE-DIABETES: Primary | ICD-10-CM

## 2025-01-27 DIAGNOSIS — E78.00 HYPERCHOLESTEREMIA: ICD-10-CM

## 2025-01-27 DIAGNOSIS — F43.23 ADJUSTMENT DISORDER WITH MIXED ANXIETY AND DEPRESSED MOOD: ICD-10-CM

## 2025-01-27 DIAGNOSIS — N18.31 CHRONIC KIDNEY DISEASE, STAGE 3A (H): ICD-10-CM

## 2025-01-27 DIAGNOSIS — J98.9 REACTIVE AIRWAY DISEASE WITHOUT ASTHMA: ICD-10-CM

## 2025-01-27 DIAGNOSIS — E78.5 DYSLIPIDEMIA: ICD-10-CM

## 2025-01-27 PROCEDURE — G2211 COMPLEX E/M VISIT ADD ON: HCPCS | Performed by: FAMILY MEDICINE

## 2025-01-27 PROCEDURE — 99213 OFFICE O/P EST LOW 20 MIN: CPT | Performed by: FAMILY MEDICINE

## 2025-01-27 RX ORDER — ATORVASTATIN CALCIUM 10 MG/1
10 TABLET, FILM COATED ORAL DAILY
Qty: 90 TABLET | Refills: 3 | Status: SHIPPED | OUTPATIENT
Start: 2025-01-27

## 2025-01-27 RX ORDER — CITALOPRAM HYDROBROMIDE 10 MG/1
10 TABLET ORAL DAILY
Qty: 90 TABLET | Refills: 3 | Status: SHIPPED | OUTPATIENT
Start: 2025-01-27

## 2025-01-27 ASSESSMENT — PAIN SCALES - GENERAL: PAINLEVEL_OUTOF10: NO PAIN (0)

## 2025-02-15 ENCOUNTER — MYC MEDICAL ADVICE (OUTPATIENT)
Dept: FAMILY MEDICINE | Facility: OTHER | Age: 70
End: 2025-02-15

## 2025-03-19 ENCOUNTER — TELEPHONE (OUTPATIENT)
Dept: MAMMOGRAPHY | Facility: HOSPITAL | Age: 70
End: 2025-03-19

## 2025-03-19 ENCOUNTER — ANCILLARY PROCEDURE (OUTPATIENT)
Dept: MAMMOGRAPHY | Facility: OTHER | Age: 70
End: 2025-03-19
Attending: FAMILY MEDICINE
Payer: COMMERCIAL

## 2025-03-19 DIAGNOSIS — Z12.31 VISIT FOR SCREENING MAMMOGRAM: ICD-10-CM

## 2025-03-19 PROCEDURE — 77067 SCR MAMMO BI INCL CAD: CPT | Mod: TC

## 2025-03-19 PROCEDURE — 77063 BREAST TOMOSYNTHESIS BI: CPT | Mod: TC

## 2025-03-21 ENCOUNTER — APPOINTMENT (OUTPATIENT)
Dept: CT IMAGING | Facility: HOSPITAL | Age: 70
End: 2025-03-21
Attending: EMERGENCY MEDICINE
Payer: COMMERCIAL

## 2025-03-21 ENCOUNTER — HOSPITAL ENCOUNTER (EMERGENCY)
Facility: HOSPITAL | Age: 70
Discharge: HOME | End: 2025-03-21
Attending: EMERGENCY MEDICINE
Payer: COMMERCIAL

## 2025-03-21 VITALS
DIASTOLIC BLOOD PRESSURE: 93 MMHG | SYSTOLIC BLOOD PRESSURE: 145 MMHG | HEIGHT: 63 IN | BODY MASS INDEX: 29.73 KG/M2 | TEMPERATURE: 97.5 F | RESPIRATION RATE: 16 BRPM | HEART RATE: 65 BPM | WEIGHT: 167.77 LBS | OXYGEN SATURATION: 97 %

## 2025-03-21 DIAGNOSIS — R10.84 GENERALIZED ABDOMINAL PAIN: ICD-10-CM

## 2025-03-21 DIAGNOSIS — N17.9 ACUTE KIDNEY INJURY: ICD-10-CM

## 2025-03-21 LAB
ALBUMIN SERPL BCG-MCNC: 4.4 G/DL (ref 3.5–5.2)
ALBUMIN UR-MCNC: NEGATIVE MG/DL
ALP SERPL-CCNC: 100 U/L (ref 40–150)
ALT SERPL W P-5'-P-CCNC: 22 U/L (ref 0–50)
ANION GAP SERPL CALCULATED.3IONS-SCNC: 14 MMOL/L (ref 7–15)
APPEARANCE UR: CLEAR
AST SERPL W P-5'-P-CCNC: 34 U/L (ref 0–45)
BASOPHILS # BLD AUTO: 0.1 10E3/UL (ref 0–0.2)
BASOPHILS NFR BLD AUTO: 1 %
BILIRUB SERPL-MCNC: 0.3 MG/DL
BILIRUB UR QL STRIP: NEGATIVE
BUN SERPL-MCNC: 24.4 MG/DL (ref 8–23)
CALCIUM SERPL-MCNC: 10 MG/DL (ref 8.8–10.4)
CHLORIDE SERPL-SCNC: 101 MMOL/L (ref 98–107)
COLOR UR AUTO: ABNORMAL
CREAT SERPL-MCNC: 1.5 MG/DL (ref 0.51–0.95)
EGFRCR SERPLBLD CKD-EPI 2021: 37 ML/MIN/1.73M2
EOSINOPHIL # BLD AUTO: 0.7 10E3/UL (ref 0–0.7)
EOSINOPHIL NFR BLD AUTO: 6 %
ERYTHROCYTE [DISTWIDTH] IN BLOOD BY AUTOMATED COUNT: 13.3 % (ref 10–15)
GLUCOSE SERPL-MCNC: 137 MG/DL (ref 70–99)
GLUCOSE UR STRIP-MCNC: NEGATIVE MG/DL
HCO3 SERPL-SCNC: 25 MMOL/L (ref 22–29)
HCT VFR BLD AUTO: 39.8 % (ref 35–47)
HGB BLD-MCNC: 13.4 G/DL (ref 11.7–15.7)
HGB UR QL STRIP: NEGATIVE
HOLD SPECIMEN: NORMAL
HOLD SPECIMEN: NORMAL
IMM GRANULOCYTES # BLD: 0 10E3/UL
IMM GRANULOCYTES NFR BLD: 0 %
INR PPP: 0.92 (ref 0.85–1.15)
KETONES UR STRIP-MCNC: NEGATIVE MG/DL
LEUKOCYTE ESTERASE UR QL STRIP: NEGATIVE
LIPASE SERPL-CCNC: 59 U/L (ref 13–60)
LYMPHOCYTES # BLD AUTO: 3.7 10E3/UL (ref 0.8–5.3)
LYMPHOCYTES NFR BLD AUTO: 32 %
MCH RBC QN AUTO: 28.6 PG (ref 26.5–33)
MCHC RBC AUTO-ENTMCNC: 33.7 G/DL (ref 31.5–36.5)
MCV RBC AUTO: 85 FL (ref 78–100)
MONOCYTES # BLD AUTO: 0.8 10E3/UL (ref 0–1.3)
MONOCYTES NFR BLD AUTO: 7 %
MUCOUS THREADS #/AREA URNS LPF: PRESENT /LPF
NEUTROPHILS # BLD AUTO: 6.1 10E3/UL (ref 1.6–8.3)
NEUTROPHILS NFR BLD AUTO: 53 %
NITRATE UR QL: NEGATIVE
NRBC # BLD AUTO: 0 10E3/UL
NRBC BLD AUTO-RTO: 0 /100
PH UR STRIP: 5.5 [PH] (ref 4.7–8)
PLATELET # BLD AUTO: 302 10E3/UL (ref 150–450)
POTASSIUM SERPL-SCNC: 4.3 MMOL/L (ref 3.4–5.3)
PROT SERPL-MCNC: 8.2 G/DL (ref 6.4–8.3)
RBC # BLD AUTO: 4.68 10E6/UL (ref 3.8–5.2)
RBC URINE: <1 /HPF
SODIUM SERPL-SCNC: 140 MMOL/L (ref 135–145)
SP GR UR STRIP: 1.01 (ref 1–1.03)
SQUAMOUS EPITHELIAL: 0 /HPF
UROBILINOGEN UR STRIP-MCNC: NORMAL MG/DL
WBC # BLD AUTO: 11.4 10E3/UL (ref 4–11)
WBC URINE: 1 /HPF

## 2025-03-21 PROCEDURE — 96360 HYDRATION IV INFUSION INIT: CPT | Mod: XU

## 2025-03-21 PROCEDURE — 83690 ASSAY OF LIPASE: CPT | Performed by: EMERGENCY MEDICINE

## 2025-03-21 PROCEDURE — 36415 COLL VENOUS BLD VENIPUNCTURE: CPT | Performed by: EMERGENCY MEDICINE

## 2025-03-21 PROCEDURE — 99285 EMERGENCY DEPT VISIT HI MDM: CPT | Mod: 25

## 2025-03-21 PROCEDURE — 250N000011 HC RX IP 250 OP 636: Performed by: EMERGENCY MEDICINE

## 2025-03-21 PROCEDURE — 99284 EMERGENCY DEPT VISIT MOD MDM: CPT | Performed by: EMERGENCY MEDICINE

## 2025-03-21 PROCEDURE — 80053 COMPREHEN METABOLIC PANEL: CPT | Performed by: EMERGENCY MEDICINE

## 2025-03-21 PROCEDURE — 74177 CT ABD & PELVIS W/CONTRAST: CPT

## 2025-03-21 PROCEDURE — 85610 PROTHROMBIN TIME: CPT | Performed by: EMERGENCY MEDICINE

## 2025-03-21 PROCEDURE — 85025 COMPLETE CBC W/AUTO DIFF WBC: CPT | Performed by: EMERGENCY MEDICINE

## 2025-03-21 PROCEDURE — 81001 URINALYSIS AUTO W/SCOPE: CPT | Performed by: EMERGENCY MEDICINE

## 2025-03-21 PROCEDURE — 258N000003 HC RX IP 258 OP 636: Performed by: EMERGENCY MEDICINE

## 2025-03-21 RX ORDER — IOPAMIDOL 755 MG/ML
82 INJECTION, SOLUTION INTRAVASCULAR ONCE
Status: COMPLETED | OUTPATIENT
Start: 2025-03-21 | End: 2025-03-21

## 2025-03-21 RX ADMIN — SODIUM CHLORIDE 1000 ML: 9 INJECTION, SOLUTION INTRAVENOUS at 21:33

## 2025-03-21 RX ADMIN — IOPAMIDOL 82 ML: 755 INJECTION, SOLUTION INTRAVENOUS at 20:50

## 2025-03-21 ASSESSMENT — ENCOUNTER SYMPTOMS
FEVER: 0
SHORTNESS OF BREATH: 0
CHILLS: 0
COUGH: 0

## 2025-03-21 ASSESSMENT — ACTIVITIES OF DAILY LIVING (ADL)
ADLS_ACUITY_SCORE: 41

## 2025-03-21 ASSESSMENT — COLUMBIA-SUICIDE SEVERITY RATING SCALE - C-SSRS
6. HAVE YOU EVER DONE ANYTHING, STARTED TO DO ANYTHING, OR PREPARED TO DO ANYTHING TO END YOUR LIFE?: NO
1. IN THE PAST MONTH, HAVE YOU WISHED YOU WERE DEAD OR WISHED YOU COULD GO TO SLEEP AND NOT WAKE UP?: NO
2. HAVE YOU ACTUALLY HAD ANY THOUGHTS OF KILLING YOURSELF IN THE PAST MONTH?: NO

## 2025-03-21 NOTE — ED TRIAGE NOTES
Pt presents with c/o of hx of diverticulitis. Had it about a month ago.  Low level of abdominal pain and today more constant.  This is has been on and off pain and is different then usual. Fighting constipation and was in [place] and hasn't gotten.   Pt used to get Rx from Dr. Blakely to keep on hand when having diverticulitis flare up but doesn't have any this time

## 2025-03-22 NOTE — ED PROVIDER NOTES
History     Chief Complaint   Patient presents with    Abdominal Pain     HPI  Katelyn Manley is a 69 year old female who is here w/ vague abdominal discomfort for ~ 10 days. Upper abdomen. Feels bloated. Unlike previous episodes of diverticulitis. Had a bm today. A few very brief episodes of nausea. No pattern, nothing makes it better or worse.    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      Status post vaginal hysterectomy 09/12/2016     Priority: Medium     Bso,sling,post repair, [id number]      Dyslipidemia 05/15/2014     Priority: Medium    Lichen sclerosus 09/16/2013     Priority: Medium    Leukoplakia 07/03/2013     Priority: Medium    Pre-diabetes 05/06/2013     Priority: Medium        Past Medical History:    Past Medical History:   Diagnosis Date    Adjustment disorder with mixed anxiety and depressed mood 10/26/2020    Chronic kidney disease, stage 3a (H) 11/23/2021    Cyst of Bartholin's gland 10/22/2007    Depressive disorder 2010    Diabetes (H) 2007    Diverticulosis of large intestine without hemorrhage 10/23/2018     Dyslipidemia 05/15/2014    Endometrial hyperplasia without atypia, simple     Glucose intolerance (impaired glucose tolerance) 790.22    Hypercholesterolemia 08/23/2011    Lichen sclerosus 09/16/2013    Menopause 05/06/2013    Osteoarthrosis, unspecified whether generalized or localized, lower leg 07/09/2002    Pre-diabetes 05/06/2013    Reactive airway disease that is not asthma 04/04/2018    Special screening for malignant neoplasms, colon 10/15/2004    Uncomplicated asthma 1990       Past Surgical History:    Past Surgical History:   Procedure Laterality Date    ARTHROSCOPY KNEE Left     x 2    BIOPSY  2013    For lichen sclerosis diagnosis    COLONOSCOPY  2004    COLONOSCOPY  02/03/2012    Dr Sanchez/ sigmoid diverticular dz    COLPORRHAPHY POSTERIOR N/A 02/18/2015    Procedure: COLPORRHAPHY POSTERIOR;  Surgeon: Vasquez Sauer MD;  Location: [facility]    CYSTOSCOPY N/A 02/18/2015    Procedure: CYSTOSCOPY;  Surgeon: Vasquez Sauer MD;  Location: [facility]    DILATION AND CURETTAGE, HYSTEROSCOPY DIAGNOSTIC, COMBINED  05/07/2014    Procedure: COMBINED DILATION AND CURETTAGE, HYSTEROSCOPY DIAGNOSTIC;  Surgeon: Vasquez Sauer MD;  Location: [facility]    EYE SURGERY Left 08/2016    macular hole repair    HYSTERECTOMY VAGINAL, BILATERAL SALPINGO-OOPHERECTOMY, COMBINED N/A 02/18/2015    Procedure: COMBINED HYSTERECTOMY VAGINAL, SALPINGO-OOPHORECTOMY;  Surgeon: Vasquez Sauer MD;  Location: [facility]    PHACOEMULSIFICATION WITH STANDARD INTRAOCULAR LENS IMPLANT Left 04/24/2018    Procedure: PHACOEMULSIFICATION WITH STANDARD INTRAOCULAR LENS IMPLANT;  PHACOEMULSIFICATION CATARACT EXTRACTION POSTERIOR CHAMBER LENS LEFT/TECNIS-TORIC, 10% CORINA;  Surgeon: Jerome Snow MD;  Location: [facility]    REPLACEMENT TOTAL KNEE Left 2004    SLING TRANSOBTURATOR N/A 02/18/2015    Procedure: SLING TRANSOBTURATOR;  Surgeon: Vasquez Sauer MD;  Location: [facility]       Family History:    Family History   Problem Relation Age of Onset    Other - See Comments  "Mother         Glenn Granulometosis - cause of death    Other Cancer Mother         Wegeners granulomatosis    Coronary Artery Disease Father         Heart attack age 55    Cerebrovascular Disease Father 77        CVA - cause of death    Coronary Artery Disease Brother     Other - See Comments Brother         multiple sclerosis       Social History:  Marital Status:   [2]  Social History     Tobacco Use    Smoking status: Never     Passive exposure: Never    Smokeless tobacco: Never   Vaping Use    Vaping status: Never Used   Substance Use Topics    Alcohol use: Yes     Comment: Occasionally never daily    Drug use: Never        Medications:    albuterol (PROAIR HFA/PROVENTIL HFA/VENTOLIN HFA) 108 (90 Base) MCG/ACT inhaler  atorvastatin (LIPITOR) 10 MG tablet  citalopram (CELEXA) 10 MG tablet  Glucosamine-Chondroitin-MSM 4936-3589-029 MG PACK  mometasone (ELOCON) 0.1 % external ointment  Multiple Vitamins-Minerals (MULTIVITAMIN OR)  Psyllium (METAMUCIL PO)  Vitamin D, Cholecalciferol, 1000 units CAPS          Review of Systems   Constitutional:  Negative for chills and fever.   Respiratory:  Negative for cough and shortness of breath.    All other systems reviewed and are negative.      Physical Exam   BP: (!) 158/88  Pulse: 75  Temp: 97.5  F (36.4  C)  Resp: 16  Height: 160 cm (5' 3\")  Weight: 76.1 kg (167 lb 12.3 oz)  SpO2: 96 %      Physical Exam  Constitutional:       General: She is not in acute distress.     Appearance: Normal appearance. She is obese.   HENT:      Head: Normocephalic and atraumatic.      Right Ear: External ear normal.      Left Ear: External ear normal.      Nose: Nose normal.   Eyes:      General: No scleral icterus.     Extraocular Movements: Extraocular movements intact.   Pulmonary:      Effort: Pulmonary effort is normal. No respiratory distress.   Abdominal:      General: There is no distension.      Palpations: Abdomen is soft.      Tenderness: There is no abdominal " tenderness.      Hernia: No hernia is present.   Musculoskeletal:         General: No deformity or signs of injury.   Skin:     General: Skin is dry.      Capillary Refill: Capillary refill takes less than 2 seconds.      Coloration: Skin is not jaundiced.   Neurological:      General: No focal deficit present.      Mental Status: She is alert and oriented to person, place, and time.   Psychiatric:         Mood and Affect: Mood normal.         Behavior: Behavior normal.         ED Course        Procedures             Critical Care time:               Results for orders placed or performed during the hospital encounter of 03/21/25 (from the past 24 hours)   UA with Microscopic reflex to Culture    Specimen: Urine, Midstream   Result Value Ref Range    Color Urine Straw Colorless, Straw, Light Yellow, Yellow    Appearance Urine Clear Clear    Glucose Urine Negative Negative mg/dL    Bilirubin Urine Negative Negative    Ketones Urine Negative Negative mg/dL    Specific Gravity Urine 1.009 1.003 - 1.035    Blood Urine Negative Negative    pH Urine 5.5 4.7 - 8.0    Protein Albumin Urine Negative Negative mg/dL    Urobilinogen Urine Normal Normal, 2.0 mg/dL    Nitrite Urine Negative Negative    Leukocyte Esterase Urine Negative Negative    Mucus Urine Present (A) None Seen /LPF    RBC Urine <1 <=2 /HPF    WBC Urine 1 <=5 /HPF    Squamous Epithelials Urine 0 <=1 /HPF    Narrative    Urine Culture not indicated   CBC with platelets differential    Narrative    The following orders were created for panel order CBC with platelets differential.  Procedure                               Abnormality         Status                     ---------                               -----------         ------                     CBC with platelets and ...[[id number]]  Abnormal            Final result                 Please view results for these tests on the individual orders.   Comprehensive metabolic panel   Result Value Ref Range     Sodium 140 135 - 145 mmol/L    Potassium 4.3 3.4 - 5.3 mmol/L    Carbon Dioxide (CO2) 25 22 - 29 mmol/L    Anion Gap 14 7 - 15 mmol/L    Urea Nitrogen 24.4 (H) 8.0 - 23.0 mg/dL    Creatinine 1.50 (H) 0.51 - 0.95 mg/dL    GFR Estimate 37 (L) >60 mL/min/1.73m2    Calcium 10.0 8.8 - 10.4 mg/dL    Chloride 101 98 - 107 mmol/L    Glucose 137 (H) 70 - 99 mg/dL    Alkaline Phosphatase 100 40 - 150 U/L    AST 34 0 - 45 U/L    ALT 22 0 - 50 U/L    Protein Total 8.2 6.4 - 8.3 g/dL    Albumin 4.4 3.5 - 5.2 g/dL    Bilirubin Total 0.3 <=1.2 mg/dL   Lipase   Result Value Ref Range    Lipase 59 13 - 60 U/L   INR   Result Value Ref Range    INR 0.92 0.85 - 1.15   CBC with platelets and differential   Result Value Ref Range    WBC Count 11.4 (H) 4.0 - 11.0 10e3/uL    RBC Count 4.68 3.80 - 5.20 10e6/uL    Hemoglobin 13.4 11.7 - 15.7 g/dL    Hematocrit 39.8 35.0 - 47.0 %    MCV 85 78 - 100 fL    MCH 28.6 26.5 - 33.0 pg    MCHC 33.7 31.5 - 36.5 g/dL    RDW 13.3 10.0 - 15.0 %    Platelet Count 302 150 - 450 10e3/uL    % Neutrophils 53 %    % Lymphocytes 32 %    % Monocytes 7 %    % Eosinophils 6 %    % Basophils 1 %    % Immature Granulocytes 0 %    NRBCs per 100 WBC 0 <1 /100    Absolute Neutrophils 6.1 1.6 - 8.3 10e3/uL    Absolute Lymphocytes 3.7 0.8 - 5.3 10e3/uL    Absolute Monocytes 0.8 0.0 - 1.3 10e3/uL    Absolute Eosinophils 0.7 0.0 - 0.7 10e3/uL    Absolute Basophils 0.1 0.0 - 0.2 10e3/uL    Absolute Immature Granulocytes 0.0 <=0.4 10e3/uL    Absolute NRBCs 0.0 10e3/uL   [place] Draw    Narrative    The following orders were created for panel order [place] Draw.  Procedure                               Abnormality         Status                     ---------                               -----------         ------                     Extra Red Top Tube[[id number]]                              Final result               Extra Green Top (Maia...[[id number]]                      Final result                 Please view results  for these tests on the individual orders.   Extra Red Top Tube   Result Value Ref Range    Hold Specimen JIC    Extra Green Top (Lithium Heparin) Tube   Result Value Ref Range    Hold Specimen JIC    CT Abdomen Pelvis w Contrast    Narrative    EXAM: CT ABDOMEN PELVIS W CONTRAST  LOCATION: [facility]  DATE: 3/21/2025    INDICATION: Upper abdominal discomfort. Hysterectomy.  COMPARISON: None.  TECHNIQUE: CT scan of the abdomen and pelvis was performed following injection of IV contrast. Multiplanar reformats were obtained. Dose reduction techniques were used.  CONTRAST: Isovue 370 82mL    FINDINGS:   LOWER CHEST: Small hiatal hernia with a small amount of retained or refluxed fluid within the distal esophagus.    HEPATOBILIARY: Cholelithiasis. No biliary ductal dilation. No liver lesions.    PANCREAS: Fatty infiltration of the pancreas predominantly within the head. No peripancreatic inflammation.    SPLEEN: Normal.    ADRENAL GLANDS: Normal.    KIDNEYS/BLADDER: Symmetric renal enhancement. No hydronephrosis. Two tiny foci of gas within the bladder lumen. Bladder is otherwise normal.    BOWEL: No bowel obstruction or inflammation. Normal appendix. Numerous noninflamed descending and sigmoid colonic diverticuli.    LYMPH NODES: Normal.    VASCULATURE: Patent portal, splenic, and superior mesenteric veins. Mild aortic atherosclerosis. No abdominal aortic aneurysm.    PELVIC ORGANS: Absent uterus.    MUSCULOSKELETAL: Multilevel degenerative changes of the spine. Grade 1 anterolisthesis of L4 on L5. No acute bony abnormality.      Impression    IMPRESSION:     1.  No acute abnormality or specific cause of the patient's symptoms are identified.    2.  Cholelithiasis.    3.  Distal colonic diverticulosis. No inflamed diverticuli.    4.  Two tiny nonspecific foci of gas within the bladder, possibly related to recent instrumentation. Consider correlation with urinalysis.    5.  Small hiatal hernia with a small  amount of retained or refluxed fluid within the distal esophagus.       Medications   sodium chloride 0.9% BOLUS 1,000 mL (1,000 mLs Intravenous $New Bag 3/21/25 2133)   sodium chloride (PF) 0.9% PF flush 50 mL (50 mLs Intravenous $Given 3/21/25 2050)   iopamidol (ISOVUE-370) solution 82 mL (82 mLs Intravenous $Given 3/21/25 2050)       Assessments & Plan (with Medical Decision Making)     I have reviewed the nursing notes.    I have reviewed the findings, diagnosis, plan and need for follow up with the patient.          Medical Decision Making  The patient's presentation was of moderate complexity (an undiagnosed new problem with uncertain prognosis).    The patient's evaluation involved:  ordering and/or review of 3+ test(s) in this encounter (see separate area of note for details)    The patient's management necessitated moderate risk (IV contrast administration).    68 yo f here w/ vague abd discomfort, will obtain ct scan to r/o obvious mass or other intraabdominal catastrophe however suspicion for that is low.    Reassessment  ROHINI. CT fine. Will give 1L NS then order repeat bmp for next week, will message her primary to keep them in the loop.    New Prescriptions    No medications on file       Final diagnoses:   Generalized abdominal pain   Acute kidney injury       3/21/2025   [facility]       Rene Eduardo MD  03/21/25 2139

## 2025-03-24 ENCOUNTER — TELEPHONE (OUTPATIENT)
Dept: FAMILY MEDICINE | Facility: OTHER | Age: 70
End: 2025-03-24

## 2025-03-24 ENCOUNTER — LAB (OUTPATIENT)
Dept: LAB | Facility: HOSPITAL | Age: 70
End: 2025-03-24
Payer: COMMERCIAL

## 2025-03-24 DIAGNOSIS — N17.9 ACUTE KIDNEY INJURY: ICD-10-CM

## 2025-03-24 LAB
ANION GAP SERPL CALCULATED.3IONS-SCNC: 14 MMOL/L (ref 7–15)
BUN SERPL-MCNC: 23.2 MG/DL (ref 8–23)
CALCIUM SERPL-MCNC: 9.4 MG/DL (ref 8.8–10.4)
CHLORIDE SERPL-SCNC: 100 MMOL/L (ref 98–107)
CREAT SERPL-MCNC: 1.24 MG/DL (ref 0.51–0.95)
EGFRCR SERPLBLD CKD-EPI 2021: 47 ML/MIN/1.73M2
GLUCOSE SERPL-MCNC: 107 MG/DL (ref 70–99)
HCO3 SERPL-SCNC: 23 MMOL/L (ref 22–29)
POTASSIUM SERPL-SCNC: 4.4 MMOL/L (ref 3.4–5.3)
SODIUM SERPL-SCNC: 137 MMOL/L (ref 135–145)

## 2025-03-24 PROCEDURE — 80048 BASIC METABOLIC PNL TOTAL CA: CPT

## 2025-03-24 PROCEDURE — 36415 COLL VENOUS BLD VENIPUNCTURE: CPT

## 2025-03-24 NOTE — TELEPHONE ENCOUNTER
Please call for lab recheck. Per ED, needs recheck of renal function. I placed labs, please schedule labs in 1-2 weeks.

## 2025-03-24 NOTE — TELEPHONE ENCOUNTER
----- Message from Rene Eduardo sent at 3/21/2025  9:39 PM CDT -----  FYI, this patient came in with vague abdominal pain, workup negative but her Cr took a dive so I have her a liter of fluids and put her in for a recheck. She never had vomiting or diarrhea.  Rene

## 2025-03-29 ENCOUNTER — HOSPITAL ENCOUNTER (EMERGENCY)
Facility: HOSPITAL | Age: 70
Discharge: HOME | End: 2025-03-29
Attending: NURSE PRACTITIONER | Admitting: NURSE PRACTITIONER
Payer: COMMERCIAL

## 2025-03-29 VITALS
HEART RATE: 105 BPM | OXYGEN SATURATION: 95 % | SYSTOLIC BLOOD PRESSURE: 117 MMHG | HEIGHT: 63 IN | BODY MASS INDEX: 29.23 KG/M2 | TEMPERATURE: 98.6 F | WEIGHT: 165 LBS | DIASTOLIC BLOOD PRESSURE: 77 MMHG | RESPIRATION RATE: 16 BRPM

## 2025-03-29 DIAGNOSIS — R05.1 ACUTE COUGH: Primary | ICD-10-CM

## 2025-03-29 DIAGNOSIS — N30.01 ACUTE CYSTITIS WITH HEMATURIA: ICD-10-CM

## 2025-03-29 LAB
ALBUMIN UR-MCNC: NEGATIVE MG/DL
APPEARANCE UR: ABNORMAL
BACTERIA #/AREA URNS HPF: ABNORMAL /HPF
BILIRUB UR QL STRIP: NEGATIVE
COLOR UR AUTO: ABNORMAL
FLUAV RNA SPEC QL NAA+PROBE: NEGATIVE
FLUBV RNA RESP QL NAA+PROBE: NEGATIVE
GLUCOSE UR STRIP-MCNC: NEGATIVE MG/DL
HGB UR QL STRIP: ABNORMAL
KETONES UR STRIP-MCNC: NEGATIVE MG/DL
LEUKOCYTE ESTERASE UR QL STRIP: ABNORMAL
NITRATE UR QL: NEGATIVE
PH UR STRIP: 6 [PH] (ref 4.7–8)
RBC URINE: 3 /HPF
RSV RNA SPEC NAA+PROBE: NEGATIVE
SARS-COV-2 RNA RESP QL NAA+PROBE: NEGATIVE
SP GR UR STRIP: 1 (ref 1–1.03)
SQUAMOUS EPITHELIAL: 0 /HPF
UROBILINOGEN UR STRIP-MCNC: NORMAL MG/DL
WBC CLUMPS #/AREA URNS HPF: PRESENT /HPF
WBC URINE: 48 /HPF

## 2025-03-29 PROCEDURE — 81003 URINALYSIS AUTO W/O SCOPE: CPT | Performed by: NURSE PRACTITIONER

## 2025-03-29 PROCEDURE — 87637 SARSCOV2&INF A&B&RSV AMP PRB: CPT | Performed by: NURSE PRACTITIONER

## 2025-03-29 PROCEDURE — 87186 SC STD MICRODIL/AGAR DIL: CPT | Performed by: NURSE PRACTITIONER

## 2025-03-29 PROCEDURE — G0463 HOSPITAL OUTPT CLINIC VISIT: HCPCS

## 2025-03-29 PROCEDURE — 99213 OFFICE O/P EST LOW 20 MIN: CPT | Performed by: NURSE PRACTITIONER

## 2025-03-29 RX ORDER — BENZONATATE 100 MG/1
100 CAPSULE ORAL 3 TIMES DAILY PRN
Qty: 30 CAPSULE | Refills: 0 | Status: SHIPPED | OUTPATIENT
Start: 2025-03-29

## 2025-03-29 RX ORDER — CEFDINIR 300 MG/1
300 CAPSULE ORAL 2 TIMES DAILY
Qty: 14 CAPSULE | Refills: 0 | Status: SHIPPED | OUTPATIENT
Start: 2025-03-29 | End: 2025-04-05

## 2025-03-29 RX ORDER — PREDNISONE 20 MG/1
TABLET ORAL
Qty: 10 TABLET | Refills: 0 | Status: SHIPPED | OUTPATIENT
Start: 2025-03-29

## 2025-03-29 ASSESSMENT — ENCOUNTER SYMPTOMS
DYSURIA: 1
COUGH: 1

## 2025-03-29 ASSESSMENT — ACTIVITIES OF DAILY LIVING (ADL): ADLS_ACUITY_SCORE: 41

## 2025-03-29 NOTE — ED PROVIDER NOTES
History     Chief Complaint   Patient presents with    Cough     HPI  Katelyn Manley is a 69 year old female who Presents to urgent care for evaluation of URI symptoms that started 3 days ago.  Patient reports a cough and congestion.  Notes that she is coughing so much that she is having pain to her abdominal muscles with coughing.  Denies significant shortness of breath but does report increased use of her albuterol inhaler.  History of asthma.  No chest pain.  No known fevers at home.  Has been taking ibuprofen.  No known recent ill contacts.  No current tobacco use.  No known history of COPD.    Additionally, patient is requesting testing for urinary tract infection.  Notes that she had some pain with urination this morning when she woke up.  No abdominal pain, fevers or chills.  No flank pain.  No hematuria.    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      Status post vaginal hysterectomy 09/12/2016     Priority: Medium     Bso,sling,post repair, [id number]      Dyslipidemia 05/15/2014     Priority: Medium    Lichen sclerosus 09/16/2013      Priority: Medium    Leukoplakia 07/03/2013     Priority: Medium    Pre-diabetes 05/06/2013     Priority: Medium        Past Medical History:    Past Medical History:   Diagnosis Date    Adjustment disorder with mixed anxiety and depressed mood 10/26/2020    Chronic kidney disease, stage 3a (H) 11/23/2021    Cyst of Bartholin's gland 10/22/2007    Depressive disorder 2010    Diabetes (H) 2007    Diverticulosis of large intestine without hemorrhage 10/23/2018    Dyslipidemia 05/15/2014    Endometrial hyperplasia without atypia, simple     Glucose intolerance (impaired glucose tolerance) 790.22    Hypercholesterolemia 08/23/2011    Lichen sclerosus 09/16/2013    Menopause 05/06/2013    Osteoarthrosis, unspecified whether generalized or localized, lower leg 07/09/2002    Pre-diabetes 05/06/2013    Reactive airway disease that is not asthma 04/04/2018    Special screening for malignant neoplasms, colon 10/15/2004    Uncomplicated asthma 1990       Past Surgical History:    Past Surgical History:   Procedure Laterality Date    ARTHROSCOPY KNEE Left     x 2    BIOPSY  2013    For lichen sclerosis diagnosis    COLONOSCOPY  2004    COLONOSCOPY  02/03/2012    Dr Sanchez/ sigmoid diverticular dz    COLPORRHAPHY POSTERIOR N/A 02/18/2015    Procedure: COLPORRHAPHY POSTERIOR;  Surgeon: Vasquez Sauer MD;  Location: [facility]    CYSTOSCOPY N/A 02/18/2015    Procedure: CYSTOSCOPY;  Surgeon: Vasquez Sauer MD;  Location: [facility]    DILATION AND CURETTAGE, HYSTEROSCOPY DIAGNOSTIC, COMBINED  05/07/2014    Procedure: COMBINED DILATION AND CURETTAGE, HYSTEROSCOPY DIAGNOSTIC;  Surgeon: Vasquez Sauer MD;  Location: [facility]    EYE SURGERY Left 08/2016    macular hole repair    HYSTERECTOMY VAGINAL, BILATERAL SALPINGO-OOPHERECTOMY, COMBINED N/A 02/18/2015    Procedure: COMBINED HYSTERECTOMY VAGINAL, SALPINGO-OOPHORECTOMY;  Surgeon: Vasquez Sauer MD;  Location: [facility]    PHACOEMULSIFICATION WITH STANDARD INTRAOCULAR LENS IMPLANT Left 04/24/2018     "Procedure: PHACOEMULSIFICATION WITH STANDARD INTRAOCULAR LENS IMPLANT;  PHACOEMULSIFICATION CATARACT EXTRACTION POSTERIOR CHAMBER LENS LEFT/TECNIS-TORIC, 10% CORINA;  Surgeon: Jerome Snow MD;  Location: [facility]    REPLACEMENT TOTAL KNEE Left 2004    SLING TRANSOBTURATOR N/A 02/18/2015    Procedure: SLING TRANSOBTURATOR;  Surgeon: Vasquez Sauer MD;  Location: [facility]       Family History:    Family History   Problem Relation Age of Onset    Other - See Comments Mother         [place] Granulometosis - cause of death    Other Cancer Mother         Wegeners granulomatosis    Coronary Artery Disease Father         Heart attack age 55    Cerebrovascular Disease Father 77        CVA - cause of death    Coronary Artery Disease Brother     Other - See Comments Brother         multiple sclerosis       Social History:  Marital Status:   [2]  Social History     Tobacco Use    Smoking status: Never     Passive exposure: Never    Smokeless tobacco: Never   Vaping Use    Vaping status: Never Used   Substance Use Topics    Alcohol use: Yes     Comment: Occasionally never daily    Drug use: Never        Medications:    albuterol (PROAIR HFA/PROVENTIL HFA/VENTOLIN HFA) 108 (90 Base) MCG/ACT inhaler  atorvastatin (LIPITOR) 10 MG tablet  benzonatate (TESSALON) 100 MG capsule  cefdinir (OMNICEF) 300 MG capsule  citalopram (CELEXA) 10 MG tablet  Glucosamine-Chondroitin-MSM 6608-3136-759 MG PACK  mometasone (ELOCON) 0.1 % external ointment  Multiple Vitamins-Minerals (MULTIVITAMIN OR)  predniSONE (DELTASONE) 20 MG tablet  Psyllium (METAMUCIL PO)  Vitamin D, Cholecalciferol, 1000 units CAPS          Review of Systems   Respiratory:  Positive for cough.    Genitourinary:  Positive for dysuria.   All other systems reviewed and are negative.      Physical Exam   BP: 117/77  Pulse: 105  Temp: 98.6  F (37  C)  Resp: 16  Height: 160 cm (5' 3\")  Weight: 74.8 kg (165 lb)  SpO2: 95 %      Physical Exam  Vitals and nursing note reviewed. "   Constitutional:       General: She is not in acute distress.     Appearance: Normal appearance. She is well-developed. She is not diaphoretic.   HENT:      Head: Normocephalic and atraumatic.      Right Ear: Tympanic membrane and ear canal normal.      Left Ear: Tympanic membrane and ear canal normal.      Nose: Nose normal.      Mouth/Throat:      Mouth: Mucous membranes are moist.   Eyes:      Conjunctiva/sclera: Conjunctivae normal.   Cardiovascular:      Rate and Rhythm: Normal rate and regular rhythm.      Heart sounds: Normal heart sounds.   Pulmonary:      Effort: Pulmonary effort is normal. No respiratory distress.      Breath sounds: Normal breath sounds. No wheezing, rhonchi or rales.   Abdominal:      General: Bowel sounds are normal.      Palpations: Abdomen is soft.      Tenderness: There is no abdominal tenderness. There is no right CVA tenderness, left CVA tenderness, guarding or rebound.   Genitourinary:     Comments: Deferred  Musculoskeletal:      Cervical back: Normal range of motion and neck supple.   Skin:     General: Skin is warm and dry.      Coloration: Skin is not pale.   Neurological:      Mental Status: She is alert and oriented to person, place, and time.         ED Course     ED Course as of 03/29/25 1133   Sat Mar 29, 2025   1111 I saw this patient regarding her concerns with cough and congestion.  Patient was to be discharged.  However, when the nurse went in to discharge her she requested to be tested for urinary tract infection.  Discharge on hold for now.     Procedures              Results for orders placed or performed during the hospital encounter of 03/29/25 (from the past 24 hours)   Influenza A/B, RSV and SARS-CoV2 PCR (COVID-19) Nasopharyngeal    Specimen: Nasopharyngeal; Swab   Result Value Ref Range    Influenza A PCR Negative Negative    Influenza B PCR Negative Negative    RSV PCR Negative Negative    SARS CoV2 PCR Negative Negative    Narrative    Testing was  performed using the Xpert Xpress CoV2/Flu/RSV Assay on the "[employer] GeneXpert Instrument. This test should be ordered for the detection of SARS-CoV2, influenza, and RSV viruses in individuals with signs and symptoms of respiratory tract infection. This test is for in vitro diagnostic use under the [place] FDA for laboratories certified under CLIA to perform high or moderate complexity testing. This test has been [place] FDA cleared. A negative result does not rule out the presence of PCR inhibitors in the specimen or target RNA in concentration below the limit of detection for the assay. If only one viral target is positive but coinfection with multiple targets is suspected, the sample should be re-tested with another FDA cleared, approved, or authorized test, if coninfection would change clinical management. This test was validated by the [facility]. These laboratories are certified under the Clinical Laboratory Improvement Amendments of 1988 (CLIA-88) as qualified to perfom high complexity laboratory testing.   UA Macroscopic with reflex to Microscopic and Culture    Specimen: Urine, Clean Catch   Result Value Ref Range    Color Urine Straw Colorless, Straw, Light Yellow, Yellow    Appearance Urine Slightly Cloudy (A) Clear    Glucose Urine Negative Negative mg/dL    Bilirubin Urine Negative Negative    Ketones Urine Negative Negative mg/dL    Specific Gravity Urine 1.004 1.003 - 1.035    Blood Urine Moderate (A) Negative    pH Urine 6.0 4.7 - 8.0    Protein Albumin Urine Negative Negative mg/dL    Urobilinogen Urine Normal Normal mg/dL    Nitrite Urine Negative Negative    Leukocyte Esterase Urine Large (A) Negative    Bacteria Urine Many (A) None Seen /HPF    WBC Clumps Urine Present (A) None Seen /HPF    RBC Urine 3 (H) <=2 /HPF    WBC Urine 48 (H) <=5 /HPF    Squamous Epithelials Urine 0 <=1 /HPF    Narrative    Urine Culture ordered based on laboratory criteria       Medications - No data to  display    Assessments & Plan (with Medical Decision Making)   69-year-old female that presented for evaluation of cough that started 3 days ago.  Respirations are even and unlabored.  Oxygen saturation was 95% on room air.  Clear lung sounds bilaterally.  Afebrile.  Negative for RSV, COVID-19 and influenza.  Cough is likely due to a viral infection which was discussed with patient.  Given her history of asthma we will treat with prednisone.  Also prescribed benzonatate.  Continue use of albuterol inhaler as needed.    Additionally, patient concerned about UTI symptoms that last couple of days.  Urinalysis shows signs of infection.  Urine culture pending.  Patient will be treated with cefdinir as prescribed.  Follow-up with primary doctor as needed.  Return to urgent care Emergency Department for any worsening or concerning symptoms.    I have reviewed the nursing notes.    I have reviewed the findings, diagnosis, plan and need for follow up with the patient.  This document was prepared using a combination of typing and voice generated software.  While every attempt was made for accuracy, spelling and grammatical errors may exist.         New Prescriptions    BENZONATATE (TESSALON) 100 MG CAPSULE    Take 1 capsule (100 mg) by mouth 3 times daily as needed for cough.    CEFDINIR (OMNICEF) 300 MG CAPSULE    Take 1 capsule (300 mg) by mouth 2 times daily for 7 days.    PREDNISONE (DELTASONE) 20 MG TABLET    Take two tablets (= 40mg) each day for 5 (five) days       Final diagnoses:   Acute cough   Acute cystitis with hematuria       3/29/2025   [facility]       Mpofu, Prudence, CNP  03/29/25 1147

## 2025-03-29 NOTE — ED TRIAGE NOTES
Pt presents with cough and congestion x 3 days. Denied fever, ear pain, throat pain, n/v and diarrhea. PT has been taking ibuprofen.

## 2025-03-29 NOTE — DISCHARGE INSTRUCTIONS
Your lungs sound clear today.  Oxygen saturation is also very good.  We will notify you of your results when they are available.    Take the prednisone as prescribed.  The take the benzonatate as needed for the cough.  Continue using your inhaler as needed.    Take the antibiotic as prescribed for your urinary tract infection.    Follow-up with your primary doctor as needed.  Return to urgent care or emergency department for any worsening or concerning symptoms.

## 2025-03-31 ENCOUNTER — APPOINTMENT (OUTPATIENT)
Dept: LAB | Facility: OTHER | Age: 70
End: 2025-03-31
Payer: COMMERCIAL

## 2025-03-31 ENCOUNTER — TELEPHONE (OUTPATIENT)
Dept: FAMILY MEDICINE | Facility: OTHER | Age: 70
End: 2025-03-31

## 2025-03-31 LAB — BACTERIA UR CULT: ABNORMAL

## 2025-03-31 NOTE — TELEPHONE ENCOUNTER
Lab called and stated patient showed up at lab because she received a phone call to come in and have labs rechecked. There are no orders in and no notes that I can see wanting her to be rechecked.

## 2025-04-06 ENCOUNTER — MYC MEDICAL ADVICE (OUTPATIENT)
Dept: FAMILY MEDICINE | Facility: OTHER | Age: 70
End: 2025-04-06

## 2025-04-07 ENCOUNTER — OFFICE VISIT (OUTPATIENT)
Dept: FAMILY MEDICINE | Facility: OTHER | Age: 70
End: 2025-04-07
Payer: COMMERCIAL

## 2025-04-07 ENCOUNTER — ANCILLARY PROCEDURE (OUTPATIENT)
Dept: GENERAL RADIOLOGY | Facility: OTHER | Age: 70
End: 2025-04-07
Payer: COMMERCIAL

## 2025-04-07 VITALS
HEIGHT: 63 IN | BODY MASS INDEX: 29.59 KG/M2 | RESPIRATION RATE: 16 BRPM | TEMPERATURE: 97.7 F | SYSTOLIC BLOOD PRESSURE: 124 MMHG | WEIGHT: 167 LBS | DIASTOLIC BLOOD PRESSURE: 76 MMHG | HEART RATE: 82 BPM | OXYGEN SATURATION: 98 %

## 2025-04-07 DIAGNOSIS — R05.2 SUBACUTE COUGH: ICD-10-CM

## 2025-04-07 DIAGNOSIS — R06.09 DOE (DYSPNEA ON EXERTION): ICD-10-CM

## 2025-04-07 DIAGNOSIS — R79.82 CRP ELEVATED: ICD-10-CM

## 2025-04-07 DIAGNOSIS — R19.8 ALTERED BOWEL FUNCTION: ICD-10-CM

## 2025-04-07 DIAGNOSIS — R53.81 MALAISE: Primary | ICD-10-CM

## 2025-04-07 DIAGNOSIS — N18.31 CHRONIC KIDNEY DISEASE, STAGE 3A (H): ICD-10-CM

## 2025-04-07 LAB
ALBUMIN SERPL BCG-MCNC: 4 G/DL (ref 3.5–5.2)
ALP SERPL-CCNC: 103 U/L (ref 40–150)
ALT SERPL W P-5'-P-CCNC: 26 U/L (ref 0–50)
ANION GAP SERPL CALCULATED.3IONS-SCNC: 9 MMOL/L (ref 7–15)
AST SERPL W P-5'-P-CCNC: 33 U/L (ref 0–45)
BASOPHILS # BLD AUTO: 0.1 10E3/UL (ref 0–0.2)
BASOPHILS NFR BLD AUTO: 1 %
BILIRUB SERPL-MCNC: 0.5 MG/DL
BUN SERPL-MCNC: 18.4 MG/DL (ref 8–23)
CALCIUM SERPL-MCNC: 9.3 MG/DL (ref 8.8–10.4)
CHLORIDE SERPL-SCNC: 103 MMOL/L (ref 98–107)
CREAT SERPL-MCNC: 1.19 MG/DL (ref 0.51–0.95)
CREAT UR-MCNC: 28.4 MG/DL
CRP SERPL-MCNC: 43.81 MG/L
EGFRCR SERPLBLD CKD-EPI 2021: 49 ML/MIN/1.73M2
EOSINOPHIL # BLD AUTO: 0.6 10E3/UL (ref 0–0.7)
EOSINOPHIL NFR BLD AUTO: 6 %
ERYTHROCYTE [DISTWIDTH] IN BLOOD BY AUTOMATED COUNT: 13.3 % (ref 10–15)
ERYTHROCYTE [SEDIMENTATION RATE] IN BLOOD BY WESTERGREN METHOD: 13 MM/HR (ref 0–30)
GLUCOSE SERPL-MCNC: 99 MG/DL (ref 70–99)
HCO3 SERPL-SCNC: 25 MMOL/L (ref 22–29)
HCT VFR BLD AUTO: 40.8 % (ref 35–47)
HGB BLD-MCNC: 13.5 G/DL (ref 11.7–15.7)
IMM GRANULOCYTES # BLD: 0.1 10E3/UL
IMM GRANULOCYTES NFR BLD: 1 %
LYMPHOCYTES # BLD AUTO: 3 10E3/UL (ref 0.8–5.3)
LYMPHOCYTES NFR BLD AUTO: 32 %
MCH RBC QN AUTO: 28.1 PG (ref 26.5–33)
MCHC RBC AUTO-ENTMCNC: 33.1 G/DL (ref 31.5–36.5)
MCV RBC AUTO: 85 FL (ref 78–100)
MICROALBUMIN UR-MCNC: <12 MG/L
MICROALBUMIN/CREAT UR: NORMAL MG/G{CREAT}
MONOCYTES # BLD AUTO: 0.9 10E3/UL (ref 0–1.3)
MONOCYTES NFR BLD AUTO: 9 %
NEUTROPHILS # BLD AUTO: 4.9 10E3/UL (ref 1.6–8.3)
NEUTROPHILS NFR BLD AUTO: 52 %
NRBC # BLD AUTO: 0 10E3/UL
NRBC BLD AUTO-RTO: 0 /100
PLATELET # BLD AUTO: 305 10E3/UL (ref 150–450)
POTASSIUM SERPL-SCNC: 4.1 MMOL/L (ref 3.4–5.3)
PROT SERPL-MCNC: 8.2 G/DL (ref 6.4–8.3)
RBC # BLD AUTO: 4.8 10E6/UL (ref 3.8–5.2)
SODIUM SERPL-SCNC: 137 MMOL/L (ref 135–145)
TSH SERPL DL<=0.005 MIU/L-ACNC: 1.46 UIU/ML (ref 0.3–4.2)
WBC # BLD AUTO: 9.6 10E3/UL (ref 4–11)

## 2025-04-07 PROCEDURE — 82570 ASSAY OF URINE CREATININE: CPT | Mod: ZL

## 2025-04-07 PROCEDURE — 80053 COMPREHEN METABOLIC PANEL: CPT | Mod: ZL

## 2025-04-07 PROCEDURE — 80051 ELECTROLYTE PANEL: CPT | Mod: ZL

## 2025-04-07 PROCEDURE — 71046 X-RAY EXAM CHEST 2 VIEWS: CPT | Mod: 26 | Performed by: RADIOLOGY

## 2025-04-07 PROCEDURE — 74019 RADEX ABDOMEN 2 VIEWS: CPT | Mod: TC

## 2025-04-07 PROCEDURE — 82040 ASSAY OF SERUM ALBUMIN: CPT | Mod: ZL

## 2025-04-07 PROCEDURE — G0463 HOSPITAL OUTPT CLINIC VISIT: HCPCS

## 2025-04-07 PROCEDURE — 84443 ASSAY THYROID STIM HORMONE: CPT | Mod: ZL

## 2025-04-07 PROCEDURE — 85652 RBC SED RATE AUTOMATED: CPT | Mod: ZL

## 2025-04-07 PROCEDURE — G0463 HOSPITAL OUTPT CLINIC VISIT: HCPCS | Mod: 25

## 2025-04-07 PROCEDURE — 36415 COLL VENOUS BLD VENIPUNCTURE: CPT | Mod: ZL

## 2025-04-07 PROCEDURE — 74019 RADEX ABDOMEN 2 VIEWS: CPT | Mod: 26 | Performed by: RADIOLOGY

## 2025-04-07 PROCEDURE — 86140 C-REACTIVE PROTEIN: CPT | Mod: ZL

## 2025-04-07 PROCEDURE — 85018 HEMOGLOBIN: CPT | Mod: ZL

## 2025-04-07 PROCEDURE — 82043 UR ALBUMIN QUANTITATIVE: CPT | Mod: ZL

## 2025-04-07 PROCEDURE — 85004 AUTOMATED DIFF WBC COUNT: CPT | Mod: ZL

## 2025-04-07 PROCEDURE — 71046 X-RAY EXAM CHEST 2 VIEWS: CPT | Mod: TC

## 2025-04-07 ASSESSMENT — PAIN SCALES - GENERAL: PAINLEVEL_OUTOF10: MILD PAIN (2)

## 2025-04-07 ASSESSMENT — ASTHMA QUESTIONNAIRES: ACT_TOTALSCORE: 25

## 2025-04-07 NOTE — PROGRESS NOTES
Patient had visit today with ANNETTE Tinsley.  No outreach provided.  Yumiko Moore, RN  Care Coordination

## 2025-04-07 NOTE — PROGRESS NOTES
Assessment & Plan     Malaise/Subacute cough  Feeling overall unwell over the past month. Patient's main concern is underlying staph infection given recent staph infection in family member as well as animal with chronic staph. She is non-toxic appearing today with normal vitals signs. No reported fevers, no tachycardia. Has subacute cough which is somewhat improved following recent course of prednisone. See below related to change in bowel habits, otherwise no focal symptoms. Will start with laboratory evaluation including inflammatory markers as below, as well as chest xray and abdominal xray. Based on results will determine further plan. Short term follow-up scheduled. Consider blood cultures, CT chest/abd/pelvis. She is up to date on colon and breast cancer screening.  - CBC with platelets and differential  - Comprehensive metabolic panel (BMP + Alb, Alk Phos, ALT, AST, Total. Bili, TP)  - CRP, inflammation  - ESR: Erythrocyte sedimentation rate  - TSH with free T4 reflex  - XR CHEST 2 VW (Clinic Performed)    Altered bowel function  With intermittent abdominal cramping. Continues to use daily metamucil. No blood. Will obtain xray today, pending. Negative abdominal CT on 3/21. Consider endoscopy.  - XR ABDOMEN 2 VW (Clinic Performed)    Chronic kidney disease, stage 3a (H)  Repeat CMP pending.  - Albumin Random Urine Quantitative with Creat Ratio    AL (dyspnea on exertion)  - Echocardiogram Complete      Return in about 2 weeks (around 4/21/2025) for Follow up.    Katya Zepeda is a 70 year old, presenting for the following health issues:  Infection        4/7/2025     8:50 AM   Additional Questions   Roomed by Coral White     History of Present Illness       Back Pain:  She presents for follow up of back pain. Patient's back pain is a new problem.    Original cause of back pain: not sure  First noticed back pain: more than 1 month ago  Patient feels back pain: comes and goesLocation of back pain:   Right lower back and left lower back  Description of back pain: dull ache  Back pain spreads: nowhere    Since patient first noticed back pain, pain is: unchanged  Does back pain interfere with her job:  Not applicable  On a scale of 1-10 (10 being the worst), patient describes pain as:  3  What makes back pain worse: nothing   Acupuncture: not tried  Acetaminophen: not tried  Activity or exercise: not tried  Chiropractor:  Not tried  Cold: not tried  Heat: not tried  Massage: not tried  Muscle relaxants: not tried  NSAIDS: not tried  Opioids: not tried  Physical Therapy: not tried  Rest: not tried  Steroid Injection: not tried  Stretching: not tried  Surgery: not tried  TENS unit: not tried  Topical pain relievers: not tried  Other healthcare providers patient is seeing for back pain: Chiropractor    CKD: She uses over the counter pain medication, including IBUPROFEN, a few times a month.    Reason for visit:  Concern i might have a blood staph infection    She eats 2-3 servings of fruits and vegetables daily.She consumes 0 sweetened beverage(s) daily.She exercises with enough effort to increase her heart rate 9 or less minutes per day.  She exercises with enough effort to increase her heart rate 3 or less days per week.   She is taking medications regularly.        Concern - Staph infection  Onset: Sept 2024  Description: Abd pain, chills, cough, LOW BACK PAIN  Intensity: moderate  Progression of Symptoms:  same  Accompanying Signs & Symptoms: cough, abd pain, chills  Previous history of similar problem: No  Precipitating factors:        Worsened by: NA  Alleviating factors:        Improved by: na  Therapies tried and outcome:  none     - Son-in law, 40 yro not feeling well for a few months this past fall  - Week before [place], had stroke. Septic with staph infection, endocarditis and with valve replacements.  - Sept. 2024, notes dog has chronic staph infection with flared in Sept.  - Had cough since Sept,  "treated by PCP with antibiotics  - Early Feb went to [place], better cough while there.  - Returned here mid March - cough returned again.  - Bowels have changed, more constipation.  - Abdominal pain and cramping - seen in ER on 3/21 with CT abdomen- negative.  - ER on 3/29, placed on prednisone for cough and cefdinir for uti.  - Overall feeling unwell, \"something is not right.\"  - No fevers, ongoing chills.   - Flashes of nausea, no vomiting. Appetite stable.  - Taking metamucil, marbles in the morning. Cramping prior to BM with formed BM afternoon. No blood. No diarrhea.  - No chest pain, palpitations, shortness of breath.   - Daughter called yesterday, wondering if patient has staph infection contributing to symptoms, prompting apt today.    Review of Systems  Constitutional, neuro, ENT, endocrine, pulmonary, cardiac, gastrointestinal, genitourinary, musculoskeletal, integument and psychiatric systems are negative, except as otherwise noted.      Objective    /76 (BP Location: Left arm, Patient Position: Sitting, Cuff Size: Adult Regular)   Pulse 82   Temp 97.7  F (36.5  C) (Tympanic)   Resp 16   Ht 1.6 m (5' 3\")   Wt 75.8 kg (167 lb)   SpO2 98%   BMI 29.58 kg/m    Body mass index is 29.58 kg/m .    Physical Exam   GENERAL: alert and no distress  EYES: Eyes grossly normal to inspection, PERRL and conjunctivae and sclerae normal  HENT: ear canals and TM's normal, nose and mouth without ulcers or lesions  NECK: no adenopathy, no asymmetry, masses, or scars  RESP: lungs clear to auscultation - no rales, rhonchi or wheezes  CV: regular rate and rhythm, normal S1 S2, no S3 or S4, no murmur, click or rub, no peripheral edema  ABDOMEN: soft, nontender, no hepatosplenomegaly, no masses and bowel sounds normal  MS: no gross musculoskeletal defects noted, no edema  SKIN: no suspicious lesions or rashes  NEURO: Normal strength and tone, mentation intact and speech normal  PSYCH: mentation appears normal, affect " normal/bright          Signed Electronically by: LUIS Kilgore CNP

## 2025-04-11 ENCOUNTER — HOSPITAL ENCOUNTER (OUTPATIENT)
Dept: CARDIOLOGY | Facility: HOSPITAL | Age: 70
Discharge: HOME | End: 2025-04-11
Payer: COMMERCIAL

## 2025-04-11 ENCOUNTER — LAB (OUTPATIENT)
Dept: LAB | Facility: OTHER | Age: 70
End: 2025-04-11
Payer: COMMERCIAL

## 2025-04-11 DIAGNOSIS — R06.09 DOE (DYSPNEA ON EXERTION): ICD-10-CM

## 2025-04-11 DIAGNOSIS — R53.81 MALAISE: ICD-10-CM

## 2025-04-11 DIAGNOSIS — R79.82 CRP ELEVATED: ICD-10-CM

## 2025-04-11 LAB
BASOPHILS # BLD AUTO: 0.1 10E3/UL (ref 0–0.2)
BASOPHILS NFR BLD AUTO: 1 %
CRP SERPL-MCNC: 7.07 MG/L
EOSINOPHIL # BLD AUTO: 0.6 10E3/UL (ref 0–0.7)
EOSINOPHIL NFR BLD AUTO: 5 %
ERYTHROCYTE [DISTWIDTH] IN BLOOD BY AUTOMATED COUNT: 13.2 % (ref 10–15)
HCT VFR BLD AUTO: 39.5 % (ref 35–47)
HGB BLD-MCNC: 13.2 G/DL (ref 11.7–15.7)
IMM GRANULOCYTES # BLD: 0 10E3/UL
IMM GRANULOCYTES NFR BLD: 0 %
LVEF ECHO: NORMAL
LYMPHOCYTES # BLD AUTO: 3.8 10E3/UL (ref 0.8–5.3)
LYMPHOCYTES NFR BLD AUTO: 35 %
MCH RBC QN AUTO: 28.3 PG (ref 26.5–33)
MCHC RBC AUTO-ENTMCNC: 33.4 G/DL (ref 31.5–36.5)
MCV RBC AUTO: 85 FL (ref 78–100)
MONOCYTES # BLD AUTO: 1 10E3/UL (ref 0–1.3)
MONOCYTES NFR BLD AUTO: 10 %
NEUTROPHILS # BLD AUTO: 5.3 10E3/UL (ref 1.6–8.3)
NEUTROPHILS NFR BLD AUTO: 49 %
NRBC # BLD AUTO: 0 10E3/UL
NRBC BLD AUTO-RTO: 0 /100
PLATELET # BLD AUTO: 309 10E3/UL (ref 150–450)
RBC # BLD AUTO: 4.67 10E6/UL (ref 3.8–5.2)
WBC # BLD AUTO: 10.8 10E3/UL (ref 4–11)

## 2025-04-11 PROCEDURE — 93306 TTE W/DOPPLER COMPLETE: CPT

## 2025-04-11 PROCEDURE — 86140 C-REACTIVE PROTEIN: CPT | Mod: ZL

## 2025-04-11 PROCEDURE — 85025 COMPLETE CBC W/AUTO DIFF WBC: CPT | Mod: ZL

## 2025-04-11 PROCEDURE — 36415 COLL VENOUS BLD VENIPUNCTURE: CPT | Mod: ZL

## 2025-04-11 PROCEDURE — 93306 TTE W/DOPPLER COMPLETE: CPT | Mod: 26 | Performed by: INTERNAL MEDICINE

## 2025-04-21 ENCOUNTER — OFFICE VISIT (OUTPATIENT)
Dept: FAMILY MEDICINE | Facility: OTHER | Age: 70
End: 2025-04-21
Payer: COMMERCIAL

## 2025-04-21 VITALS
HEART RATE: 78 BPM | SYSTOLIC BLOOD PRESSURE: 98 MMHG | OXYGEN SATURATION: 98 % | WEIGHT: 169.5 LBS | BODY MASS INDEX: 30.03 KG/M2 | TEMPERATURE: 98.3 F | DIASTOLIC BLOOD PRESSURE: 70 MMHG | RESPIRATION RATE: 16 BRPM

## 2025-04-21 DIAGNOSIS — R53.81 MALAISE: Primary | ICD-10-CM

## 2025-04-21 DIAGNOSIS — R05.2 SUBACUTE COUGH: ICD-10-CM

## 2025-04-21 PROCEDURE — G0463 HOSPITAL OUTPT CLINIC VISIT: HCPCS

## 2025-04-21 ASSESSMENT — PAIN SCALES - GENERAL: PAINLEVEL_OUTOF10: NO PAIN (0)

## 2025-04-21 NOTE — PROGRESS NOTES
Assessment & Plan     Malaise/Subacute cough  See previous note from office visit on 4/7. CRP trending down when rechecked on 4/11, to 7 (had completed course of prednisone recent to first elevated CRP). Symptoms have now entirely resolved. Defer further labs today, has physical scheduled in 2 weeks. Patient to call with new symptoms or concerns.      Katya Zepeda is a 70 year old, presenting for the following health issues:  Follow Up        4/21/2025     1:22 PM   Additional Questions   Roomed by kris bower lpn   Accompanied by self     History of Present Illness       Back Pain:  She presents for follow up of back pain. Patient's back pain is a recurring problem.  Location of back pain:  Right lower back and left lower back  Description of back pain: dull ache  Back pain spreads: nowhere    Since patient first noticed back pain, pain is: gradually improving  Does back pain interfere with her job:  No       CKD: She uses over the counter pain medication, including IBUPROFEN, a few times a month.    Reason for visit:  Concern i might have a blood staph infection    She eats 2-3 servings of fruits and vegetables daily.She consumes 0 sweetened beverage(s) daily.She exercises with enough effort to increase her heart rate 10 to 19 minutes per day.  She exercises with enough effort to increase her heart rate 3 or less days per week.   She is taking medications regularly.      - Overall feeling well, entirely back to baseline  - No fevers, chills, abdominal pain, nausea, vomiting etc.  - Back pain is resolved.   - No chest pain or shortness of breath.    Review of Systems  Constitutional, neuro, ENT, endocrine, pulmonary, cardiac, gastrointestinal, genitourinary, musculoskeletal, integument and psychiatric systems are negative, except as otherwise noted.      Objective    BP 98/70 (BP Location: Left arm, Patient Position: Sitting, Cuff Size: Adult Regular)   Pulse 78   Temp 98.3  F (36.8  C) (Tympanic)    Resp 16   Wt 76.9 kg (169 lb 8 oz)   SpO2 98%   BMI 30.03 kg/m    Body mass index is 30.03 kg/m .    Physical Exam   GENERAL: alert and no distress  RESP: lungs clear to auscultation - no rales, rhonchi or wheezes  CV: regular rate and rhythm, normal S1 S2, no S3 or S4, no murmur, click or rub, no peripheral edema  MS: no gross musculoskeletal defects noted, no edema  SKIN: no suspicious lesions or rashes  NEURO: Normal strength and tone, mentation intact and speech normal  PSYCH: mentation appears normal, affect normal/bright          Signed Electronically by: LUIS Kilgore CNP

## 2025-05-02 SDOH — HEALTH STABILITY: PHYSICAL HEALTH: ON AVERAGE, HOW MANY MINUTES DO YOU ENGAGE IN EXERCISE AT THIS LEVEL?: 60 MIN

## 2025-05-02 SDOH — HEALTH STABILITY: PHYSICAL HEALTH: ON AVERAGE, HOW MANY DAYS PER WEEK DO YOU ENGAGE IN MODERATE TO STRENUOUS EXERCISE (LIKE A BRISK WALK)?: 2 DAYS

## 2025-05-02 ASSESSMENT — SOCIAL DETERMINANTS OF HEALTH (SDOH): HOW OFTEN DO YOU GET TOGETHER WITH FRIENDS OR RELATIVES?: TWICE A WEEK

## 2025-05-07 ENCOUNTER — OFFICE VISIT (OUTPATIENT)
Dept: FAMILY MEDICINE | Facility: OTHER | Age: 70
End: 2025-05-07
Attending: FAMILY MEDICINE
Payer: COMMERCIAL

## 2025-05-07 ENCOUNTER — RESULTS FOLLOW-UP (OUTPATIENT)
Dept: FAMILY MEDICINE | Facility: OTHER | Age: 70
End: 2025-05-07

## 2025-05-07 VITALS
DIASTOLIC BLOOD PRESSURE: 74 MMHG | TEMPERATURE: 97.1 F | HEART RATE: 84 BPM | SYSTOLIC BLOOD PRESSURE: 111 MMHG | RESPIRATION RATE: 16 BRPM | BODY MASS INDEX: 29.86 KG/M2 | HEIGHT: 63 IN | OXYGEN SATURATION: 96 % | WEIGHT: 168.5 LBS

## 2025-05-07 DIAGNOSIS — N18.31 CHRONIC KIDNEY DISEASE, STAGE 3A (H): Primary | ICD-10-CM

## 2025-05-07 DIAGNOSIS — Z00.00 ENCOUNTER FOR MEDICARE ANNUAL WELLNESS EXAM: ICD-10-CM

## 2025-05-07 DIAGNOSIS — Z78.0 MENOPAUSE: ICD-10-CM

## 2025-05-07 DIAGNOSIS — F43.23 ADJUSTMENT DISORDER WITH MIXED ANXIETY AND DEPRESSED MOOD: ICD-10-CM

## 2025-05-07 DIAGNOSIS — E78.00 HYPERCHOLESTEREMIA: ICD-10-CM

## 2025-05-07 DIAGNOSIS — R73.03 PRE-DIABETES: ICD-10-CM

## 2025-05-07 LAB
CHOLEST SERPL-MCNC: 148 MG/DL
EST. AVERAGE GLUCOSE BLD GHB EST-MCNC: 128 MG/DL
FASTING STATUS PATIENT QL REPORTED: ABNORMAL
HBA1C MFR BLD: 6.1 %
HDLC SERPL-MCNC: 46 MG/DL
LDLC SERPL CALC-MCNC: 43 MG/DL
NONHDLC SERPL-MCNC: 102 MG/DL
TRIGL SERPL-MCNC: 296 MG/DL

## 2025-05-07 PROCEDURE — 36415 COLL VENOUS BLD VENIPUNCTURE: CPT | Mod: ZL | Performed by: FAMILY MEDICINE

## 2025-05-07 PROCEDURE — 82465 ASSAY BLD/SERUM CHOLESTEROL: CPT | Mod: ZL | Performed by: FAMILY MEDICINE

## 2025-05-07 PROCEDURE — 83718 ASSAY OF LIPOPROTEIN: CPT | Mod: ZL | Performed by: FAMILY MEDICINE

## 2025-05-07 PROCEDURE — 83036 HEMOGLOBIN GLYCOSYLATED A1C: CPT | Mod: ZL | Performed by: FAMILY MEDICINE

## 2025-05-07 SDOH — HEALTH STABILITY: PHYSICAL HEALTH: ON AVERAGE, HOW MANY MINUTES DO YOU ENGAGE IN EXERCISE AT THIS LEVEL?: 60 MIN

## 2025-05-07 SDOH — HEALTH STABILITY: PHYSICAL HEALTH: ON AVERAGE, HOW MANY DAYS PER WEEK DO YOU ENGAGE IN MODERATE TO STRENUOUS EXERCISE (LIKE A BRISK WALK)?: 2 DAYS

## 2025-05-07 ASSESSMENT — SOCIAL DETERMINANTS OF HEALTH (SDOH): HOW OFTEN DO YOU GET TOGETHER WITH FRIENDS OR RELATIVES?: THREE TIMES A WEEK

## 2025-05-07 NOTE — PATIENT INSTRUCTIONS
Patient Education   Preventive Care Advice   This is general advice given by our system to help you stay healthy. However, your care team may have specific advice just for you. Please talk to your care team about your preventive care needs.  Nutrition  Eat 5 or more servings of fruits and vegetables each day.  Try wheat bread, brown rice and whole grain pasta (instead of white bread, rice, and pasta).  Get enough calcium and vitamin D. Check the label on foods and aim for 100% of the RDA (recommended daily allowance).  Lifestyle  Exercise at least 150 minutes each week  (30 minutes a day, 5 days a week).  Do muscle strengthening activities 2 days a week. These help control your weight and prevent disease.  No smoking.  Wear sunscreen to prevent skin cancer.  Have a dental exam and cleaning every 6 months.  Yearly exams  See your health care team every year to talk about:  Any changes in your health.  Any medicines your care team has prescribed.  Preventive care, family planning, and ways to prevent chronic diseases.  Shots (vaccines)   HPV shots (up to age 26), if you've never had them before.  Hepatitis B shots (up to age 59), if you've never had them before.  COVID-19 shot: Get this shot when it's due.  Flu shot: Get a flu shot every year.  Tetanus shot: Get a tetanus shot every 10 years.  Pneumococcal, hepatitis A, and RSV shots: Ask your care team if you need these based on your risk.  Shingles shot (for age 50 and up)  General health tests  Diabetes screening:  Starting at age 35, Get screened for diabetes at least every 3 years.  If you are younger than age 35, ask your care team if you should be screened for diabetes.  Cholesterol test: At age 39, start having a cholesterol test every 5 years, or more often if advised.  Bone density scan (DEXA): At age 50, ask your care team if you should have this scan for osteoporosis (brittle bones).  Hepatitis C: Get tested at least once in your life.  STIs (sexually  transmitted infections)  Before age 24: Ask your care team if you should be screened for STIs.  After age 24: Get screened for STIs if you're at risk. You are at risk for STIs (including HIV) if:  You are sexually active with more than one person.  You don't use condoms every time.  You or a partner was diagnosed with a sexually transmitted infection.  If you are at risk for HIV, ask about PrEP medicine to prevent HIV.  Get tested for HIV at least once in your life, whether you are at risk for HIV or not.  Cancer screening tests  Cervical cancer screening: If you have a cervix, begin getting regular cervical cancer screening tests starting at age 21.  Breast cancer scan (mammogram): If you've ever had breasts, begin having regular mammograms starting at age 40. This is a scan to check for breast cancer.  Colon cancer screening: It is important to start screening for colon cancer at age 45.  Have a colonoscopy test every 10 years (or more often if you're at risk) Or, ask your provider about stool tests like a FIT test every year or Cologuard test every 3 years.  To learn more about your testing options, visit:   .  For help making a decision, visit:   https://bit.ly/uj91906.  Prostate cancer screening test: If you have a prostate, ask your care team if a prostate cancer screening test (PSA) at age 55 is right for you.  Lung cancer screening: If you are a current or former smoker ages 50 to 80, ask your care team if ongoing lung cancer screenings are right for you.  For informational purposes only. Not to replace the advice of your health care provider. Copyright   2023 [facility]. All rights reserved. Clinically reviewed by the [facility]. UAV Navigation 429329 - REV 01/24.  Learning About Activities of Daily Living  What are activities of daily living?     Activities of daily living (ADLs) are the basic self-care tasks you do every day. These include eating, bathing, dressing,  and moving around.  As you age, and if you have health problems, you may find that it's harder to do some of these tasks. If so, your doctor can suggest ideas that may help.  To measure what kind of help you may need, your doctor will ask how well you are able to do ADLs. Let your doctor know if there are any tasks that you are having trouble doing. This is an important first step to getting help. And when you have the help you need, you can stay as independent as possible.  How will a doctor assess your ADLs?  Asking about ADLs is part of a routine health checkup your doctor will likely do as you age. Your health check might be done in a doctor's office, in your home, or at a hospital. The goal is to find out if you are having any problems that could make it hard to care for yourself or that make it unsafe for you to be on your own.  To measure your ADLs, your doctor will ask how hard it is for you to do routine tasks. Your doctor may also want to know if you have changed the way you do a task because of a health problem. Your doctor may watch how you:  Walk back and forth.  Keep your balance while you stand or walk.  Move from sitting to standing or from a bed to a chair.  Button or unbutton a shirt or sweater.  Remove and put on your shoes.  It's common to feel a little worried or anxious if you find you can't do all the things you used to be able to do. Talking with your doctor about ADLs is a way to make sure you're as safe as possible and able to care for yourself as well as you can. You may want to bring a caregiver, friend, or family member to your checkup. They can help you talk to your doctor.  Follow-up care is a key part of your treatment and safety. Be sure to make and go to all appointments, and call your doctor if you are having problems. It's also a good idea to know your test results and keep a list of the medicines you take.  Current as of: October 24, 2024  Content Version: 14.4    0911-2809  [employer].   Care instructions adapted under license by your healthcare professional. If you have questions about a medical condition or this instruction, always ask your healthcare professional. [employer] disclaims any warranty or liability for your use of this information.    Hearing Loss: Care Instructions  Overview     Hearing loss is a sudden or slow decrease in how well you hear. It can range from slight to profound. Permanent hearing loss can occur with aging. It also can happen when you are exposed long-term to loud noise. Examples include listening to loud music, riding motorcycles, or being around other loud machines.  Hearing loss can affect your work and home life. It can make you feel lonely or depressed. You may feel that you have lost your independence. But hearing aids and other devices can help you hear better and feel connected to others.  Follow-up care is a key part of your treatment and safety. Be sure to make and go to all appointments, and call your doctor if you are having problems. It's also a good idea to know your test results and keep a list of the medicines you take.  How can you care for yourself at home?  Avoid loud noises whenever possible. This helps keep your hearing from getting worse.  Always wear hearing protection around loud noises.  Wear a hearing aid as directed.  A professional can help you pick a hearing aid that will work best for you.  You can also get hearing aids over the counter for mild to moderate hearing loss.  Have hearing tests as your doctor suggests. They can show whether your hearing has changed. Your hearing aid may need to be adjusted.  Use other devices as needed. These may include:  Telephone amplifiers and hearing aids that can connect to a television, stereo, radio, or microphone.  Devices that use lights or vibrations. These alert you to the doorbell, a ringing telephone, or a baby monitor.  Television closed-captioning. This  "shows the words at the bottom of the screen. Most new TVs can do this.  TTY (text telephone). This lets you type messages back and forth on the telephone instead of talking or listening. These devices are also called TDD. When messages are typed on the keyboard, they are sent over the phone line to a receiving TTY. The message is shown on a monitor.  Use text messaging, social media, and email if it is hard for you to communicate by telephone.  Try to learn a listening technique called speechreading. It is not lipreading. You pay attention to people's gestures, expressions, posture, and tone of voice. These clues can help you understand what a person is saying. Face the person you are talking to, and have them face you. Make sure the lighting is good. You need to see the other person's face clearly.  Think about counseling if you need help to adjust to your hearing loss.  When should you call for help?  Watch closely for changes in your health, and be sure to contact your doctor if:    You think your hearing is getting worse.     You have new symptoms, such as dizziness or nausea.   Where can you learn more?  Go to https://www.[employer].net/patiented  Enter R798 in the search box to learn more about \"Hearing Loss: Care Instructions.\"  Current as of: October 27, 2024  Content Version: 14.4    1922-7069 [employer].   Care instructions adapted under license by your healthcare professional. If you have questions about a medical condition or this instruction, always ask your healthcare professional. [employer] disclaims any warranty or liability for your use of this information.    Learning About Sleeping Well  What does sleeping well mean?     Sleeping well means getting enough sleep to feel good and stay healthy. How much sleep is enough varies among people.  The number of hours you sleep and how you feel when you wake up are both important. If you do not feel refreshed, you probably need more " "sleep. Another sign of not getting enough sleep is feeling tired during the day.  Experts recommend that adults get at least 7 or more hours of sleep per day. Children and older adults need more sleep.  Why is getting enough sleep important?  Getting enough quality sleep is a basic part of good health. When your sleep suffers, your physical health, mood, and your thoughts can suffer too. You may find yourself feeling more grumpy or stressed. Not getting enough sleep also can lead to serious problems, including injury, accidents, anxiety, and depression.  What might cause poor sleeping?  Many things can cause sleep problems, including:  Changes to your sleep schedule.  Stress. Stress can be caused by fear about a single event, such as giving a speech. Or you may have ongoing stress, such as worry about work or school.  Depression, anxiety, and other mental or emotional conditions.  Changes in your sleep habits or surroundings. This includes changes that happen where you sleep, such as noise, light, or sleeping in a different bed. It also includes changes in your sleep pattern, such as having jet lag or working a late shift.  Health problems, such as pain, breathing problems, and restless legs syndrome.  Lack of regular exercise.  Using alcohol, nicotine, or caffeine before bed.  How can you help yourself?  Here are some tips that may help you sleep more soundly and wake up feeling more refreshed.  Your sleeping area   Use your bedroom only for sleeping and sex. A bit of light reading may help you fall asleep. But if it doesn't, do your reading elsewhere in the house. Try not to use your TV, computer, smartphone, or tablet while you are in bed.  Be sure your bed is big enough to stretch out comfortably, especially if you have a sleep partner.  Keep your bedroom quiet, dark, and cool. Use curtains, blinds, or a sleep mask to block out light. To block out noise, use earplugs, soothing music, or a \"white noise\" " "machine.  Your evening and bedtime routine   Create a relaxing bedtime routine. You might want to take a warm shower or bath, or listen to soothing music.  Go to bed at the same time every night. And get up at the same time every morning, even if you feel tired.  What to avoid   Limit caffeine (coffee, tea, caffeinated sodas) during the day, and don't have any for at least 6 hours before bedtime.  Avoid drinking alcohol before bedtime. Alcohol can cause you to wake up more often during the night.  Try not to smoke or use tobacco, especially in the evening. Nicotine can keep you awake.  Limit naps during the day, especially close to bedtime.  Avoid lying in bed awake for too long. If you can't fall asleep or if you wake up in the middle of the night and can't get back to sleep within about 20 minutes, get out of bed and go to another room until you feel sleepy.  Avoid taking medicine right before bed that may keep you awake or make you feel hyper or energized. Your doctor can tell you if your medicine may do this and if you can take it earlier in the day.  If you can't sleep   Imagine yourself in a peaceful, pleasant scene. Focus on the details and feelings of being in a place that is relaxing.  Get up and do a quiet or boring activity until you feel sleepy.  Avoid drinking any liquids before going to bed to help prevent waking up often to use the bathroom.  Where can you learn more?  Go to https://www.[employer].net/patiented  Enter J942 in the search box to learn more about \"Learning About Sleeping Well.\"  Current as of: July 31, 2024  Content Version: 14.4    0238-0010 [employer].   Care instructions adapted under license by your healthcare professional. If you have questions about a medical condition or this instruction, always ask your healthcare professional. [employer] disclaims any warranty or liability for your use of this information.    Bladder Training: Care Instructions  Your Care " Instructions     Bladder training is used to treat urge incontinence and stress incontinence. Urge incontinence means that the need to urinate comes on so fast that you can't get to a toilet in time. Stress incontinence means that you leak urine because of pressure on your bladder. For example, it may happen when you laugh, cough, or lift something heavy.  Bladder training can increase how long you can wait before you have to urinate. It can also help your bladder hold more urine. And it can give you better control over the urge to urinate.  It is important to remember that bladder training takes a few weeks to a few months to make a difference. You may not see results right away, but don't give up.  Follow-up care is a key part of your treatment and safety. Be sure to make and go to all appointments, and call your doctor if you are having problems. It's also a good idea to know your test results and keep a list of the medicines you take.  How can you care for yourself at home?  Work with your doctor to come up with a bladder training program that is right for you. You may use one or more of the following methods.  Delayed urination  In the beginning, try to keep from urinating for 5 minutes after you first feel the need to go.  While you wait, take deep, slow breaths to relax. Kegel exercises can also help you delay the need to go to the bathroom.  After some practice, when you can easily wait 5 minutes to urinate, try to wait 10 minutes before you urinate.  Slowly increase the waiting period until you are able to control when you have to urinate.  Scheduled urination  Empty your bladder when you first wake up in the morning.  Schedule times throughout the day when you will urinate.  Start by going to the bathroom every hour, even if you don't need to go.  Slowly increase the time between trips to the bathroom.  When you have found a schedule that works well for you, keep doing it.  If you wake up during the night  "and have to urinate, do it. Apply your schedule to waking hours only.  Kegel exercises  These tighten and strengthen pelvic muscles, which can help you control the flow of urine. (If doing these exercises causes pain, stop doing them and talk with your doctor.) To do Kegel exercises:  Squeeze your muscles as if you were trying not to pass gas. Or squeeze your muscles as if you were stopping the flow of urine. Your belly, legs, and buttocks shouldn't move.  Hold the squeeze for 3 seconds, then relax for 5 to 10 seconds.  Start with 3 seconds, then add 1 second each week until you are able to squeeze for 10 seconds.  Repeat the exercise 10 times a session. Do 3 to 8 sessions a day.  When should you call for help?  Watch closely for changes in your health, and be sure to contact your doctor if:    Your incontinence is getting worse.     You do not get better as expected.   Where can you learn more?  Go to https://www.[employer].net/patiented  Enter V684 in the search box to learn more about \"Bladder Training: Care Instructions.\"  Current as of: April 30, 2024  Content Version: 14.4    9286-2399 [employer].   Care instructions adapted under license by your healthcare professional. If you have questions about a medical condition or this instruction, always ask your healthcare professional. [employer] disclaims any warranty or liability for your use of this information.       "

## 2025-05-07 NOTE — PROGRESS NOTES
"Preventive Care Visit  [facility]  Vera Peterson MD, Family Medicine  May 7, 2025    Assessment & Plan     Chronic kidney disease, stage 3a (H)  ***    Adjustment disorder with mixed anxiety and depressed mood  ***    Pre-diabetes  ***    Hypercholesteremia  ***    Encounter for Medicare annual wellness exam  ***      Patient has been advised of split billing requirements and indicates understanding: Yes    BMI  Estimated body mass index is 29.85 kg/m  as calculated from the following:    Height as of this encounter: 1.6 m (5' 3\").    Weight as of this encounter: 76.4 kg (168 lb 8 oz).     Counseling  Appropriate preventive services were addressed with this patient via screening, questionnaire, or discussion as appropriate for fall prevention, nutrition, physical activity, Tobacco-use cessation, social engagement, weight loss and cognition.  Checklist reviewing preventive services available has been given to the patient.  Reviewed patient's diet, addressing concerns and/or questions.   She is at risk for lack of exercise and has been provided with information to increase physical activity for the benefit of her well-being.   Discussed possible causes of fatigue. Patient reported safety concerns were addressed today.The patient was provided with written information regarding signs of hearing loss.   Information on urinary incontinence and treatment options given to patient.       {Follow-up (Optional):794213}    Katya Zepeda is a 70 year old, presenting for the following:  Physical        5/7/2025     2:50 PM   Additional Questions   Roomed by cezar TALAMANTES    Advance Care Planning    Document on file is a Health Care Directive or POLST.        5/2/2025   General Health   How would you rate your overall physical health? Good   Feel stress (tense, anxious, or unable to sleep) Not at all         5/2/2025   Nutrition   Diet: Regular (no restrictions)         5/2/2025   Exercise   Days per " week of moderate/strenous exercise 2 days   Average minutes spent exercising at this level 60 min   (!) EXERCISE CONCERN      5/2/2025   Social Factors   Frequency of gathering with friends or relatives Twice a week   Worry food won't last until get money to buy more No   Food not last or not have enough money for food? No   Do you have housing? (Housing is defined as stable permanent housing and does not include staying outside in a car, in a tent, in an abandoned building, in an overnight shelter, or couch-surfing.) Yes   Are you worried about losing your housing? No   Lack of transportation? No   Unable to get utilities (heat,electricity)? No         5/7/2025   Fall Risk   Fallen 2 or more times in the past year? No    Trouble with walking or balance? No        Proxy-reported          5/2/2025   Activities of Daily Living- Home Safety   Needs help with the following daily activites None of the above   Safety concerns in the home Throw rugs in the hallway         5/2/2025   Dental   Dentist two times every year? Yes         5/2/2025   Hearing Screening   Hearing concerns? (!) IT'S HARD TO FOLLOW A CONVERSATION IN A NOISY RESTAURANT OR CROWDED ROOM.    (!) TROUBLE UNDERSTANDING SOFT OR WHISPERED SPEECH.       Multiple values from one day are sorted in reverse-chronological order         5/2/2025   Driving Risk Screening   Patient/family members have concerns about driving No         5/2/2025   General Alertness/Fatigue Screening   Have you been more tired than usual lately? (!) YES         5/2/2025   Urinary Incontinence Screening   Bothered by leaking urine in past 6 months Yes         Today's PHQ-2 Score:       5/7/2025     2:52 PM   PHQ-2 ( 1999 [employer])   Q1: Little interest or pleasure in doing things 0   Q2: Feeling down, depressed or hopeless 0   PHQ-2 Score 0    Q1: Little interest or pleasure in doing things Not at all   Q2: Feeling down, depressed or hopeless Not at all   PHQ-2 Score 0        Patient-reported         5/2/2025   Substance Use   Alcohol more than 3/day or more than 7/wk No   Do you have a current opioid prescription? No   How severe/bad is pain from 1 to 10? 1/10   Do you use any other substances recreationally? No     Social History     Tobacco Use    Smoking status: Never     Passive exposure: Never    Smokeless tobacco: Never   Vaping Use    Vaping status: Never Used   Substance Use Topics    Alcohol use: Yes     Comment: Occasionally never daily    Drug use: Never         3/19/2025   LAST FHS-7 RESULTS   1st degree relative breast or ovarian cancer No   Any relative bilateral breast cancer No   Any male have breast cancer No   Any ONE woman have BOTH breast AND ovarian cancer No   Any woman with breast cancer before 50yrs No   2 or more relatives with breast AND/OR ovarian cancer No   2 or more relatives with breast AND/OR bowel cancer No     Mammogram Screening - Mammogram every 1-2 years updated in Health Maintenance based on mutual decision making    ASCVD Risk   The 10-year ASCVD risk score (Jenni DUDLEY, et al., 2019) is: 7.7%    Values used to calculate the score:      Age: 70 years      Sex: Female      Is Non- : No      Diabetic: No      Tobacco smoker: No      Systolic Blood Pressure: 111 mmHg      Is BP treated: No      HDL Cholesterol: 52 mg/dL      Total Cholesterol: 157 mg/dL    Reviewed and updated as needed this visit by Provider   Tobacco  Allergies  Meds  Problems  Med Hx  Surg Hx  Fam Hx            Current providers sharing in care for this patient include:  Patient Care Team:  Vera Peterson MD as PCP - General (Family Medicine)  Robert Blakely MD as Assigned PCP    The following health maintenance items are reviewed in Epic and correct as of today:  Health Maintenance   Topic Date Due    DTAP/TDAP/TD IMMUNIZATION (6 - Td or Tdap) 07/21/2020    COVID-19 Vaccine (6 - 2024-25 season) 09/01/2024    LIPID  12/27/2024     "Pneumococcal Vaccine: 50+ Years (3 of 3 - PCV20 or PCV21) 10/26/2025    MAMMO SCREENING  03/19/2026    BMP  04/07/2026    MICROALBUMIN  04/07/2026    HEMOGLOBIN  04/11/2026    MEDICARE ANNUAL WELLNESS VISIT  05/07/2026    FALL RISK ASSESSMENT  05/07/2026    DIABETES SCREENING  04/07/2028    COLORECTAL CANCER SCREENING  11/15/2029    ADVANCE CARE PLANNING  05/07/2030    DEXA  10/30/2033    HEPATITIS C SCREENING  Completed    PHQ-2 (once per calendar year)  Completed    INFLUENZA VACCINE  Completed    URINALYSIS  Completed    ZOSTER IMMUNIZATION  Completed    RSV VACCINE  Completed    HPV IMMUNIZATION  Aged Out    MENINGITIS IMMUNIZATION  Aged Out     Review of Systems  Constitutional, neuro, ENT, endocrine, pulmonary, cardiac, gastrointestinal, genitourinary, musculoskeletal, integument and psychiatric systems are negative, except as otherwise noted.     Objective      Exam  /74 (BP Location: Left arm, Patient Position: Sitting, Cuff Size: Adult Regular)   Pulse 84   Temp 97.1  F (36.2  C) (Tympanic)   Resp 16   Ht 1.6 m (5' 3\")   Wt 76.4 kg (168 lb 8 oz)   SpO2 96%   BMI 29.85 kg/m     Estimated body mass index is 29.85 kg/m  as calculated from the following:    Height as of this encounter: 1.6 m (5' 3\").    Weight as of this encounter: 76.4 kg (168 lb 8 oz).    Physical Exam    GENERAL: alert and no distress  NECK: no adenopathy, no asymmetry, masses, or scars  RESP: lungs clear to auscultation - no rales, rhonchi or wheezes  CV: regular rates and rhythm, normal S1 S2, no S3 or S4, no murmur, click or rub, and no peripheral edema  ABDOMEN: soft, nontender, no hepatosplenomegaly, no masses and bowel sounds normal  MS: no gross musculoskeletal defects noted, no edema  SKIN: no suspicious lesions or rashes  PSYCH: mentation appears normal, affect normal/bright         5/7/2025   Mini Cog   Clock Draw Score 0 Abnormal   3 Item Recall 3 objects recalled   Mini Cog Total Score 3     Signed Electronically by: " Vera Peterson MD    The longitudinal plan of care for the diagnosis(es)/condition(s) as documented were addressed during this visit. Due to the added complexity in care, I will continue to support Katelyn in the subsequent management and with ongoing continuity of care.

## 2025-05-28 ENCOUNTER — HOSPITAL ENCOUNTER (OUTPATIENT)
Dept: BONE DENSITY | Facility: HOSPITAL | Age: 70
Discharge: HOME | End: 2025-05-28
Attending: FAMILY MEDICINE
Payer: COMMERCIAL

## 2025-05-28 DIAGNOSIS — Z78.0 MENOPAUSE: ICD-10-CM

## 2025-05-28 PROCEDURE — 77080 DXA BONE DENSITY AXIAL: CPT

## 2025-05-28 PROCEDURE — 77080 DXA BONE DENSITY AXIAL: CPT | Mod: 26 | Performed by: RADIOLOGY

## 2025-07-22 ENCOUNTER — OFFICE VISIT (OUTPATIENT)
Dept: CHIROPRACTIC MEDICINE | Facility: OTHER | Age: 70
End: 2025-07-22
Attending: CHIROPRACTOR
Payer: COMMERCIAL

## 2025-07-22 DIAGNOSIS — M54.42 ACUTE BILATERAL LOW BACK PAIN WITH BILATERAL SCIATICA: ICD-10-CM

## 2025-07-22 DIAGNOSIS — M99.02 SEGMENTAL AND SOMATIC DYSFUNCTION OF THORACIC REGION: ICD-10-CM

## 2025-07-22 DIAGNOSIS — M54.41 ACUTE BILATERAL LOW BACK PAIN WITH BILATERAL SCIATICA: ICD-10-CM

## 2025-07-22 DIAGNOSIS — M99.01 SEGMENTAL AND SOMATIC DYSFUNCTION OF CERVICAL REGION: ICD-10-CM

## 2025-07-22 DIAGNOSIS — M99.03 SEGMENTAL AND SOMATIC DYSFUNCTION OF LUMBAR REGION: Primary | ICD-10-CM

## 2025-07-22 PROCEDURE — 98941 CHIROPRACT MANJ 3-4 REGIONS: CPT | Mod: AT | Performed by: CHIROPRACTOR

## 2025-07-28 NOTE — PROGRESS NOTES
Subjective Finding:    Chief compalint: Patient presents with:  Back Pain , Pain Scale: 5/10, Intensity: sharp, Duration: 1 weeks, Radiating: bilateral buttock.    Date of injury:     Activities that the pain restricts:   Home/household/hobbies/social activities: Yes.  Work duties: Yes.  Sleep: No.  Makes symptoms better: rest.  Makes symptoms worse: lumbar extension and lumbar flexion.  Have you seen anyone else for the symptoms? No.  Work related: No.  Automobile related injury: No.    Objective and Assessment:    Posture Analysis:   High shoulder: .  Head tilt: .  High iliac crest: .  Head carriage: neutral.  Thoracic Kyphosis: neutral.  Lumbar Lordosis: forward.    Lumbar Range of Motion: flexion decreased.  Cervical Range of Motion: .  Thoracic Range of Motion: .  Extremity Range of Motion: .    Palpation:   Piriformis: right, referred pain: no    Segmental dysfunction pre-treatment and treatment area: C7, T2, T4, L4, and L5.    Assessment post-treatment:  Cervical: ROM increased.  Thoracic: ROM increased.  Lumbar: ROM increased.    Comments: .      Complicating Factors: .    Procedure(s):  CMT:  05450 Chiropractic manipulative treatment 3-4 regions performed   Cervical: Diversified, See above for level, Supine, Thoracic: Diversified, See above for level, Prone, and Lumbar: Diversified, See above for level, Side posture    Modalities:  None performed this visit    Therapeutic procedures:  None    Plan:  Treatment plan: PRN.  Instructed patient: stretch as instructed at visit.  Short term goals: reduce pain.  Long term goals: increase ADL.  Prognosis: very good.

## 2025-07-31 ENCOUNTER — OFFICE VISIT (OUTPATIENT)
Dept: CHIROPRACTIC MEDICINE | Facility: OTHER | Age: 70
End: 2025-07-31
Attending: CHIROPRACTOR
Payer: COMMERCIAL

## 2025-07-31 DIAGNOSIS — M54.50 ACUTE BILATERAL LOW BACK PAIN WITHOUT SCIATICA: ICD-10-CM

## 2025-07-31 DIAGNOSIS — M99.03 SEGMENTAL AND SOMATIC DYSFUNCTION OF LUMBAR REGION: Primary | ICD-10-CM

## 2025-07-31 DIAGNOSIS — M99.02 SEGMENTAL AND SOMATIC DYSFUNCTION OF THORACIC REGION: ICD-10-CM

## 2025-07-31 PROCEDURE — 98940 CHIROPRACT MANJ 1-2 REGIONS: CPT | Mod: AT | Performed by: CHIROPRACTOR

## (undated) DEVICE — HANDPIECE-CAPSULEGUARD I/A STELLARIS

## (undated) DEVICE — LENS IMPLANTATION SYSTEM-FOR ZCT300

## (undated) DEVICE — BIN-TECNIS DCB00 LENSES

## (undated) DEVICE — BETADINE 5% STERILE OPHTHALMIC SOLUTION 1 OZ.

## (undated) DEVICE — KNIFE-KERATOME SLIT 2.8MM

## (undated) DEVICE — CANNULA-NUCLEUS HYDRODISSECTOR

## (undated) DEVICE — GLV-7.5 PROTEXIS PI CLASSIC LF/PF

## (undated) DEVICE — INSTRUMENT WIPE-VISIWIPE

## (undated) DEVICE — BIN-LENS IMPLANT CART

## (undated) DEVICE — BIN-CATARACT BIN

## (undated) DEVICE — PACK-PHACO STELLARIS

## (undated) DEVICE — GLV-7.0 PROTEXIS PI CLASSIC LF/PF

## (undated) DEVICE — PACK-EYE-CUSTOM

## (undated) DEVICE — KNIFE-MICRO UNITOME 5.0MM

## (undated) DEVICE — CYSTOTOME-IRRIGATING  25G